# Patient Record
Sex: MALE | Race: BLACK OR AFRICAN AMERICAN | NOT HISPANIC OR LATINO | Employment: OTHER | URBAN - METROPOLITAN AREA
[De-identification: names, ages, dates, MRNs, and addresses within clinical notes are randomized per-mention and may not be internally consistent; named-entity substitution may affect disease eponyms.]

---

## 2017-05-09 ENCOUNTER — ALLSCRIPTS OFFICE VISIT (OUTPATIENT)
Dept: OTHER | Facility: OTHER | Age: 72
End: 2017-05-09

## 2017-05-12 ENCOUNTER — ALLSCRIPTS OFFICE VISIT (OUTPATIENT)
Dept: OTHER | Facility: OTHER | Age: 72
End: 2017-05-12

## 2017-11-01 DIAGNOSIS — I77.810 THORACIC AORTIC ECTASIA (HCC): ICD-10-CM

## 2017-11-02 ENCOUNTER — HOSPITAL ENCOUNTER (OUTPATIENT)
Dept: CT IMAGING | Facility: CLINIC | Age: 72
Discharge: HOME | End: 2017-11-02
Payer: MEDICARE

## 2017-11-02 DIAGNOSIS — I77.810 THORACIC AORTIC ECTASIA (HCC): ICD-10-CM

## 2017-11-02 PROCEDURE — 71250 CT THORAX DX C-: CPT

## 2017-11-13 ENCOUNTER — ALLSCRIPTS OFFICE VISIT (OUTPATIENT)
Dept: OTHER | Facility: OTHER | Age: 72
End: 2017-11-13

## 2017-11-14 NOTE — PROGRESS NOTES
Assessment  Assessed    1  Erectile dysfunction (607 84) (N52 9)   2  Anxiety (300 00) (F41 9)   3  Essential hypertension (401 9) (I10)   4  Dilated aortic root (447 71) (I04 810)    Plan  Erectile dysfunction    · Viagra 50 MG Oral Tablet; TAKE AS DIRECTED   Rx By: Evelyn Srivastava; Dispense: 0 Days ; #:6 Tablet; Refill: 4;Erectile dysfunction; GAVIN = N; Verified Transmission to [email or web address] [facility]; Last Updated By: [employer]; 11/13/2017 3:51:36 PM    Discussion/Summary  Cardiology Discussion Summary Free Text Note Form [facility]:   Patient with multiple medical problems who seems to be doing reasonably well from cardiac standpoint  Previous studies reviewed with patient  Medications reviewed and possible side effects discussed  concepts of cardiovascular disease , signs and symptoms of heart disease  Dietary and risk factor modification reinforced  All questions answered  Safety measures reviewed  Patient advised to report any problems prompting medical attention  Results of CT chest showing stable thoracic aortic dilatation at 4 2 cm discussed with patient  Importance of salt restriction as well as compliance with antihypertensive medications reviewed  Lengthy discussion with patient regarding erectile dysfunction  Patient would like to try some medications  Patient given prescription for Viagra 50 mg as directed  Risks and benefits as well as side effects discussed with patient  Patient report any side effects or issues taking medications as well as any improvement with his erectile dysfunction  Follow-up in 6 months  Follow-up with primary care physician  Patient had a few questions which were answered  Goals and Barriers: The patient has the current Goals: Compliance with medications  The patent has the current Barriers: None  Patient's Capacity to Self-Care: Patient is able to Self-Care  Patient Education: Educational resources provided: Please see discussion summary     Medication SE Review and Pt Understands Tx: Possible side effects of new medications were reviewed with the patient/guardian today  Counseling Documentation With Imm: The patient was counseled regarding risk factor reductions,-- impressions,-- risks and benefits of treatment options,-- importance of compliance with treatment  25 minutes was spent counseling  Chief Complaint  Chief Complaint Chronic Condition [facility]: Patient is here today for follow up of chronic conditions described in HPI  History of Present Illness  Cardiology [facility] Free Text Note Form [facility]: Patient presents for follow-up visit  Patient denies any history of chest pain shortness of breath  Patient denies any history of leg edema or orthopnea PND  No history of presyncope syncope  Patient states compliance with the present list of medications  Patient having issues with erectile dysfunction  He wants to try some medications for the same      Review of Systems  Cardiology Male ROS:    Cardiac: as noted in HPI  Skin: No complaints of nonhealing sores or skin rash  Genitourinary: erectile dysfunction, but-- as noted in HPI  Psychological: anxiety  General: No complaints of trouble sleeping, lack of energy, fatigue, appetite changes, weight changes, fever, frequent infections, or night sweats  Respiratory: No complaints of shortness of breath, cough with sputum, or wheezing  HEENT: No complaints of serious problems, hearing problems, nose problems, throat problems, or snoring  Gastrointestinal: No complaints of liver problems, nausea, vomiting, heartburn, constipation, bloody stools, diarrhea, problems swallowing, adbominal pain, or rectal bleeding  Hematologic: No complaints of bleeding disorders, anemia, blood clots, or excessive brusing  Neurological: No complaints of numbness, tingling, dizziness, weakness, seizures, headaches, syncope or fainting, AM fatigue, daytime sleepiness, no witnessed apnea episodes    Musculoskeletal: No complaints of arthritis, back pain, or painfull swelling  ROS Reviewed:   ROS reviewed  Active Problems  Problems    1  Anxiety (300 00) (F41 9)   2  Bradycardia (427 89) (R00 1)   3  Cerebral infarction, unspecified (434 91) (I63 9)   4  Diabetes mellitus with neuropathy (250 60,357 2) (E11 40)   5  Diabetic peripheral neuropathy (250 60,357 2) (E11 42)   6  Dilated aortic root (447 71) (I77 810)   7  Dizziness (780 4) (R42)   8  Dry mouth (527 7) (R68 2)   9  Dyspnea on exertion (786 09) (R06 09)   10  Essential hypertension (401 9) (I10)   11  Exercise-induced shortness of breath (786 05) (R06 02)    Past Medical History  Problems    1  History of diabetes mellitus (V12 29) (Z86 39)   2  History of hypertension (V12 59) (Z86 79)  Active Problems And Past Medical History Reviewed: The active problems and past medical history were reviewed and updated today  Surgical History  Problems    1  History of Appendectomy  Surgical History Reviewed: The surgical history was reviewed and updated today  Family History  Mother    1  Family history of Ovarian cancer  Sister    2  Family history of malignant neoplasm (V16 9) (Z80 9)  Family History Reviewed: The family history was reviewed and updated today  Social History  Problems    · Never a smoker   · Occasional alcohol use   · Retired   ·   Social History Reviewed: The social history was reviewed and updated today  Current Meds   1  Aspir-81 TBEC; TAKE 1 TABLET DAILY; Therapy: (Recorded:79Lli4171) to Recorded   2  Gabapentin 300 MG Oral Capsule; Take 1 capsule twice daily; Therapy: (Recorded:83Wif4415) to Recorded   3  GlipiZIDE ER 2 5 MG Oral Tablet Extended Release 24 Hour; TAKE 1 TABLET DAILY; Therapy: (Recorded:96Pxf4892) to Recorded   4  Januvia 100 MG Oral Tablet; TAKE 1 TABLET DAILY; Therapy: (Recorded:46Pye8270) to Recorded   5  Losartan Potassium 100 MG Oral Tablet; take 1 tablet by mouth once daily;  Therapy: 10MYM5970 to (Anival Galeana)  Requested for: 25RHL5399; Last Rx:21Ixa6851 Ordered   6  Metoprolol Succinate ER 50 MG Oral Tablet Extended Release 24 Hour; take 1 tablet twice a day; Therapy: 33HQL5927 to (Anival Galeana)  Requested for: 48WAE2290; Last Rx:51Hry9926 Ordered   7  Norvasc 10 MG Oral Tablet; TAKE 1 TABLET EVERY MORNING; Therapy: (Recorded:05Oct2016) to Recorded  Medication List Reviewed: The medication list was reviewed and updated today  Allergies  Medication    1  No Known Drug Allergies  Non-Medication    2  No Known Environmental Allergies   3  No Known Food Allergies    Vitals  Vital Signs    Recorded: 51XXL9442 03:38PM   Heart Rate 65   Systolic 702   Diastolic 82   Height 5 ft 11 in   Weight 220 lb 8 0 oz   BMI Calculated 30 75   BSA Calculated 2 2   O2 Saturation 100       Physical Exam   Constitutional  General appearance: No acute distress, well appearing and well nourished  Eyes  Conjunctiva and Sclera examination: Conjunctiva pink, sclera anicteric  Ears, Nose, Mouth, and Throat - Oropharynx: Clear, nares are clear, mucous membranes are moist   Neck  Neck and thyroid: Normal, supple, trachea midline, no thyromegaly  Pulmonary  Respiratory effort: No increased work of breathing or signs of respiratory distress  Auscultation of lungs: Clear to auscultation, no rales, no rhonchi, no wheezing, good air movement  Cardiovascular  Auscultation of heart: Normal rate and rhythm, normal S1 and S2, no murmurs  Carotid pulses: Normal, 2+ bilaterally  Peripheral vascular exam: Normal pulses throughout, no tenderness, erythema or swelling  Pedal pulses: Normal, 2+ bilaterally  Examination of extremities for edema and/or varicosities: Normal    Abdomen  Abdomen: Non-tender and no distention  Liver and spleen: No hepatomegaly or splenomegaly  Musculoskeletal Gait and station: Normal gait  -- Digits and nails: Normal without clubbing or cyanosis  -- Inspection/palpation of joints, bones, and muscles: Normal, ROM normal    Skin - Skin and subcutaneous tissue: Normal without rashes or lesions  Skin is warm and well perfused, normal turgor  Neurologic - Cranial nerves: II - XII intact  -- Speech: Normal    Psychiatric - Orientation to person, place, and time: Normal -- Mood and affect: Normal       Results/Data  Diagnostic Studies Reviewed Cardio: I personally reviewed the recording/images in the office today  My interpretation follows  CT Scan Review: CT of the chest shows stable thoracic aortic aneurysm measuring 4 2 cm        Future Appointments    Date/Time Provider Specialty Site   01/15/2018 04:20 PM Humaira Del Rio MD Neurology [facility] [address] [place] [address]       Signatures   Electronically signed by : WELLINGTON Walton ; Nov 13 2017  8:00PM EST                       (Author)

## 2018-01-13 VITALS
HEIGHT: 71 IN | SYSTOLIC BLOOD PRESSURE: 132 MMHG | DIASTOLIC BLOOD PRESSURE: 88 MMHG | WEIGHT: 224.06 LBS | BODY MASS INDEX: 31.37 KG/M2 | HEART RATE: 64 BPM

## 2018-01-14 VITALS
SYSTOLIC BLOOD PRESSURE: 126 MMHG | BODY MASS INDEX: 30.61 KG/M2 | HEART RATE: 58 BPM | OXYGEN SATURATION: 98 % | DIASTOLIC BLOOD PRESSURE: 86 MMHG | HEIGHT: 72 IN | WEIGHT: 226 LBS

## 2018-01-14 VITALS
SYSTOLIC BLOOD PRESSURE: 126 MMHG | HEIGHT: 71 IN | BODY MASS INDEX: 30.87 KG/M2 | DIASTOLIC BLOOD PRESSURE: 82 MMHG | WEIGHT: 220.5 LBS | HEART RATE: 65 BPM | OXYGEN SATURATION: 100 %

## 2018-01-15 NOTE — RESULT NOTES
Verified Results  * MRA HEAD WO CONTRAST [id number] 12:42PM Janeth Norman Order Number: [id number]     Test Name Result Flag Reference   MRA HEAD WO CONTRAST (Report)     MRA BRAIN     INDICATION: Headaches and dizziness  COMPARISON: Routine MRI examinations of the brain  No previous MRA exams  TECHNIQUE: Axial 3-D time-of-flight imaging with 3-D reconstructions  FINDINGS:     IMAGE QUALITY: Diagnostic  ANATOMY     INTERNAL CAROTID ARTERIES: Normal flow related enhancement of the distal cervical, petrous and cavernous segments of the internal carotid arteries  Normal ICA terminus  ANTERIOR CIRCULATION: Normal A1 segments  Normal anterior communicating artery  Normal flow-related enhancement of the anterior cerebral arteries  MIDDLE CEREBRAL ARTERY CIRCULATION: The M1 segment and middle cerebral artery branches demonstrate normal flow-related enhancement  DISTAL VERTEBRAL ARTERIES: Distal vertebral arteries are patient with a normal vertebrobasilar junction  The posterior inferior cerebellar artery origins are normal       BASILAR ARTERY: Normal      POSTERIOR CEREBRAL ARTERIES: Both posterior cerebral arteries arises from the basilar tip  Both arteries demonstrate normal flow-related enhancement  Normal posterior communicating arteries  IMPRESSION:     Normal MR angiogram of the brain         Workstation performed: [id number]     Signed by:   Michaela Solorzano DO   3/2/16

## 2018-01-24 ENCOUNTER — OFFICE VISIT (OUTPATIENT)
Dept: NEUROLOGY | Facility: CLINIC | Age: 73
End: 2018-01-24
Payer: MEDICARE

## 2018-01-24 VITALS
BODY MASS INDEX: 31.22 KG/M2 | SYSTOLIC BLOOD PRESSURE: 146 MMHG | DIASTOLIC BLOOD PRESSURE: 90 MMHG | WEIGHT: 223 LBS | HEIGHT: 71 IN | HEART RATE: 56 BPM

## 2018-01-24 DIAGNOSIS — E11.42 DIABETIC PERIPHERAL NEUROPATHY (HCC): ICD-10-CM

## 2018-01-24 DIAGNOSIS — I63.9 CEREBRAL INFARCTION, UNSPECIFIED MECHANISM (HCC): Primary | ICD-10-CM

## 2018-01-24 PROCEDURE — 99213 OFFICE O/P EST LOW 20 MIN: CPT | Performed by: PSYCHIATRY & NEUROLOGY

## 2018-01-24 RX ORDER — LOSARTAN POTASSIUM 50 MG/1
TABLET ORAL
COMMUNITY
End: 2019-01-14 | Stop reason: DRUGHIGH

## 2018-01-24 RX ORDER — ASPIRIN 81 MG/1
1 TABLET ORAL DAILY
COMMUNITY

## 2018-01-24 RX ORDER — GLIPIZIDE 2.5 MG/1
1 TABLET, EXTENDED RELEASE ORAL DAILY
COMMUNITY

## 2018-01-24 RX ORDER — AMLODIPINE BESYLATE 10 MG/1
TABLET ORAL EVERY MORNING
COMMUNITY

## 2018-01-24 RX ORDER — GABAPENTIN 300 MG/1
1 CAPSULE ORAL 2 TIMES DAILY
COMMUNITY
End: 2020-01-07

## 2018-01-24 RX ORDER — METOPROLOL SUCCINATE 50 MG/1
50 TABLET, EXTENDED RELEASE ORAL 2 TIMES DAILY
Refills: 0 | COMMUNITY
Start: 2017-11-06 | End: 2018-08-11 | Stop reason: SDUPTHER

## 2018-01-24 NOTE — PROGRESS NOTES
Progress Note - Neurology   Charles Meyers 81 y o  male MRN: [id number]  Unit/Bed#:  Encounter: [id number]      Subjective:   Patient without any new complaints, he denies any new stroke symptoms, his numbness and tingling in the feet is stable  No headaches, no dizziness, no other neurological complaints  ROS: 12 system cued query was unchanged from org  consult note  Vitals:   Vitals:    01/24/18 1238   BP: 146/90   Pulse: 56   ,Body mass index is 31 1 kg/m²  MEDS:      Physical Exam:  General appearance: alert, appears stated age and cooperative  Head: Normocephalic, without obvious abnormality, atraumatic    Neurologic:  Mental status: the patient is awake alert and oriented without aphasia or apraxia  CN: Visual fields are full to confrontation  Extraocular movements are full without nystagmus  Pupils are 3 mm and reactive  Face is symmetrical to light touch  Movements of facial expression move symmetrically  Hearing is normal to finger rub bilaterally  Soft palate lifts symmetrically  Shoulder shrug is symmetrical  Tongue is midline without atrophy  Motor: No drift is noted on arm extension  Strength is full in the upper and lower extremities with normal bulk and tone  Decreased light touch pinprick temperature sensation in a stocking distribution  Coordination: Finger to nose testing is performed accurately  Reflexes: 2/4 and symmetrical in the biceps, triceps, brachial radialis, knee jerk and ankle jerk regions  Toes are downgoing  Lab Results: I have personally reviewed pertinent reports  Imaging Studies: I have personally reviewed pertinent reports  Assessment:  1  Cerebral infarction, unspecified mechanism ([facility] [address] )     2   Di[facility]         Plan:  Patient was advised to keep his blood pressure cholesterol and sugar under control, continue with his aspirin and gabapentin, to take fall and safety precautions, stroke education given to the patient, to go to the hospital if has any worsening symptoms and call me otherwise to see me back in 6 months and follow up with family physician  Counseling / Coordination of Care  Total time spent today 20 minutes  Greater than 50% of total time was spent with the patient and / or family counseling and / or coordination of care  Missy Mills MD  1/24/2018,12:51 PM    Dictation voice to text software has been used in the creation of this document

## 2018-01-24 NOTE — PROGRESS NOTES
Patient ID: Charles Meyers is a 81 y o  male  Who presents today for a history of stroke and neuropathy    Assessment/Plan:    No problem-specific Assessment & Plan notes found for this encounter  Subjective:    HPI       The following portions of the patient's history were reviewed and updated as appropriate: allergies, current medications, past family history, past social history and past surgical history  Objective: There were no vitals taken for this visit  Physical Exam    Neurological Exam      ROS:    Review of Systems   Constitutional: Negative for appetite change and fever  HENT: Negative for nosebleeds  Eyes: Positive for pain  Negative for visual disturbance  Respiratory: Negative for chest tightness and shortness of breath  Cardiovascular: Negative for palpitations and leg swelling  Gastrointestinal: Negative for abdominal pain, constipation and diarrhea  Musculoskeletal: Positive for arthralgias and back pain  Negative for neck pain  Neurological: Positive for light-headedness and headaches  Negative for tremors, seizures, speech difficulty, weakness and numbness  Psychiatric/Behavioral: Positive for sleep disturbance

## 2018-05-21 DIAGNOSIS — I10 HYPERTENSION, ESSENTIAL: Primary | ICD-10-CM

## 2018-05-21 RX ORDER — LOSARTAN POTASSIUM 100 MG/1
TABLET ORAL
Qty: 90 TABLET | Refills: 3 | Status: SHIPPED | OUTPATIENT
Start: 2018-05-21 | End: 2018-06-11 | Stop reason: ALTCHOICE

## 2018-06-11 ENCOUNTER — OFFICE VISIT (OUTPATIENT)
Dept: CARDIOLOGY CLINIC | Facility: CLINIC | Age: 73
End: 2018-06-11
Payer: MEDICARE

## 2018-06-11 VITALS
HEIGHT: 71 IN | OXYGEN SATURATION: 98 % | BODY MASS INDEX: 30.1 KG/M2 | WEIGHT: 215 LBS | HEART RATE: 58 BPM | SYSTOLIC BLOOD PRESSURE: 132 MMHG | DIASTOLIC BLOOD PRESSURE: 74 MMHG

## 2018-06-11 DIAGNOSIS — I71.2 THORACIC AORTIC ANEURYSM WITHOUT RUPTURE (HCC): ICD-10-CM

## 2018-06-11 DIAGNOSIS — I10 HYPERTENSION, ESSENTIAL: Primary | ICD-10-CM

## 2018-06-11 PROBLEM — I71.20 THORACIC AORTIC ANEURYSM WITHOUT RUPTURE: Status: ACTIVE | Noted: 2018-06-11

## 2018-06-11 PROCEDURE — 99213 OFFICE O/P EST LOW 20 MIN: CPT | Performed by: INTERNAL MEDICINE

## 2018-08-02 ENCOUNTER — OFFICE VISIT (OUTPATIENT)
Dept: NEUROLOGY | Facility: CLINIC | Age: 73
End: 2018-08-02
Payer: MEDICARE

## 2018-08-02 VITALS
HEART RATE: 60 BPM | HEIGHT: 71 IN | DIASTOLIC BLOOD PRESSURE: 78 MMHG | WEIGHT: 217 LBS | SYSTOLIC BLOOD PRESSURE: 124 MMHG | BODY MASS INDEX: 30.38 KG/M2

## 2018-08-02 DIAGNOSIS — I63.9 CEREBRAL INFARCTION, UNSPECIFIED MECHANISM (HCC): Primary | ICD-10-CM

## 2018-08-02 DIAGNOSIS — E11.42 DIABETIC PERIPHERAL NEUROPATHY (HCC): ICD-10-CM

## 2018-08-02 PROCEDURE — 99214 OFFICE O/P EST MOD 30 MIN: CPT | Performed by: PSYCHIATRY & NEUROLOGY

## 2018-08-02 RX ORDER — UBIDECARENONE 75 MG
1 CAPSULE ORAL EVERY MORNING
Refills: 0 | COMMUNITY
Start: 2018-07-13 | End: 2020-05-27

## 2018-08-02 RX ORDER — CEPHRADINE 500 MG
1 CAPSULE ORAL EVERY MORNING
COMMUNITY
Start: 2018-07-10 | End: 2019-01-14 | Stop reason: HOSPADM

## 2018-08-02 NOTE — PROGRESS NOTES
Anabel Ram is a 81 y o  male  Chief Complaint   Patient presents with    Peripheral Neuropathy       Assessment:  1  Cerebral infarction, unspecified mechanism ([facility] [address] )    2  [facility]        Plan:    Discussion:  Patient is doing well with his history of CVA and diabetic neuropathy, he was advised to keep his blood pressure cholesterol and sugar is under control, stroke education given to the patient, he was advised to continue with his aspirin and Neurontin, have his blood work monitored by the family physician, he was also advised to follow up with his podiatrist regarding his foot care and with his cardiologist regarding his hypertension, to go to the hospital if has any worsening symptoms and call me otherwise to see me back in 6 months and follow up with his other physicians  Subjective:    HPI   Patient is here in follow-up for his history of CVA and diabetic neuropathy, since his last visit he is doing good, he has not had any stroke-like symptoms, his diabetic neuropathy is under control, no headaches no dizziness, no focal weakness, no other complaints      Vitals:    08/02/18 0816   BP: 124/78   BP Location: Left arm   Patient Position: Sitting   Cuff Size: Large   Pulse: 60   Weight: 98 4 kg (217 lb)   Height: 5' 11" (1 803 m)       Current Medications    Current Outpatient Prescriptions:     Alpha-Lipoic Acid 200 MG CAPS, Take 1 tablet by mouth every morning, Disp: , Rfl:     amLODIPine (NORVASC) 10 mg tablet, every morning  , Disp: , Rfl:     aspirin (ASPIR-LOW) 81 mg EC tablet, Take 1 tablet by mouth daily, Disp: , Rfl:     cyanocobalamin (VITAMIN B-12) 100 mcg tablet, Take 1 tablet by mouth every morning, Disp: , Rfl: 0    gabapentin (NEURONTIN) 300 mg capsule, Take 1 capsule by mouth 2 (two) times a day, Disp: , Rfl:     glipiZIDE (GLUCOTROL XL) 2 5 mg 24 hr tablet, Take 1 tablet by mouth daily, Disp: , Rfl:     losartan (COZAAR) 50 mg tablet, daily every morning, Disp: , Rfl:     metoprolol succinate (TOPROL-XL) 50 mg 24 hr tablet, Take 50 mg by mouth 2 (two) times a day, Disp: , Rfl: 0    sitaGLIPtin (JANUVIA) 100 mg tablet, Take 1 tablet by mouth daily, Disp: , Rfl:       Allergies  Patient has no known allergies  Past Medical History  Past Medical History:   Diagnosis Date    Anxiety     Bradycardia     Cerebral infarction ([facility] )     Diabetes mellitus ([facility] )     Diabetic neuropathy (HCC)     Dilated aortic root (HCC)     Essential hypertension     Exercise-induced shortness of breath          Past Surgical History:  Past Surgical History:   Procedure Laterality Date    APPENDECTOMY           Family History:  Family History   Problem Relation Age of Onset    Ovarian cancer Mother     Cancer Sister        Social History:   reports that he has never smoked  He has never used smokeless tobacco  He reports that he drinks alcohol  He reports that he does not use drugs  I have reviewed the past medical history, surgical history, social and family history, current medications, allergies vitals, review of systems, and updated this information as appropriate today  Objective:    Physical Exam    Neurological Exam    GENERAL:  Cooperative in no acute distress  Well-developed and well-nourished    HEAD and NECK   Head is atraumatic normocephalic with no lesions or masses  Neck is supple with full range of motion    CARDIOVASCULAR  Carotid Arteries-no carotid bruits  NEUROLOGIC:  Mental Status-the patient is awake alert and oriented without aphasia or apraxia  Cranial Nerves: Visual fields are full to confrontation  Extraocular movements are full without nystagmus  Pupils are 2-1/2 mm and reactive  Face is symmetrical to light touch  Movements of facial expression move symmetrically  Hearing is normal to finger rub bilaterally  Soft palate lifts symmetrically  Shoulder shrug is symmetrical  Tongue is midline without atrophy    Motor: No drift is noted on arm extension  Strength is full in the upper and lower extremities with normal bulk and tone  Sensory:  Decreased light touch pinprick temperature sensation in a glove and stocking distribution  Cortical function is intact  Coordination: Finger to nose testing is performed accurately  Gait reveals a normal base with symmetrical arm swing  Reflexes:  2+ and symmetrical          ROS:  Review of Systems   Constitutional: Negative  Negative for appetite change and fever  HENT: Negative  Negative for hearing loss, tinnitus, trouble swallowing and voice change  Eyes: Negative  Negative for photophobia and pain  Respiratory: Negative  Negative for shortness of breath  Cardiovascular: Negative  Negative for palpitations  Gastrointestinal: Negative  Negative for nausea and vomiting  Endocrine: Negative  Negative for cold intolerance and heat intolerance  Genitourinary: Negative  Negative for dysuria, frequency and urgency  Musculoskeletal: Positive for back pain  Negative for myalgias and neck pain  Skin: Negative  Negative for rash  Neurological: Negative for dizziness, tremors, seizures, syncope, facial asymmetry, speech difficulty, weakness, light-headedness, numbness and headaches  Hematological: Negative  Does not bruise/bleed easily  Psychiatric/Behavioral: Negative  Negative for confusion, hallucinations and sleep disturbance

## 2018-08-11 DIAGNOSIS — I10 HYPERTENSION, ESSENTIAL: Primary | ICD-10-CM

## 2018-08-11 RX ORDER — METOPROLOL SUCCINATE 50 MG/1
TABLET, EXTENDED RELEASE ORAL
Qty: 60 TABLET | Refills: 5 | Status: SHIPPED | OUTPATIENT
Start: 2018-08-11 | End: 2019-08-26 | Stop reason: SDUPTHER

## 2019-01-14 ENCOUNTER — OFFICE VISIT (OUTPATIENT)
Dept: NEUROLOGY | Facility: CLINIC | Age: 74
End: 2019-01-14
Payer: MEDICARE

## 2019-01-14 VITALS
WEIGHT: 216.6 LBS | DIASTOLIC BLOOD PRESSURE: 82 MMHG | HEIGHT: 71 IN | SYSTOLIC BLOOD PRESSURE: 132 MMHG | BODY MASS INDEX: 30.32 KG/M2 | HEART RATE: 68 BPM

## 2019-01-14 DIAGNOSIS — E11.42 DIABETIC PERIPHERAL NEUROPATHY (HCC): Primary | ICD-10-CM

## 2019-01-14 DIAGNOSIS — Z86.73 HISTORY OF CVA (CEREBROVASCULAR ACCIDENT): ICD-10-CM

## 2019-01-14 PROCEDURE — 99213 OFFICE O/P EST LOW 20 MIN: CPT | Performed by: PSYCHIATRY & NEUROLOGY

## 2019-01-14 RX ORDER — BLOOD SUGAR DIAGNOSTIC
STRIP MISCELLANEOUS
Refills: 0 | COMMUNITY
Start: 2018-11-02

## 2019-01-14 RX ORDER — SILDENAFIL 50 MG/1
50 TABLET, FILM COATED ORAL DAILY PRN
COMMUNITY
Start: 2018-11-13 | End: 2022-06-20

## 2019-01-14 RX ORDER — LANCETS 33 GAUGE
EACH MISCELLANEOUS
Refills: 0 | COMMUNITY
Start: 2018-11-02

## 2019-01-14 RX ORDER — LOSARTAN POTASSIUM 100 MG/1
1 TABLET ORAL DAILY
Refills: 0 | COMMUNITY
Start: 2018-11-12 | End: 2019-09-23 | Stop reason: SDUPTHER

## 2019-01-14 NOTE — PROGRESS NOTES
Christin Ramos is a 72 y o  male  Chief Complaint   Patient presents with    Follow-up     CVA and diabetic neuropathy        Assessment:  1  Diabetic peripheral neuropathy ([address] )    2  History of CVA (cerebrovascular accident)        Plan:    Discussion:  Patient is doing well with history of CVA and diabetic neuropathy, he is on aspirin and Neurontin, he was advised to continue with same, he was advised to keep his blood pressure cholesterol and sugar under control, stroke education given to the patient, the also was advised to follow-up with his podiatrist regarding his diabetic neuropathy foot care and with his cardiologist and family physician, to go to the hospital if has any stroke-like symptoms otherwise to see me back in 6 months and follow up with his other physicians  Subjective:    HPI   Patient is here in follow-up for his history of CVA and diabetic neuropathy, since his last visit he is doing good, he has not had any stroke-like symptoms, his diabetic neuropathy is under control, denies any numbness or tingling, no weakness, no side effects to Neurontin, no headaches no dizziness, no motor or sensory symptoms in upper or lower extremity, no other neurological complaints      Vitals:    01/14/19 1113   BP: 132/82   BP Location: Left arm   Patient Position: Sitting   Cuff Size: Adult   Pulse: 68   Weight: 98 2 kg (216 lb 9 6 oz)   Height: 5' 11" (1 803 m)       Current Medications    Current Outpatient Prescriptions:     amLODIPine (NORVASC) 10 mg tablet, every morning  , Disp: , Rfl:     aspirin (ASPIR-LOW) 81 mg EC tablet, Take 1 tablet by mouth daily, Disp: , Rfl:     cyanocobalamin (VITAMIN B-12) 100 mcg tablet, Take 1 tablet by mouth every morning, Disp: , Rfl: 0    gabapentin (NEURONTIN) 300 mg capsule, Take 1 capsule by mouth 2 (two) times a day, Disp: , Rfl:     glipiZIDE (GLUCOTROL XL) 2 5 mg 24 hr tablet, Take 1 tablet by mouth daily, Disp: , Rfl:     glucose blood (ONETOUCH VERIO) test strip, TEST twice a day, Disp: , Rfl:     losartan (COZAAR) 100 MG tablet, Take 1 tablet by mouth daily, Disp: , Rfl: 0    metoprolol succinate (TOPROL-XL) 50 mg 24 hr tablet, take 1 tablet by mouth twice a day, Disp: 60 tablet, Rfl: 5    ONETOUCH DELICA LANCETS 84E MISC, use ONE LANCET twice a day, Disp: , Rfl: 0    ONETOUCH VERIO test strip, TEST twice a day, Disp: , Rfl: 0    sildenafil (VIAGRA) 50 MG tablet, Take 50 mg by mouth daily as needed, Disp: , Rfl:     sitaGLIPtin (JANUVIA) 100 mg tablet, Take 1 tablet by mouth daily, Disp: , Rfl:       Allergies  Patient has no known allergies  Past Medical History  Past Medical History:   Diagnosis Date    Anxiety     Bradycardia     [facility]     Diabetes mellitus [facility]     Diabetic neuropathy (HCC)     Dilated aortic root (HCC)     Essential hypertension     Exercise-induced shortness of breath          Past Surgical History:  Past Surgical History:   Procedure Laterality Date    APPENDECTOMY           Family History:  Family History   Problem Relation Age of Onset    Ovarian cancer Mother     Cancer Sister        Social History:   reports that he has quit smoking  He has never used smokeless tobacco  He reports that he drinks alcohol  He reports that he does not use drugs  I have reviewed the past medical history, surgical history, social and family history, current medications, allergies vitals, review of systems, and updated this information as appropriate today  Objective:    Physical Exam    Neurological Exam      GENERAL:  Cooperative in no acute distress  Well-developed and well-nourished    HEAD and NECK   Head is atraumatic normocephalic with no lesions or masses  Neck is supple with full range of motion    CARDIOVASCULAR  Carotid Arteries-no carotid bruits      NEUROLOGIC:  Mental Status-the patient is awake alert and oriented without aphasia or apraxia  Cranial Nerves: Visual fields are full to confrontation  Extraocular movements are full without nystagmus  Pupils are 2-1/2 mm and reactive  Face is symmetrical to light touch  Movements of facial expression move symmetrically  Hearing is normal to finger rub bilaterally  Soft palate lifts symmetrically  Shoulder shrug is symmetrical  Tongue is midline without atrophy  Motor: No drift is noted on arm extension  Strength is full in the upper and lower extremities with normal bulk and tone  Sensory:   Decreased light touch pinprick temperature sensation in a glove and stocking distribution, cortical function is intact  Coordination: Finger to nose testing is performed accurately  Romberg is negative  Gait reveals a normal base with symmetrical arm swing  Reflexes:         1+ and symmetrical        ROS:  Review of Systems   Constitutional: Negative  HENT: Negative  Eyes: Positive for visual disturbance (feels stiff/dryness in both eyes intermittent)  Respiratory: Negative  Cardiovascular: Negative  Gastrointestinal: Negative  Endocrine: Negative  Genitourinary: Negative  Musculoskeletal: Positive for back pain (bulging disc low back pain)  Skin: Negative  Allergic/Immunologic: Negative  Neurological:        Off balance    Hematological: Negative  Psychiatric/Behavioral: Positive for sleep disturbance (falling and staying asleep )

## 2019-02-19 ENCOUNTER — OFFICE VISIT (OUTPATIENT)
Dept: CARDIOLOGY CLINIC | Facility: CLINIC | Age: 74
End: 2019-02-19
Payer: MEDICARE

## 2019-02-19 VITALS
HEART RATE: 65 BPM | WEIGHT: 221 LBS | BODY MASS INDEX: 30.94 KG/M2 | DIASTOLIC BLOOD PRESSURE: 72 MMHG | SYSTOLIC BLOOD PRESSURE: 118 MMHG | HEIGHT: 71 IN | OXYGEN SATURATION: 96 %

## 2019-02-19 DIAGNOSIS — I71.2 THORACIC AORTIC ANEURYSM WITHOUT RUPTURE (HCC): ICD-10-CM

## 2019-02-19 DIAGNOSIS — I10 HYPERTENSION, ESSENTIAL: Primary | ICD-10-CM

## 2019-02-19 PROCEDURE — 99213 OFFICE O/P EST LOW 20 MIN: CPT | Performed by: INTERNAL MEDICINE

## 2019-02-19 NOTE — PROGRESS NOTES
[facility]  [address] [place] [address]  Cardiology Follow Up    Sacha Beckwith  [birth year]  [id number]      1  Hypertension, essential     2  Thoracic aortic aneurysm without rupture [facility])         Chief Complaint   Patient presents with    Follow-up       Interval History:  Patient presents for follow-up visit  Patient denies any history of chest pain shortness of breath  Patient denies any history of leg edema or orthopnea PND  No history of presyncope syncope  Patient states compliance with the present list of medications  Patient Active Problem List   Diagnosis    Hypertension, essential    [facility]acic [facility] aneurysm without rupture [facility] [address] )    Cerebral infarction [facility] [facility] )    Diabetic peripheral neuropathy ([facility] )    History of CVA (cerebrovascular accident)     Past Medical History:   Diagnosis Date    Anxiety     Bradycardia     Cerebral infarction [facility] [facility] )     Diabetes mellitus ([facility] )     Diabetic neuropathy ([facility] )     Dilated aortic root ([facility] [facility] )     Essential hypertension     Exercise-induced shortness of breath      Social History     Socioeconomic History    Marital status:       Spouse name: Not on file    Number of children: Not on file    Years of education: Not on file    Highest education level: Not on file   Occupational History    Occupation: Retired   Social Needs    Financial resource strain: Not on file    Food insecurity:     Worry: Not on file     Inability: Not on file   SchoolOut needs:     Medical: Not on file     Non-medical: Not on file   Tobacco Use    Smoking status: Former Smoker    Smokeless tobacco: Never Used   Substance and Sexual Activity    Alcohol use: Yes     Comment: occasionally    Drug use: No    Sexual activity: Not on file   Lifestyle    Physical activity:     Days per week: Not on file     Minutes per session: Not on file    Stress: Not on file   Relationships    Social connections: Talks on phone: Not on file     Gets together: Not on file     Attends [employer] service: Not on file     Active member of club or organization: Not on file     Attends meetings of clubs or organizations: Not on file     Relationship status: Not on file    Intimate partner violence:     Fear of current or ex partner: Not on file     Emotionally abused: Not on file     Physically abused: Not on file     Forced sexual activity: Not on file   Other Topics Concern    Not on file   Social History Narrative    Not on file      Family History   Problem Relation Age of Onset    Ovarian cancer Mother     Cancer Sister      Past Surgical History:   Procedure Laterality Date    APPENDECTOMY         Current Outpatient Medications:     amLODIPine (NORVASC) 10 mg tablet, every morning  , Disp: , Rfl:     aspirin (ASPIR-LOW) 81 mg EC tablet, Take 1 tablet by mouth daily, Disp: , Rfl:     cyanocobalamin (VITAMIN B-12) 100 mcg tablet, Take 1 tablet by mouth every morning, Disp: , Rfl: 0    gabapentin (NEURONTIN) 300 mg capsule, Take 1 capsule by mouth 2 (two) times a day, Disp: , Rfl:     glipiZIDE (GLUCOTROL XL) 2 5 mg 24 hr tablet, Take 1 tablet by mouth daily, Disp: , Rfl:     glucose blood (ONETOUCH VERIO) test strip, TEST twice a day, Disp: , Rfl:     losartan (COZAAR) 100 MG tablet, Take 1 tablet by mouth daily, Disp: , Rfl: 0    metoprolol succinate (TOPROL-XL) 50 mg 24 hr tablet, take 1 tablet by mouth twice a day, Disp: 60 tablet, Rfl: 5    ONETOUCH DELICA LANCETS 07R MISC, use ONE LANCET twice a day, Disp: , Rfl: 0    ONETOUCH VERIO test strip, TEST twice a day, Disp: , Rfl: 0    sildenafil (VIAGRA) 50 MG tablet, Take 50 mg by mouth daily as needed, Disp: , Rfl:     sitaGLIPtin (JANUVIA) 100 mg tablet, Take 1 tablet by mouth daily, Disp: , Rfl:   No Known Allergies    Labs:  No visits with results within 2 Month(s) from this visit     Latest known visit with results is:   No results found for any previous visit      Imaging: No results found  Review of Systems:  Review of Systems   REVIEW OF SYSTEMS:  Constitutional:  Denies fever or chills   Eyes:  Denies change in visual acuity   HENT:  Denies nasal congestion or sore throat   Respiratory:  Denies cough or shortness of breath   Cardiovascular:  Denies chest pain or edema   GI:  Denies abdominal pain, nausea, vomiting, bloody stools or diarrhea   :  Denies dysuria, frequency, difficulty in micturition and nocturia  Musculoskeletal:  Denies back pain or joint pain   Neurologic:  Denies headache, focal weakness or sensory changes   Endocrine:  Denies polyuria or polydipsia   Lymphatic:  Denies swollen glands   Psychiatric:  Denies depression or anxiety     Physical Exam:    /72   Pulse 65   Ht 5' 11" (1 803 m)   Wt 100 kg (221 lb)   SpO2 96%   BMI 30 82 kg/m²     Physical Exam   PHYSICAL EXAM:  General:  Patient is not in acute distress   Head: Normocephalic, Atraumatic  HEENT:  Both pupils normal-size atraumatic, normocephalic, nonicteric  Neck:  JVP not raised  Trachea central  No carotid bruit  Respiratory:  normal breath sounds no crackles  no rhonchi  Cardiovascular:  Regular rate and rhythm no S3 no murmurs  GI:  Abdomen soft nontender  No organomegaly  Lymphatic:  No cervical or inguinal lymphadenopathy  Neurologic:  Patient is awake alert, oriented   Grossly nonfocal    Discussion/Summary:  Patient overall doing well from a cardiovascular standpoint  Continue present medications  Symptoms to watch out from cardiac standpoint which would indicate the need for further cardiac evaluation discussed  Patient will have a CT of the chest as well as echocardiogram prior to next visit to reassess history of thoracic aortic aneurysm  Follow-up with primary care physician  Followup in 6 months

## 2019-07-18 ENCOUNTER — HOSPITAL ENCOUNTER (OUTPATIENT)
Dept: NON INVASIVE DIAGNOSTICS | Facility: HOSPITAL | Age: 74
Discharge: HOME | End: 2019-07-18
Attending: INTERNAL MEDICINE
Payer: MEDICARE

## 2019-07-18 ENCOUNTER — HOSPITAL ENCOUNTER (OUTPATIENT)
Dept: CT IMAGING | Facility: HOSPITAL | Age: 74
Discharge: HOME | End: 2019-07-18
Attending: INTERNAL MEDICINE
Payer: MEDICARE

## 2019-07-18 ENCOUNTER — TELEPHONE (OUTPATIENT)
Dept: CARDIOLOGY CLINIC | Facility: CLINIC | Age: 74
End: 2019-07-18

## 2019-07-18 DIAGNOSIS — I10 HYPERTENSION, ESSENTIAL: ICD-10-CM

## 2019-07-18 DIAGNOSIS — I71.2 THORACIC AORTIC ANEURYSM WITHOUT RUPTURE (HCC): ICD-10-CM

## 2019-07-18 PROCEDURE — 93306 TTE W/DOPPLER COMPLETE: CPT

## 2019-07-18 PROCEDURE — 93306 TTE W/DOPPLER COMPLETE: CPT | Performed by: INTERNAL MEDICINE

## 2019-07-18 PROCEDURE — 71250 CT THORAX DX C-: CPT

## 2019-07-18 NOTE — TELEPHONE ENCOUNTER
----- Message from Desiree Ferrari MD sent at 7/18/2019  2:05 PM EDT -----  Please call  Echocardiogram shows normal ejection fraction  Moderate mitral regurgitation  Mild-to-moderate aortic regurgitation  Mild dilatation of the ascending aorta which was there before  Continue present medications  Will  Need repeat echocardiogram in 1 year

## 2019-07-18 NOTE — TELEPHONE ENCOUNTER
LMOM per Dr Andreina Storm Echocardiogram shows normal ejection fraction  Moderate mitral regurgitation and mild-to-moderate aortic regurgitation  Mild dilatation of the ascending aorta- which was in previous studies  He is to continue present medications  And repeat echo in 1 year

## 2019-07-23 ENCOUNTER — TELEPHONE (OUTPATIENT)
Dept: CARDIOLOGY CLINIC | Facility: CLINIC | Age: 74
End: 2019-07-23

## 2019-07-23 NOTE — TELEPHONE ENCOUNTER
----- Message from Aris Vyas MD sent at 7/22/2019  2:30 PM EDT -----  Stable 4 2 cm thoracic aortic aneurysm  No change from previous CT chest  in 2017

## 2019-07-24 ENCOUNTER — TELEPHONE (OUTPATIENT)
Dept: CARDIOLOGY CLINIC | Facility: CLINIC | Age: 74
End: 2019-07-24

## 2019-07-25 ENCOUNTER — TELEPHONE (OUTPATIENT)
Dept: CARDIOLOGY CLINIC | Facility: CLINIC | Age: 74
End: 2019-07-25

## 2019-07-25 NOTE — TELEPHONE ENCOUNTER
S/w pt and he verbally understood per Dr  [facility] thoracic aortic aneurysm is stable at 4 2cm which is unchanged from previous CT in 2017

## 2019-07-25 NOTE — TELEPHONE ENCOUNTER
----- Message from Paulo Sow MD sent at 7/22/2019  2:30 PM EDT -----  Stable 4 2 cm thoracic aortic aneurysm  No change from previous CT chest  in 2017

## 2019-08-07 ENCOUNTER — OFFICE VISIT (OUTPATIENT)
Dept: NEUROLOGY | Facility: CLINIC | Age: 74
End: 2019-08-07
Payer: MEDICARE

## 2019-08-07 VITALS
BODY MASS INDEX: 28.87 KG/M2 | HEART RATE: 60 BPM | WEIGHT: 206.2 LBS | DIASTOLIC BLOOD PRESSURE: 70 MMHG | SYSTOLIC BLOOD PRESSURE: 96 MMHG | HEIGHT: 71 IN

## 2019-08-07 DIAGNOSIS — Z86.73 HISTORY OF CVA (CEREBROVASCULAR ACCIDENT): ICD-10-CM

## 2019-08-07 DIAGNOSIS — E11.42 DIABETIC PERIPHERAL NEUROPATHY (HCC): Primary | ICD-10-CM

## 2019-08-07 PROCEDURE — 99213 OFFICE O/P EST LOW 20 MIN: CPT | Performed by: PSYCHIATRY & NEUROLOGY

## 2019-08-07 NOTE — PROGRESS NOTES
Giuseppe Wu is a 72 y o  male  Chief Complaint   Patient presents with    Follow-up     Neuropathy        Assessment:  1  Diabetic peripheral neuropathy ([address] )    2  History of CVA (cerebrovascular accident)          Discussion:  Patient is doing well with history of CVA and diabetic neuropathy, he is on aspirin and Neurontin, he was advised to continue with same, we discussed about decreasing Neurontin to once a day, he would like to hold off, also patient was advised to keep his blood pressure cholesterol and sugar under control, stroke education given to the patient, he was advised to take neuropathy foot care and follow-up with his podiatrist, also was advised to continue follow-up with his cardiologist and family physician, to go to the hospital if has stroke-like symptoms and call me otherwise to see me back in 6 months and follow up with his other physicians  Subjective:    HPI   Patient is here in follow-up for his history of CVA and diabetic neuropathy, since his last visit he is doing good, his symptoms are much better, he has not had any stroke-like symptoms, his diabetic neuropathy is under control, denies any numbness or tingling sensation in the legs, no weakness, no side effects to Neurontin, no headaches or dizziness, no motor or sensory symptoms in upper extremities, no other neurological complaints      Vitals:    08/07/19 1546   BP: 96/70   BP Location: Left arm   Patient Position: Sitting   Cuff Size: Adult   Pulse: 60   Weight: 93 5 kg (206 lb 3 2 oz)   Height: 5' 11" (1 803 m)       Current Medications    Current Outpatient Medications:     amLODIPine (NORVASC) 10 mg tablet, every morning  , Disp: , Rfl:     aspirin (ASPIR-LOW) 81 mg EC tablet, Take 1 tablet by mouth daily, Disp: , Rfl:     cyanocobalamin (VITAMIN B-12) 100 mcg tablet, Take 1 tablet by mouth every morning, Disp: , Rfl: 0    gabapentin (NEURONTIN) 300 mg capsule, Take 1 capsule by mouth 2 (two) times a day, Disp: , Rfl:     glipiZIDE (GLUCOTROL XL) 2 5 mg 24 hr tablet, Take 1 tablet by mouth daily, Disp: , Rfl:     glucose blood (ONETOUCH VERIO) test strip, TEST twice a day, Disp: , Rfl:     losartan (COZAAR) 100 MG tablet, Take 1 tablet by mouth daily, Disp: , Rfl: 0    metoprolol succinate (TOPROL-XL) 50 mg 24 hr tablet, take 1 tablet by mouth twice a day, Disp: 60 tablet, Rfl: 5    ONETOUCH DELICA LANCETS 67H MISC, use ONE LANCET twice a day, Disp: , Rfl: 0    ONETOUCH VERIO test strip, TEST twice a day, Disp: , Rfl: 0    sildenafil (VIAGRA) 50 MG tablet, Take 50 mg by mouth daily as needed, Disp: , Rfl:     sitaGLIPtin (JANUVIA) 100 mg tablet, Take 1 tablet by mouth daily, Disp: , Rfl:       Allergies  Patient has no known allergies  Past Medical History  Past Medical History:   Diagnosis Date    Anxiety     Bradycardia     [facility] )     Diabetes mellitus ([facility] )     Diabetic neuropathy (HCC)     Dilated aortic root (HCC)     Essential hypertension     Exercise-induced shortness of breath          Past Surgical History:  Past Surgical History:   Procedure Laterality Date    APPENDECTOMY           Family History:  Family History   Problem Relation Age of Onset    Ovarian cancer Mother     Cancer Sister        Social History:   reports that he has quit smoking  He has never used smokeless tobacco  He reports that he drinks alcohol  He reports that he does not use drugs  I have reviewed the past medical history, surgical history, social and family history, current medications, allergies vitals, review of systems, and updated this information as appropriate today  Objective:    I have reviewed the past medical history, surgical history, social and family history, current medications, allergies vitals, review of systems, and updated this information as appropriate today  Physical Exam    Neurological Exam    GENERAL:  Cooperative in no acute distress   Well-developed and well-nourished    HEAD and NECK   Head is atraumatic normocephalic with no lesions or masses  Neck is supple with full range of motion    CARDIOVASCULAR  Carotid Arteries-no carotid bruits  NEUROLOGIC:  Mental Status-the patient is awake alert and oriented without aphasia or apraxia  Cranial Nerves: Visual fields are full to confrontation  Extraocular movements are full without nystagmus  Pupils are 2-1/2 mm and reactive  Face is symmetrical to light touch  Movements of facial expression move symmetrically  Hearing is normal to finger rub bilaterally  Soft palate lifts symmetrically  Shoulder shrug is symmetrical  Tongue is midline without atrophy  Motor: No drift is noted on arm extension  Strength is full in the upper and lower extremities with normal bulk and tone  Sensory:  Decreased light touch pinprick temperature sensation in a stocking distribution  Cortical function is intact  Coordination: Finger to nose testing is performed accurately  Gait reveals a normal base with symmetrical arm swing  Reflexes:  1+ and symmetrical          ROS:  Review of Systems   Constitutional: Negative  Negative for appetite change, fatigue and fever  HENT: Negative  Negative for hearing loss, tinnitus, trouble swallowing and voice change  Eyes: Negative  Negative for photophobia, pain and visual disturbance  Respiratory: Negative  Negative for shortness of breath and wheezing  Cardiovascular: Negative  Negative for chest pain and palpitations  Gastrointestinal: Negative  Negative for nausea and vomiting  Endocrine: Negative  Negative for cold intolerance and heat intolerance  Genitourinary: Negative  Negative for dysuria, frequency and urgency  Musculoskeletal: Negative  Negative for arthralgias, back pain, gait problem, myalgias, neck pain and neck stiffness  Skin: Negative  Negative for rash  Allergic/Immunologic: Negative      Neurological: Positive for numbness (slight numbness in legs)  Negative for dizziness, tremors, seizures, syncope, facial asymmetry, speech difficulty, weakness, light-headedness and headaches  Hematological: Negative  Does not bruise/bleed easily  Psychiatric/Behavioral: Positive for decreased concentration and sleep disturbance (difficulty staying asleep )  Negative for confusion and hallucinations

## 2019-08-26 DIAGNOSIS — I10 HYPERTENSION, ESSENTIAL: ICD-10-CM

## 2019-08-26 RX ORDER — METOPROLOL SUCCINATE 50 MG/1
50 TABLET, EXTENDED RELEASE ORAL 2 TIMES DAILY
Qty: 60 TABLET | Refills: 5 | Status: SHIPPED | OUTPATIENT
Start: 2019-08-26 | End: 2020-04-30 | Stop reason: SDUPTHER

## 2019-08-26 NOTE — TELEPHONE ENCOUNTER
Pt needs a new script for metoprolol succinate 50mg 24hr tablet (30 day supply) sent to [facility] on [address] in [facility]   Thank you

## 2019-09-23 DIAGNOSIS — I10 HYPERTENSION, ESSENTIAL: Primary | ICD-10-CM

## 2019-09-23 RX ORDER — LOSARTAN POTASSIUM 100 MG/1
100 TABLET ORAL DAILY
Qty: 90 TABLET | Refills: 3 | Status: SHIPPED | OUTPATIENT
Start: 2019-09-23 | End: 2020-09-22

## 2020-01-07 DIAGNOSIS — E11.42 DIABETIC PERIPHERAL NEUROPATHY (HCC): Primary | ICD-10-CM

## 2020-01-07 RX ORDER — GABAPENTIN 300 MG/1
CAPSULE ORAL
Qty: 90 CAPSULE | Refills: 0 | Status: SHIPPED | OUTPATIENT
Start: 2020-01-07 | End: 2020-01-30

## 2020-01-30 DIAGNOSIS — E11.42 DIABETIC PERIPHERAL NEUROPATHY (HCC): ICD-10-CM

## 2020-01-30 RX ORDER — GABAPENTIN 300 MG/1
CAPSULE ORAL
Qty: 90 CAPSULE | Refills: 0 | Status: SHIPPED | OUTPATIENT
Start: 2020-01-30 | End: 2020-02-21 | Stop reason: SDUPTHER

## 2020-02-21 ENCOUNTER — OFFICE VISIT (OUTPATIENT)
Dept: NEUROLOGY | Facility: CLINIC | Age: 75
End: 2020-02-21
Payer: COMMERCIAL

## 2020-02-21 VITALS
HEART RATE: 56 BPM | HEIGHT: 71 IN | DIASTOLIC BLOOD PRESSURE: 76 MMHG | BODY MASS INDEX: 29.68 KG/M2 | SYSTOLIC BLOOD PRESSURE: 122 MMHG | WEIGHT: 212 LBS

## 2020-02-21 DIAGNOSIS — Z86.73 HISTORY OF CVA (CEREBROVASCULAR ACCIDENT): ICD-10-CM

## 2020-02-21 DIAGNOSIS — E11.42 DIABETIC PERIPHERAL NEUROPATHY (HCC): Primary | ICD-10-CM

## 2020-02-21 DIAGNOSIS — I10 HYPERTENSION, ESSENTIAL: ICD-10-CM

## 2020-02-21 PROCEDURE — 99214 OFFICE O/P EST MOD 30 MIN: CPT | Performed by: PSYCHIATRY & NEUROLOGY

## 2020-02-21 RX ORDER — GABAPENTIN 300 MG/1
300 CAPSULE ORAL 2 TIMES DAILY
Qty: 60 CAPSULE | Refills: 1 | Status: SHIPPED | OUTPATIENT
Start: 2020-02-21 | End: 2020-03-30 | Stop reason: SDUPTHER

## 2020-02-21 NOTE — PROGRESS NOTES
Karl Tripathi is a 69 y o  male  Chief Complaint   Patient presents with    Follow-up     Diabetic peripheral neuropathy       Assessment:  1  Diabetic peripheral neuropathy ([address] )    2  History of CVA (cerebrovascular accident)    3  Hypertension, essential          Discussion:  Patient is doing well with his history of CVA and diabetic neuropathy, he is on aspirin and Neurontin, he was advised to continue with same, he would like to continue with Neurontin 300 mg twice a day, I have advised him to continue monitoring his renal function with his family physician, to keep his blood pressure cholesterol and sugar under control, stroke education given to the patient, he was advised to take foot care for his neuropathy, to keep his blood pressure cholesterol and sugar under control, to go to the hospital if has any worsening symptoms, to take fall and safety precautions and see me back in 6 months and follow up with his other physicians  Subjective:    HPI   Patient is here in follow-up for his history of CVA and diabetic neuropathy, since his last visit he is doing good, his numbness and tingling sensation is under control, he has not had any stroke-like symptoms, his diabetic neuropathy is under control, no side effects to Neurontin, no focal weakness, no headaches, no dizziness, no other neurological complaints      Vitals:    02/21/20 0912   BP: 122/76   BP Location: Right arm   Patient Position: Sitting   Cuff Size: Adult   Pulse: 56   Weight: 96 2 kg (212 lb)   Height: 5' 11" (1 803 m)       Current Medications    Current Outpatient Medications:     amLODIPine (NORVASC) 10 mg tablet, every morning  , Disp: , Rfl:     aspirin (ASPIR-LOW) 81 mg EC tablet, Take 1 tablet by mouth daily, Disp: , Rfl:     cyanocobalamin (VITAMIN B-12) 100 mcg tablet, Take 1 tablet by mouth every morning, Disp: , Rfl: 0    gabapentin (NEURONTIN) 300 mg capsule, take 1 capsule by mouth three times a day (Patient taking differently: Take 300 mg by mouth 2 (two) times a day ), Disp: 90 capsule, Rfl: 0    glipiZIDE (GLUCOTROL XL) 2 5 mg 24 hr tablet, Take 1 tablet by mouth daily, Disp: , Rfl:     glucose blood (ONETOUCH VERIO) test strip, TEST twice a day, Disp: , Rfl:     losartan (COZAAR) 100 MG tablet, Take 1 tablet (100 mg total) by mouth daily, Disp: 90 tablet, Rfl: 3    metoprolol succinate (TOPROL-XL) 50 mg 24 hr tablet, Take 1 tablet (50 mg total) by mouth 2 (two) times a day, Disp: 60 tablet, Rfl: 5    ONETOUCH DELICA LANCETS 04Z MISC, use ONE LANCET twice a day, Disp: , Rfl: 0    ONETOUCH VERIO test strip, TEST twice a day, Disp: , Rfl: 0    sildenafil (VIAGRA) 50 MG tablet, Take 50 mg by mouth daily as needed, Disp: , Rfl:     sitaGLIPtin (JANUVIA) 100 mg tablet, Take 1 tablet by mouth daily, Disp: , Rfl:       Allergies  Patient has no known allergies  Past Medical History  Past Medical History:   Diagnosis Date    Anxiety     Bradycardia     [facility] [address]     Diabetes mellitus [facility] [address] [facility]     Diabetic neuropathy (HCC)     Dilated aortic root (HCC)     Essential hypertension     Exercise-induced shortness of breath          Past Surgical History:  Past Surgical History:   Procedure Laterality Date    APPENDECTOMY           Family History:  Family History   Problem Relation Age of Onset    Ovarian cancer Mother     Cancer Sister        Social History:   reports that he has quit smoking  He has never used smokeless tobacco  He reports that he drinks alcohol  He reports that he does not use drugs  I have reviewed the past medical history, surgical history, social and family history, current medications, allergies vitals, review of systems, and updated this information as appropriate today  Objective:    Physical Exam    Neurological Exam    GENERAL:  Cooperative in no acute distress   Well-developed and well-nourished    HEAD and NECK   Head is atraumatic normocephalic with no lesions or masses  Neck is supple with full range of motion    CARDIOVASCULAR  Carotid Arteries-no carotid bruits  NEUROLOGIC:  Mental Status-the patient is awake alert and oriented without aphasia or apraxia  Cranial Nerves: Visual fields are full to confrontation  Discs Extraocular movements are full without nystagmus  Pupils are 2-1/2 mm and reactive  Face is symmetrical to light touch  Movements of facial expression move symmetrically  Hearing is normal to finger rub bilaterally  Soft palate lifts symmetrically  Shoulder shrug is symmetrical  Tongue is midline without atrophy  Motor: No drift is noted on arm extension  Strength is full in the upper and lower extremities with normal bulk and tone  Sensory:  Decreased light touch pinprick temperature sensation in a stocking distribution Cortical function is intact  Coordination: Finger to nose testing is performed accurately  Romberg is negative  Gait reveals a normal base with symmetrical arm swing  Reflexes:  1+ and symmetrical     Toes are downgoing  No spine tenderness        ROS:  Review of Systems   Constitutional: Negative  Negative for appetite change, chills, fatigue and fever  HENT: Negative  Negative for hearing loss, tinnitus, trouble swallowing and voice change  Eyes: Negative for photophobia and pain  Respiratory: Negative  Negative for shortness of breath and wheezing  Cardiovascular: Negative  Negative for chest pain and palpitations  Gastrointestinal: Negative  Negative for nausea and vomiting  Endocrine: Negative  Negative for cold intolerance and heat intolerance  Genitourinary: Negative  Negative for dysuria, frequency and urgency  Musculoskeletal: Positive for back pain  Negative for arthralgias, gait problem, myalgias and neck pain  Skin: Negative  Negative for rash  Allergic/Immunologic: Negative  Neurological: Negative    Negative for dizziness, tremors, seizures, syncope, facial asymmetry, speech difficulty, weakness, light-headedness, numbness and headaches  Hematological: Negative  Does not bruise/bleed easily  Psychiatric/Behavioral: Negative  Negative for confusion, decreased concentration, hallucinations and sleep disturbance

## 2020-03-30 DIAGNOSIS — E11.42 DIABETIC PERIPHERAL NEUROPATHY (HCC): ICD-10-CM

## 2020-03-30 RX ORDER — GABAPENTIN 300 MG/1
300 CAPSULE ORAL 2 TIMES DAILY
Qty: 60 CAPSULE | Refills: 1 | Status: SHIPPED | OUTPATIENT
Start: 2020-03-30 | End: 2020-08-24

## 2020-04-27 DIAGNOSIS — I10 HYPERTENSION, ESSENTIAL: ICD-10-CM

## 2020-04-30 RX ORDER — METOPROLOL SUCCINATE 50 MG/1
50 TABLET, EXTENDED RELEASE ORAL 2 TIMES DAILY
Qty: 60 TABLET | Refills: 0 | Status: SHIPPED | OUTPATIENT
Start: 2020-04-30 | End: 2020-06-01 | Stop reason: SDUPTHER

## 2020-05-27 ENCOUNTER — TELEMEDICINE (OUTPATIENT)
Dept: CARDIOLOGY CLINIC | Facility: CLINIC | Age: 75
End: 2020-05-27
Payer: COMMERCIAL

## 2020-05-27 VITALS — HEIGHT: 71 IN | BODY MASS INDEX: 29.82 KG/M2 | WEIGHT: 213 LBS

## 2020-05-27 DIAGNOSIS — I71.2 THORACIC AORTIC ANEURYSM WITHOUT RUPTURE (HCC): ICD-10-CM

## 2020-05-27 DIAGNOSIS — I10 HYPERTENSION, ESSENTIAL: Primary | ICD-10-CM

## 2020-05-27 PROCEDURE — 99442 PR PHYS/QHP TELEPHONE EVALUATION 11-20 MIN: CPT | Performed by: INTERNAL MEDICINE

## 2020-06-01 DIAGNOSIS — I10 HYPERTENSION, ESSENTIAL: ICD-10-CM

## 2020-06-01 RX ORDER — METOPROLOL SUCCINATE 50 MG/1
50 TABLET, EXTENDED RELEASE ORAL 2 TIMES DAILY
Qty: 60 TABLET | Refills: 5 | Status: SHIPPED | OUTPATIENT
Start: 2020-06-01 | End: 2020-07-01 | Stop reason: SDUPTHER

## 2020-07-01 DIAGNOSIS — I10 HYPERTENSION, ESSENTIAL: ICD-10-CM

## 2020-07-01 RX ORDER — METOPROLOL SUCCINATE 50 MG/1
50 TABLET, EXTENDED RELEASE ORAL 2 TIMES DAILY
Qty: 180 TABLET | Refills: 2 | Status: SHIPPED | OUTPATIENT
Start: 2020-07-01 | End: 2021-09-08 | Stop reason: SDUPTHER

## 2020-07-07 ENCOUNTER — OFFICE VISIT (OUTPATIENT)
Dept: NEUROLOGY | Facility: CLINIC | Age: 75
End: 2020-07-07
Payer: COMMERCIAL

## 2020-07-07 VITALS
HEART RATE: 68 BPM | DIASTOLIC BLOOD PRESSURE: 76 MMHG | SYSTOLIC BLOOD PRESSURE: 112 MMHG | HEIGHT: 72 IN | RESPIRATION RATE: 14 BRPM | OXYGEN SATURATION: 94 % | WEIGHT: 215.4 LBS | TEMPERATURE: 98.1 F | BODY MASS INDEX: 29.17 KG/M2

## 2020-07-07 DIAGNOSIS — R42 DIZZINESS AND GIDDINESS: Primary | ICD-10-CM

## 2020-07-07 DIAGNOSIS — E11.42 DIABETIC PERIPHERAL NEUROPATHY (HCC): ICD-10-CM

## 2020-07-07 DIAGNOSIS — Z86.73 HISTORY OF CVA (CEREBROVASCULAR ACCIDENT): ICD-10-CM

## 2020-07-07 DIAGNOSIS — I10 HYPERTENSION, ESSENTIAL: ICD-10-CM

## 2020-07-07 PROCEDURE — 99214 OFFICE O/P EST MOD 30 MIN: CPT | Performed by: PSYCHIATRY & NEUROLOGY

## 2020-07-07 NOTE — PROGRESS NOTES
Marivel Selby is a 69 y o  male  Chief Complaint   Patient presents with    Dizziness     only when bending  Assessment:  1  Dizziness and giddiness    2  Diabetic peripheral neuropathy ([address] )    3  Hypertension, essential    4  History of CVA (cerebrovascular accident)          Discussion:  Differential diagnosis of dizziness discussed with the patient, will recommend an MRI scan of the brain to rule out a central etiology, patient to call me after the above test to discuss the results, also was advised to discuss with his cardiologist Dr Ingris Nichole and rule out orthostasis as he was slightly orthostatic in the office, I have advised him to keep himself well hydrated, if his MRI scan of the brain and cardiac workup is negative then it could be from vestibular etiology, he was advised to keep his blood pressure cholesterol and sugar under control, to take fall and safety precautions, his neuropathy seems to be controlled on gabapentin 300 mg twice a day, stroke education given to the patient, he was advised to continue with aspirin, to keep his blood pressure cholesterol and sugar under control, to go to the hospital if has any worsening symptoms and call me otherwise to see me back in 4 months and follow up with his other physicians  Subjective:    HPI   Patient is here in follow-up for his history of CVA and diabetic neuropathy, since his last visit he is doing good with his neuropathy, but he has been complaining of dizziness for the last 1 month which is mostly positional especially bending down or moving from side to side, denies any headache, no speech difficulty, no side effects to Neurontin, no tinnitus, he slightly orthostatic in the office, no focal weakness, no falls, no other neurological complaints      Vitals:    07/07/20 1417 07/07/20 1420 07/07/20 1422 07/07/20 1424   BP: 124/62 134/90 116/78 112/76   BP Location: Left arm Left arm Left arm Left arm   Patient Position: Sitting Supine Sitting Standing   Cuff Size: Standard Standard Standard Standard   Pulse: 63 56 60 68   Resp: 14      Temp: 98 1 °F (36 7 °C)      SpO2: 95% 93% 94% 94%   Weight: 97 7 kg (215 lb 6 4 oz)      Height: 5' 11 5" (1 816 m)          Current Medications    Current Outpatient Medications:     amLODIPine (NORVASC) 10 mg tablet, every morning  , Disp: , Rfl:     aspirin (ASPIR-LOW) 81 mg EC tablet, Take 1 tablet by mouth daily, Disp: , Rfl:     gabapentin (NEURONTIN) 300 mg capsule, Take 1 capsule (300 mg total) by mouth 2 (two) times a day, Disp: 60 capsule, Rfl: 1    glipiZIDE (GLUCOTROL XL) 2 5 mg 24 hr tablet, Take 1 tablet by mouth daily, Disp: , Rfl:     glucose blood (ONETOUCH VERIO) test strip, TEST twice a day, Disp: , Rfl:     losartan (COZAAR) 100 MG tablet, Take 1 tablet (100 mg total) by mouth daily, Disp: 90 tablet, Rfl: 3    metoprolol succinate (TOPROL-XL) 50 mg 24 hr tablet, Take 1 tablet (50 mg total) by mouth 2 (two) times a day, Disp: 180 tablet, Rfl: 2    ONETOUCH DELICA LANCETS 75S MISC, use ONE LANCET twice a day, Disp: , Rfl: 0    ONETOUCH VERIO test strip, TEST twice a day, Disp: , Rfl: 0    sildenafil (VIAGRA) 50 MG tablet, Take 50 mg by mouth daily as needed, Disp: , Rfl:     sitaGLIPtin (JANUVIA) 100 mg tablet, Take 1 tablet by mouth daily, Disp: , Rfl:       Allergies  Patient has no known allergies  Past Medical History  Past Medical History:   Diagnosis Date    Anxiety     Bradycardia     [facility]ere[facility] [address] [address] )     Diabetes mellitus ([facility] [address] )     Diabetic neuropathy (HCC)     Dilated aortic root (HCC)     Essential hypertension     Exercise-induced shortness of breath          Past Surgical History:  Past Surgical History:   Procedure Laterality Date    APPENDECTOMY           Family History:  Family History   Problem Relation Age of Onset    Ovarian cancer Mother     Cancer Sister        Social History:   reports that he has quit smoking   He has never used smokeless tobacco  He reports that he drinks alcohol  He reports that he does not use drugs  I have reviewed the past medical history, surgical history, social and family history, current medications, allergies vitals, review of systems, and updated this information as appropriate today  Objective:    Physical Exam    Neurological Exam    GENERAL:  Cooperative in no acute distress  Well-developed and well-nourished    HEAD and NECK   Head is atraumatic normocephalic with no lesions or masses  Neck is supple with full range of motion    CARDIOVASCULAR  Carotid Arteries-no carotid bruits  NEUROLOGIC:  Mental Status-the patient is awake alert and oriented without aphasia or apraxia  Cranial Nerves: Visual fields are full to confrontation    Extraocular movements are full without nystagmus  Pupils are 2-1/2 mm and reactive  Face is symmetrical to light touch  Movements of facial expression move symmetrically  Hearing is normal to finger rub bilaterally  Soft palate lifts symmetrically  Shoulder shrug is symmetrical  Tongue is midline without atrophy  Motor: No drift is noted on arm extension  Strength is full in the upper and lower extremities with normal bulk and tone  Sensory:  Decreased light touch pinprick temperature sensation in a stocking distribution Cortical function is intact  Coordination: Finger to nose testing is performed accurately  Romberg is negative  Gait reveals a normal base with symmetrical arm swing  Tandem walk is normal  Reflexes:  1+ and symmetrical          ROS:  Review of Systems   Constitutional: Negative  Negative for appetite change and fever  HENT: Negative  Negative for hearing loss, tinnitus, trouble swallowing and voice change  Eyes: Negative  Negative for photophobia and pain  Respiratory: Negative  Negative for shortness of breath  Cardiovascular: Negative  Negative for palpitations  Gastrointestinal: Negative  Negative for nausea and vomiting     Endocrine: Negative  Negative for cold intolerance  Genitourinary: Negative  Negative for dysuria, frequency and urgency  Musculoskeletal: Negative  Negative for myalgias and neck pain  Skin: Negative  Negative for rash  Allergic/Immunologic: Negative  Neurological: Positive for dizziness and light-headedness  Negative for tremors, seizures, syncope, facial asymmetry, speech difficulty, weakness, numbness and headaches  Hematological: Negative  Does not bruise/bleed easily  Psychiatric/Behavioral: Negative  Negative for confusion, hallucinations and sleep disturbance  All other systems reviewed and are negative

## 2020-07-21 ENCOUNTER — HOSPITAL ENCOUNTER (OUTPATIENT)
Dept: MRI IMAGING | Facility: CLINIC | Age: 75
Discharge: HOME | End: 2020-07-21
Payer: COMMERCIAL

## 2020-07-21 DIAGNOSIS — R42 DIZZINESS AND GIDDINESS: ICD-10-CM

## 2020-07-21 PROCEDURE — 70551 MRI BRAIN STEM W/O DYE: CPT

## 2020-07-22 ENCOUNTER — TELEPHONE (OUTPATIENT)
Dept: NEUROLOGY | Facility: CLINIC | Age: 75
End: 2020-07-22

## 2020-07-22 NOTE — TELEPHONE ENCOUNTER
Left message for the patient to call back, would recommend patient to be seen by neurosurgeon regarding the meningioma

## 2020-07-23 DIAGNOSIS — R93.0 ABNORMAL MRI OF HEAD: Primary | ICD-10-CM

## 2020-07-23 NOTE — PROGRESS NOTES
Discussed with patient MRI scan results, would recommend patient to be seen by neurosurgeon for a 2nd opinion regarding a possible meningioma

## 2020-07-29 ENCOUNTER — LAB REQUISITION (OUTPATIENT)
Dept: LAB | Facility: HOSPITAL | Age: 75
End: 2020-07-29
Payer: COMMERCIAL

## 2020-07-29 DIAGNOSIS — K63.5 POLYP OF COLON: ICD-10-CM

## 2020-07-29 DIAGNOSIS — Z86.010 PERSONAL HISTORY OF COLONIC POLYPS: ICD-10-CM

## 2020-07-29 PROCEDURE — 88305 TISSUE EXAM BY PATHOLOGIST: CPT | Performed by: PATHOLOGY

## 2020-07-31 ENCOUNTER — TELEPHONE (OUTPATIENT)
Dept: NEUROSURGERY | Facility: CLINIC | Age: 75
End: 2020-07-31

## 2020-08-03 ENCOUNTER — CONSULT (OUTPATIENT)
Dept: NEUROSURGERY | Facility: CLINIC | Age: 75
End: 2020-08-03
Payer: COMMERCIAL

## 2020-08-03 VITALS
HEART RATE: 64 BPM | WEIGHT: 215 LBS | SYSTOLIC BLOOD PRESSURE: 130 MMHG | HEIGHT: 72 IN | DIASTOLIC BLOOD PRESSURE: 80 MMHG | BODY MASS INDEX: 29.12 KG/M2 | TEMPERATURE: 98.2 F | RESPIRATION RATE: 16 BRPM

## 2020-08-03 DIAGNOSIS — R93.0 ABNORMAL MRI OF HEAD: ICD-10-CM

## 2020-08-03 DIAGNOSIS — R90.89 ABNORMAL FINDING ON MRI OF BRAIN: Primary | ICD-10-CM

## 2020-08-03 DIAGNOSIS — D33.0 BENIGN NEOPLASM OF SUPRATENTORIAL REGION OF BRAIN (HCC): ICD-10-CM

## 2020-08-03 PROCEDURE — 99203 OFFICE O/P NEW LOW 30 MIN: CPT | Performed by: PHYSICIAN ASSISTANT

## 2020-08-03 RX ORDER — PRAVASTATIN SODIUM 10 MG
10 TABLET ORAL DAILY
COMMUNITY
Start: 2020-06-03

## 2020-08-03 RX ORDER — METHOCARBAMOL 750 MG/1
50000 TABLET ORAL WEEKLY
COMMUNITY
Start: 2020-07-18

## 2020-08-03 RX ORDER — EMPAGLIFLOZIN 10 MG/1
10 TABLET, FILM COATED ORAL DAILY
COMMUNITY
Start: 2020-05-28

## 2020-08-03 NOTE — LETTER
August 3, 2020     Yazan Vick MD  [address] [facility] [address]    Patient: Fish Siddiqi   YOB: 1945   Date of Visit: 8/3/2020       Dear Dr Shannon Rising:    Thank you for referring Osmar Mckeon to me for evaluation  Below are my notes for this consultation  If you have questions, please do not hesitate to call me  I look forward to following your patient along with you           Sincerely,        Julita Yao PA-C        CC: Arline Epley, MD

## 2020-08-03 NOTE — PROGRESS NOTES
Patient ID: Jamin Cohen is a 69 y o  male  Diagnoses and all orders for this visit:    Abnormal finding on MRI of brain  -     MRI brain with and without contrast; Future    Abnormal MRI of head  -     Ambulatory referral to Neurosurgery  -     MRI brain with and without contrast; Future    Benign neoplasm of supratentorial region of brain        Assessment/Plan:    Very pleasant 24-year-old male, accompanied by his daughter, referred by Neurology, Dr Cuong Samuel to review MRI of the brain  Patient has a history of CVA and diabetic peripheral neuropathy, and follows with Neurology regularly, at his last visit he had complaint of dizziness over the last 1 month or so as part of his evaluation for this Dr Cuong Samuel ordered a MRI of the brain  Patient reports the episodes are infrequent, but are particularly prominent when he bends over at the waist or left so when he moves side to side  He denies room spinning  He has had MRI of the brain without contrast, 7/21/20, the study was carefully reviewed in detail and compared with prior study 9/14/15, approximate 12 x 8 mm right frontal lobe masses noted this has the appearance of meningioma, there is probably some increase in size when compared with the prior study 5 years ago  Studies were reviewed with the patient and his daughter  (Most easily identifiable on series 3, image 24)    He otherwise denies gait or balance disturbance, motor or sensory difficulties in the upper lower extremities, bowel or bladder incontinence, difficulty with memory or mentation, difficulty with executive function  On examination today he is awake, alert, oriented x3 his gait balance appeared to be unremarkable  There is no pronator drift, rapid alternating movements are intact, finger-nose is intact, cranial nerves are grossly intact, motor exam of the upper lower extremities is 5 x 5 for power, reflexes are intact and symmetric      At this juncture further follow-up is planned in about 3 months, with a MRI brain with without contrast to further delineate this lesion  Clinical follow-up in Neurosurgery is planned post imaging  Patient has stands within you to follow with Neurology per than usual protocols  Return to Neurosurgery sooner with any changes  These findings, impressions and recommendations are reviewed in great detail with the patient and  family, they expressed understanding and agreement, their questions were answered completely and to their satisfaction  Return in about 3 months (around 11/3/2020) for Review MRI brain with without contrast     Chief Complaint  Consultation to review MRI brain, offers no specific complaints otherwise  HPI       The following portions of the patient's history were reviewed and updated as appropriate: allergies, current medications, past family history, past medical history, past social history and past surgical history  Review of Systems   Constitutional: Negative  HENT: Negative  Eyes: Negative  Respiratory: Negative  Cardiovascular: Negative  Gastrointestinal: Negative  Endocrine: Negative  Genitourinary: Negative  Musculoskeletal: Negative  Skin: Negative  Allergic/Immunologic: Negative  Neurological: Positive for dizziness (at times)  Hematological: Bruises/bleeds easily (Aspirin)  Psychiatric/Behavioral: Positive for decreased concentration (forgetfulness)  All other systems reviewed and are negative  Objective:    Physical Exam   Constitutional: He is oriented to person, place, and time  He appears well-developed  HENT:   Head: Normocephalic and atraumatic  Eyes: Pupils are equal, round, and reactive to light  Neck: Neck supple  Cardiovascular: Normal rate  Pulmonary/Chest: Effort normal and breath sounds normal    Neurological: He is alert and oriented to person, place, and time  Skin: Skin is warm and dry     Nursing note and vitals reviewed  Neurologic Exam     Mental Status   Oriented to person, place, and time  Cranial Nerves     CN III, IV, VI   Pupils are equal, round, and reactive to light  MRI BRAIN WITHOUT CONTRAST  7/21/20     INDICATION: R42: Dizziness and giddiness      COMPARISON:   Brain MRI 9/14/2015      TECHNIQUE:  Sagittal T1, axial T2, axial FLAIR, axial T1, axial Novinger and axial diffusion imaging      IMAGE QUALITY:  Diagnostic      FINDINGS:     BRAIN PARENCHYMA AND EXTRA-AXIAL SPACES:  There is T2 intermediate signal extra-axial lesion overlying the right frontal lobe measuring 1 2 x 0 8 cm (series 6 image 19)  Only in retrospective it might possibly be appreciated as a punctuate focus on series 6 image 20 on prior exam 9/14/2015  There is no subjacent parenchymal edema      Nonspecific foci of T2/FLAIR hyperintensities involving periventricular and subcortical white matter most likely representing mild microangiopathic change  Chronic infarcts in bilateral basal ganglia and right cerebellum      There is no discrete mass, mass effect or midline shift  There is no intracranial hemorrhage  There is no evidence of acute infarction and diffusion imaging is unremarkable  There are no white matter changes in the cerebral hemispheres           VENTRICLES:  Normal for the patient's age      SELLA AND PITUITARY GLAND:  Normal      ORBITS:  Normal      PARANASAL SINUSES:  Unremarkable     VASCULATURE:  Evaluation of the major intracranial vasculature demonstrates appropriate flow voids      CALVARIUM AND SKULL BASE:  Normal      EXTRACRANIAL SOFT TISSUES:  Normal      IMPRESSION:     1  Sequela of chronic infarcts and mild microangiopathic changes      2   A 1 2 cm T2 intermediate signal extra-axial lesion overlying the right frontal lobe favoring a meningioma  No subjacent edema or significant mass effect

## 2020-08-03 NOTE — PATIENT INSTRUCTIONS
Proceed for MRI of the brain in approximately 3 months as discussed  Return to Neurosurgery after MRI Dr Joseph Lundberg [facility])    Continue to follow with Dr Carolyne Roman per his protocols      Return sooner with any new changes

## 2020-08-12 ENCOUNTER — HOSPITAL ENCOUNTER (OUTPATIENT)
Dept: NON INVASIVE DIAGNOSTICS | Facility: HOSPITAL | Age: 75
Discharge: HOME | End: 2020-08-12
Attending: INTERNAL MEDICINE
Payer: COMMERCIAL

## 2020-08-12 ENCOUNTER — HOSPITAL ENCOUNTER (OUTPATIENT)
Dept: CT IMAGING | Facility: HOSPITAL | Age: 75
Discharge: HOME | End: 2020-08-12
Attending: INTERNAL MEDICINE
Payer: COMMERCIAL

## 2020-08-12 DIAGNOSIS — I71.2 THORACIC AORTIC ANEURYSM WITHOUT RUPTURE (HCC): ICD-10-CM

## 2020-08-12 DIAGNOSIS — I10 HYPERTENSION, ESSENTIAL: ICD-10-CM

## 2020-08-12 PROCEDURE — 71250 CT THORAX DX C-: CPT

## 2020-08-12 PROCEDURE — 93306 TTE W/DOPPLER COMPLETE: CPT | Performed by: INTERNAL MEDICINE

## 2020-08-12 PROCEDURE — 93306 TTE W/DOPPLER COMPLETE: CPT

## 2020-08-13 ENCOUNTER — TELEPHONE (OUTPATIENT)
Dept: CARDIOLOGY CLINIC | Facility: CLINIC | Age: 75
End: 2020-08-13

## 2020-08-13 NOTE — TELEPHONE ENCOUNTER
----- Message from Unknown Quivers, MD sent at 8/13/2020  3:37 PM EDT -----  Stable thoracic aortic aneurysm measuring 4 2 cm

## 2020-08-23 DIAGNOSIS — E11.42 DIABETIC PERIPHERAL NEUROPATHY (HCC): ICD-10-CM

## 2020-08-24 RX ORDER — GABAPENTIN 300 MG/1
CAPSULE ORAL
Qty: 60 CAPSULE | Refills: 1 | Status: SHIPPED | OUTPATIENT
Start: 2020-08-24 | End: 2020-11-16

## 2020-09-22 DIAGNOSIS — I10 HYPERTENSION, ESSENTIAL: ICD-10-CM

## 2020-09-22 RX ORDER — LOSARTAN POTASSIUM 100 MG/1
TABLET ORAL
Qty: 90 TABLET | Refills: 3 | Status: SHIPPED | OUTPATIENT
Start: 2020-09-22 | End: 2021-09-15

## 2020-11-06 ENCOUNTER — HOSPITAL ENCOUNTER (OUTPATIENT)
Dept: MRI IMAGING | Facility: CLINIC | Age: 75
Discharge: HOME | End: 2020-11-06
Payer: COMMERCIAL

## 2020-11-06 DIAGNOSIS — R90.89 ABNORMAL FINDING ON MRI OF BRAIN: ICD-10-CM

## 2020-11-06 DIAGNOSIS — R93.0 ABNORMAL MRI OF HEAD: ICD-10-CM

## 2020-11-06 PROCEDURE — A9585 GADOBUTROL INJECTION: HCPCS | Performed by: PHYSICIAN ASSISTANT

## 2020-11-06 PROCEDURE — 70553 MRI BRAIN STEM W/O & W/DYE: CPT

## 2020-11-06 PROCEDURE — G1004 CDSM NDSC: HCPCS

## 2020-11-06 RX ADMIN — GADOBUTROL 10 ML: 604.72 INJECTION INTRAVENOUS at 13:14

## 2020-11-09 ENCOUNTER — OFFICE VISIT (OUTPATIENT)
Dept: NEUROLOGY | Facility: CLINIC | Age: 75
End: 2020-11-09
Payer: COMMERCIAL

## 2020-11-09 VITALS
BODY MASS INDEX: 30.34 KG/M2 | HEART RATE: 55 BPM | TEMPERATURE: 99.3 F | HEIGHT: 72 IN | SYSTOLIC BLOOD PRESSURE: 130 MMHG | WEIGHT: 224 LBS | DIASTOLIC BLOOD PRESSURE: 78 MMHG

## 2020-11-09 DIAGNOSIS — E11.42 DIABETIC PERIPHERAL NEUROPATHY (HCC): ICD-10-CM

## 2020-11-09 DIAGNOSIS — I10 HYPERTENSION, ESSENTIAL: ICD-10-CM

## 2020-11-09 DIAGNOSIS — R42 DIZZINESS AND GIDDINESS: ICD-10-CM

## 2020-11-09 DIAGNOSIS — D33.0 BENIGN NEOPLASM OF SUPRATENTORIAL REGION OF BRAIN (HCC): ICD-10-CM

## 2020-11-09 DIAGNOSIS — R90.89 ABNORMAL FINDING ON MRI OF BRAIN: ICD-10-CM

## 2020-11-09 DIAGNOSIS — Z86.73 HISTORY OF CVA (CEREBROVASCULAR ACCIDENT): Primary | ICD-10-CM

## 2020-11-09 PROCEDURE — 99214 OFFICE O/P EST MOD 30 MIN: CPT | Performed by: PSYCHIATRY & NEUROLOGY

## 2020-11-15 DIAGNOSIS — E11.42 DIABETIC PERIPHERAL NEUROPATHY (HCC): ICD-10-CM

## 2020-11-16 ENCOUNTER — TELEMEDICINE (OUTPATIENT)
Dept: NEUROSURGERY | Facility: CLINIC | Age: 75
End: 2020-11-16
Payer: COMMERCIAL

## 2020-11-16 ENCOUNTER — TELEPHONE (OUTPATIENT)
Dept: NEUROSURGERY | Facility: CLINIC | Age: 75
End: 2020-11-16

## 2020-11-16 DIAGNOSIS — D33.0 BENIGN NEOPLASM OF SUPRATENTORIAL REGION OF BRAIN (HCC): Primary | ICD-10-CM

## 2020-11-16 PROCEDURE — 99214 OFFICE O/P EST MOD 30 MIN: CPT | Performed by: NURSE PRACTITIONER

## 2020-11-16 RX ORDER — GABAPENTIN 300 MG/1
CAPSULE ORAL
Qty: 60 CAPSULE | Refills: 1 | Status: SHIPPED | OUTPATIENT
Start: 2020-11-16 | End: 2021-01-20 | Stop reason: SDUPTHER

## 2021-01-20 DIAGNOSIS — E11.42 DIABETIC PERIPHERAL NEUROPATHY (HCC): ICD-10-CM

## 2021-01-20 RX ORDER — GABAPENTIN 300 MG/1
300 CAPSULE ORAL 2 TIMES DAILY
Qty: 60 CAPSULE | Refills: 1 | Status: SHIPPED | OUTPATIENT
Start: 2021-01-20 | End: 2021-04-22 | Stop reason: SDUPTHER

## 2021-02-24 ENCOUNTER — OFFICE VISIT (OUTPATIENT)
Dept: CARDIOLOGY CLINIC | Facility: CLINIC | Age: 76
End: 2021-02-24
Payer: COMMERCIAL

## 2021-02-24 VITALS
DIASTOLIC BLOOD PRESSURE: 74 MMHG | SYSTOLIC BLOOD PRESSURE: 126 MMHG | BODY MASS INDEX: 29.66 KG/M2 | OXYGEN SATURATION: 95 % | WEIGHT: 219 LBS | HEART RATE: 61 BPM | HEIGHT: 72 IN

## 2021-02-24 DIAGNOSIS — I10 HYPERTENSION, ESSENTIAL: ICD-10-CM

## 2021-02-24 DIAGNOSIS — R06.02 SHORTNESS OF BREATH: Primary | ICD-10-CM

## 2021-02-24 DIAGNOSIS — I71.2 THORACIC AORTIC ANEURYSM WITHOUT RUPTURE (HCC): ICD-10-CM

## 2021-02-24 PROCEDURE — 99214 OFFICE O/P EST MOD 30 MIN: CPT | Performed by: INTERNAL MEDICINE

## 2021-02-25 NOTE — PROGRESS NOTES
[facility] [place]  [address] [address]  Cardiology Follow Up    David Lubin  1945  [id number]      1  Shortness of breath  NM myocardial perfusion spect (rx stress and/or rest)   2  Hypertension, essential     3  Thoracic aortic aneurysm without rupture [facility])         Chief Complaint   Patient presents with    Follow-up       Interval History:   Patient presents for follow-up visit  Patient denies any chest pain  Patient does have some shortness of breath with exertion  No history of orthopnea PND  Patient does have some leg edema which is mild  No history of palpitations or dizziness  No history of presyncope syncope  He states that he has been compliant all his present medications  Patient also has history of anxiety  Patient Active Problem List   Diagnosis    Hypertension, essential    Thoracic aortic aneurysm without rupture ([facility] )    Cerebral infarction ([facility] )    Diabetic peripheral neuropathy ([facility] )    History of CVA (cerebrovascular accident)    Dizziness and giddiness    Abnormal finding on MRI of brain    Benign neoplasm of supratentorial region of brain [facility])     Past Medical History:   Diagnosis Date    Anxiety     Bradycardia     Cerebral infarction ([facility] )     Diabetes mellitus ([facility] )     Diabetic neuropathy ([facility] )     Dilated aortic root ([facility] )     Essential hypertension     Exercise-induced shortness of breath      Social History     Socioeconomic History    Marital status:       Spouse name: Not on file    Number of children: Not on file    Years of education: Not on file    Highest education level: Not on file   Occupational History    Occupation: Retired   Social Needs    Financial resource strain: Not on file    Food insecurity     Worry: Not on file     Inability: Not on file   [employer] needs     Medical: Not on file     Non-medical: Not on file   Tobacco Use    Smoking status: Former Smoker  Smokeless tobacco: Never Used   Substance and Sexual Activity    Alcohol use: Yes     Comment: occasionally    Drug use: No    Sexual activity: Not on file   Lifestyle    Physical activity     Days per week: Not on file     Minutes per session: Not on file    Stress: Not on file   Relationships    Social connections     Talks on phone: Not on file     Gets together: Not on file     Attends [employer] service: Not on file     Active member of club or organization: Not on file     Attends meetings of clubs or organizations: Not on file     Relationship status: Not on file    Intimate partner violence     Fear of current or ex partner: Not on file     Emotionally abused: Not on file     Physically abused: Not on file     Forced sexual activity: Not on file   Other Topics Concern    Not on file   Social History Narrative    Not on file      Family History   Problem Relation Age of Onset    Ovarian cancer Mother     Cancer Sister      Past Surgical History:   Procedure Laterality Date    APPENDECTOMY         Current Outpatient Medications:     amLODIPine (NORVASC) 10 mg tablet, every morning  , Disp: , Rfl:     aspirin (ASPIR-LOW) 81 mg EC tablet, Take 1 tablet by mouth daily, Disp: , Rfl:     D3-50 1 25 MG (48393 UT) capsule, Take 50,000 Units by mouth once a week, Disp: , Rfl:     gabapentin (NEURONTIN) 300 mg capsule, Take 1 capsule (300 mg total) by mouth 2 (two) times a day, Disp: 60 capsule, Rfl: 1    glipiZIDE (GLUCOTROL XL) 2 5 mg 24 hr tablet, Take 1 tablet by mouth daily, Disp: , Rfl:     Jardiance 10 MG TABS, Take 10 mg by mouth daily, Disp: , Rfl:     losartan (COZAAR) 100 MG tablet, take 1 tablet by mouth once daily, Disp: 90 tablet, Rfl: 3    metoprolol succinate (TOPROL-XL) 50 mg 24 hr tablet, Take 1 tablet (50 mg total) by mouth 2 (two) times a day, Disp: 180 tablet, Rfl: 2    ONETOUCH DELICA LANCETS 42O MISC, use ONE LANCET twice a day, Disp: , Rfl: 0    ONETOUCH VERIO test strip, TEST twice a day, Disp: , Rfl: 0    pravastatin (PRAVACHOL) 10 mg tablet, Take 10 mg by mouth daily, Disp: , Rfl:     sildenafil (VIAGRA) 50 MG tablet, Take 50 mg by mouth daily as needed, Disp: , Rfl:     sitaGLIPtin (JANUVIA) 100 mg tablet, Take 1 tablet by mouth daily, Disp: , Rfl:   No Known Allergies    Labs:  No visits with results within 2 Month(s) from this visit  Latest known visit with results is:   Lab Requisition on 07/29/2020   Component Date Value    Case Report 07/29/2020                      Value:Surgical Pathology Report                         Case: [id number]                                   Authorizing Provider:  Lucien Lindsey MD       Collected:           07/29/2020 0845              Ordering Location:     [address] [place] [address]      Received:            07/29/2020 [address] [facility],[address] Specialty                                                                                  Laboratory                                                                   Pathologist:           Abisai Tobias MD                                                        Specimen:    Large Intestine, Left/Descending Colon, proximal                                           Final Diagnosis 07/29/2020                      Value: This result contains rich text formatting which cannot be displayed here   Additional Information 07/29/2020                      Value: This result contains rich text formatting which cannot be displayed here   Synoptic Checklist 07/29/2020                      Value:  (COLON/RECTUM POLYP FORM-GI - All Specimens)                                                                                                                 : Adenoma(s)     Buck Colder Description 07/29/2020                      Value: This result contains rich text formatting which cannot be displayed here  Imaging: No results found      Review of Systems:  Review of Systems REVIEW OF SYSTEMS:  Constitutional:  Denies fever or chills   Eyes:  Denies change in visual acuity   HENT:  Denies nasal congestion or sore throat   Respiratory:   shortness of breath   Cardiovascular:  Denies chest pain or edema   GI:  Denies abdominal pain, nausea, vomiting, bloody stools or diarrhea   :  Denies dysuria, frequency, difficulty in micturition and nocturia  Musculoskeletal:  Denies back pain or joint pain   Neurologic:  Denies headache, focal weakness or sensory changes   Endocrine:  Denies polyuria or polydipsia   Lymphatic:  Denies swollen glands   Psychiatric:  Denies depression or anxiety     Physical Exam:    /74   Pulse 61   Ht 5' 11 5" (1 816 m)   Wt 99 3 kg (219 lb)   SpO2 95%   BMI 30 12 kg/m²     Physical Exam   PHYSICAL EXAM:  General:  Patient is not in acute distress   Head: Normocephalic, Atraumatic  HEENT:  Both pupils normal-size atraumatic, normocephalic, nonicteric  Neck:  JVP not raised  Trachea central  No carotid bruit  Respiratory:  normal breath sounds no crackles  no rhonchi  Cardiovascular:  Regular rate and rhythm no S3 no murmurs  GI:  Abdomen soft nontender  No organomegaly  Lymphatic:  No cervical or inguinal lymphadenopathy  Neurologic:  Patient is awake alert, oriented   Grossly nonfocal    Extremities no edema    Discussion/Summary:   patient has symptoms of shortness of breath with exertion of unclear etiology  Patient does have significant risk factors for coronary artery disease  Patient will be scheduled for pharmacological nuclear stress test to assess for ischemia  Patient is unable to exercise on the treadmill because of significant shortness of breath with minimal exertion and EGD  Patient also has history of thoracic aortic aneurysm which has been followed with serial echocardiograms    Patient did have an echocardiogram last summer which showed normal ejection fraction with dilated thoracic aorta and no significant valvular abnormalities  Patient had a few questions which were answered  Follow-up in 6 months or earlier as needed  Patient is agreeable with the plan of care

## 2021-03-08 ENCOUNTER — HOSPITAL ENCOUNTER (OUTPATIENT)
Dept: NON INVASIVE DIAGNOSTICS | Facility: CLINIC | Age: 76
Discharge: HOME | End: 2021-03-08
Payer: COMMERCIAL

## 2021-03-08 DIAGNOSIS — R06.02 SHORTNESS OF BREATH: ICD-10-CM

## 2021-03-08 PROCEDURE — 78452 HT MUSCLE IMAGE SPECT MULT: CPT | Performed by: INTERNAL MEDICINE

## 2021-03-08 PROCEDURE — 78452 HT MUSCLE IMAGE SPECT MULT: CPT

## 2021-03-08 PROCEDURE — 93017 CV STRESS TEST TRACING ONLY: CPT

## 2021-03-08 PROCEDURE — G1004 CDSM NDSC: HCPCS

## 2021-03-08 PROCEDURE — A9502 TC99M TETROFOSMIN: HCPCS

## 2021-03-08 PROCEDURE — 93016 CV STRESS TEST SUPVJ ONLY: CPT | Performed by: INTERNAL MEDICINE

## 2021-03-08 PROCEDURE — 93018 CV STRESS TEST I&R ONLY: CPT | Performed by: INTERNAL MEDICINE

## 2021-03-08 RX ADMIN — REGADENOSON 0.4 MG: 0.08 INJECTION, SOLUTION INTRAVENOUS at 08:51

## 2021-03-09 LAB
MAX DIASTOLIC BP: 86 MMHG
MAX HEART RATE: 62 BPM
MAX PREDICTED HEART RATE: 147 BPM
MAX. SYSTOLIC BP: 156 MMHG
PROTOCOL NAME: NORMAL
REASON FOR TERMINATION: NORMAL
TARGET HR FORMULA: NORMAL
TEST INDICATION: NORMAL
TIME IN EXERCISE PHASE: NORMAL

## 2021-03-10 ENCOUNTER — TELEPHONE (OUTPATIENT)
Dept: CARDIOLOGY CLINIC | Facility: CLINIC | Age: 76
End: 2021-03-10

## 2021-03-10 NOTE — TELEPHONE ENCOUNTER
----- Message from Shalini Hurtado PA-C sent at 3/10/2021  4:55 PM EST -----  Spoke with patient  He wants to discuss options with his daughter who lives in [place]  He would like to have this report sent to his home of record so he can make his final decision  He was urged to proceed to the ED if he develops any concerning symptoms and report back his decision as soon as possible so we can get him scheduled  Could we have someone send this report to him please? Thank you

## 2021-03-11 NOTE — TELEPHONE ENCOUNTER
OK  Stress test report also available on patient portal  Will wait for patient's decision regarding cardiac cath

## 2021-03-15 ENCOUNTER — TELEPHONE (OUTPATIENT)
Dept: CARDIOLOGY CLINIC | Facility: CLINIC | Age: 76
End: 2021-03-15

## 2021-03-15 DIAGNOSIS — I42.9 CARDIOMYOPATHY, UNSPECIFIED TYPE (HCC): Primary | ICD-10-CM

## 2021-03-15 NOTE — TELEPHONE ENCOUNTER
Pt called stating he received a letter from Re[facility] Miller stating he should see DR MAGDALENA DALTON because of his abnormal stress test  Please advise if pt can see a AP

## 2021-03-15 NOTE — TELEPHONE ENCOUNTER
I left a message for this patient  As mentioned, he needs a cardiac catheterization because of new onset cardiomyopathy  I would prefer to discuss this procedure in the office     I would recommend that we have an  Echocardiogram scheduled for this patient and have a follow-up appointment in early April to discuss further regarding the procedure  Order for echocardiogram in the system  Please schedule

## 2021-03-15 NOTE — TELEPHONE ENCOUNTER
This patient needs cardiac catheterization  [address]  had talked to this patient  Patient was in the process of talking to his daughter  Yes, he can be seen by AP  To discuss cardiac catheterization further

## 2021-03-15 NOTE — TELEPHONE ENCOUNTER
Spoke with pt  Pt wants to talk with dr Murray Most only about this cardiac cath  Pt has concerns  please call pt

## 2021-03-16 NOTE — TELEPHONE ENCOUNTER
I talked to the patient finally  Please schedule him for an echocardiogram which is ordered to be done in the next week  Please schedule him for an office appointment early April  Thank you

## 2021-03-16 NOTE — TELEPHONE ENCOUNTER
Spoke with pt  Pt has his echo schedule for Tuesday 03/23/21 at 11:30 am  Please schedule pt with dr Cedrick Thakkar that week or after

## 2021-03-23 ENCOUNTER — HOSPITAL ENCOUNTER (OUTPATIENT)
Dept: NON INVASIVE DIAGNOSTICS | Facility: CLINIC | Age: 76
Discharge: HOME | End: 2021-03-23
Payer: COMMERCIAL

## 2021-03-23 DIAGNOSIS — I42.9 CARDIOMYOPATHY, UNSPECIFIED TYPE (HCC): ICD-10-CM

## 2021-03-23 PROCEDURE — 93306 TTE W/DOPPLER COMPLETE: CPT | Performed by: INTERNAL MEDICINE

## 2021-03-23 PROCEDURE — 93306 TTE W/DOPPLER COMPLETE: CPT

## 2021-04-09 ENCOUNTER — TELEPHONE (OUTPATIENT)
Dept: CARDIOLOGY CLINIC | Facility: CLINIC | Age: 76
End: 2021-04-09

## 2021-04-09 ENCOUNTER — OFFICE VISIT (OUTPATIENT)
Dept: CARDIOLOGY CLINIC | Facility: CLINIC | Age: 76
End: 2021-04-09
Payer: COMMERCIAL

## 2021-04-09 VITALS
SYSTOLIC BLOOD PRESSURE: 128 MMHG | HEART RATE: 64 BPM | HEIGHT: 72 IN | BODY MASS INDEX: 29.39 KG/M2 | OXYGEN SATURATION: 96 % | WEIGHT: 217 LBS | DIASTOLIC BLOOD PRESSURE: 72 MMHG

## 2021-04-09 DIAGNOSIS — I71.2 THORACIC AORTIC ANEURYSM WITHOUT RUPTURE (HCC): ICD-10-CM

## 2021-04-09 DIAGNOSIS — R94.39 ABNORMAL STRESS TEST: ICD-10-CM

## 2021-04-09 DIAGNOSIS — I42.9 CARDIOMYOPATHY, UNSPECIFIED TYPE (HCC): ICD-10-CM

## 2021-04-09 DIAGNOSIS — I10 HYPERTENSION, ESSENTIAL: ICD-10-CM

## 2021-04-09 DIAGNOSIS — R06.02 SHORTNESS OF BREATH: Primary | ICD-10-CM

## 2021-04-09 PROCEDURE — 99214 OFFICE O/P EST MOD 30 MIN: CPT | Performed by: INTERNAL MEDICINE

## 2021-04-09 NOTE — PROGRESS NOTES
[facility] [address] [address]  Cardiology Follow Up    Estela Mitchell  1945  [id number]      1  Shortness of breath     2  Cardiomyopathy, unspecified type ([facility] )     3  Hypertension, essential     4  Thoracic aortic aneurysm without rupture [facility])         Chief Complaint   Patient presents with    Follow-up       Interval History:   Patient presents for follow-up visit  Patient does have shortness of breath with exertion  Patient has multiple risk factors for coronary artery disease  Patient had a stress test which showed a fixed defect with possible reduced ejection fraction  Echocardiogram however did not show reduced ejection fraction   patient does have history of thoracic aortic aneurysm the size of this has been stable  He states that he has been compliant with all his present medications  He does have history of anxiety  Is accompanied by daughter for the visit today  Patient Active Problem List   Diagnosis    Hypertension, essential    Thoracic aortic aneurysm without rupture ([facility] )    Cerebral infarction ([place] [address] )    Diabetic peripheral neuropathy ([place] [address] )    History of CVA (cerebrovascular accident)    Dizziness and giddiness    Abnormal finding on MRI of brain    Benign neoplasm of supratentorial region of brain [facility])     Past Medical History:   Diagnosis Date    Anxiety     Bradycardia     Cerebral infarction ([facility] )     Diabetes mellitus ([place] [address] )     Diabetic neuropathy ([place] [address] )     Dilated aortic root ([place] [address] )     Essential hypertension     Exercise-induced shortness of breath      Social History     Socioeconomic History    Marital status:       Spouse name: Not on file    Number of children: Not on file    Years of education: Not on file    Highest education level: Not on file   Occupational History    Occupation: Retired   Social Needs    Financial resource strain: Not on file    Food insecurity     Worry: Not on file     Inability: Not on file    Transportation needs     Medical: Not on file     Non-medical: Not on file   Tobacco Use    Smoking status: Former Smoker    Smokeless tobacco: Never Used   Substance and Sexual Activity    Alcohol use: Yes     Comment: occasionally    Drug use: No    Sexual activity: Not on file   Lifestyle    Physical activity     Days per week: Not on file     Minutes per session: Not on file    Stress: Not on file   Relationships    Social connections     Talks on phone: Not on file     Gets together: Not on file     Attends [employer] service: Not on file     Active member of club or organization: Not on file     Attends meetings of clubs or organizations: Not on file     Relationship status: Not on file    Intimate partner violence     Fear of current or ex partner: Not on file     Emotionally abused: Not on file     Physically abused: Not on file     Forced sexual activity: Not on file   Other Topics Concern    Not on file   Social History Narrative    Not on file      Family History   Problem Relation Age of Onset    Ovarian cancer Mother     Cancer Sister      Past Surgical History:   Procedure Laterality Date    APPENDECTOMY         Current Outpatient Medications:     amLODIPine (NORVASC) 10 mg tablet, every morning  , Disp: , Rfl:     aspirin (ASPIR-LOW) 81 mg EC tablet, Take 1 tablet by mouth daily, Disp: , Rfl:     D3-50 1 25 MG (66000 UT) capsule, Take 50,000 Units by mouth once a week, Disp: , Rfl:     gabapentin (NEURONTIN) 300 mg capsule, Take 1 capsule (300 mg total) by mouth 2 (two) times a day, Disp: 60 capsule, Rfl: 1    glipiZIDE (GLUCOTROL XL) 2 5 mg 24 hr tablet, Take 1 tablet by mouth daily, Disp: , Rfl:     Jardiance 10 MG TABS, Take 10 mg by mouth daily, Disp: , Rfl:     losartan (COZAAR) 100 MG tablet, take 1 tablet by mouth once daily, Disp: 90 tablet, Rfl: 3    metoprolol succinate (TOPROL-XL) 50 mg 24 hr tablet, Take 1 tablet (50 mg total) by mouth 2 (two) times a day, Disp: 180 tablet, Rfl: 2    ONETOUCH DELICA LANCETS 54Y MISC, use ONE LANCET twice a day, Disp: , Rfl: 0    ONETOUCH VERIO test strip, TEST twice a day, Disp: , Rfl: 0    pravastatin (PRAVACHOL) 10 mg tablet, Take 10 mg by mouth daily, Disp: , Rfl:     sildenafil (VIAGRA) 50 MG tablet, Take 50 mg by mouth daily as needed, Disp: , Rfl:     sitaGLIPtin (JANUVIA) 100 mg tablet, Take 1 tablet by mouth daily, Disp: , Rfl:   No Known Allergies    Labs:  Hospital Outpatient Visit on 03/08/2021   Component Date Value    Protocol Name 03/08/2021 LEXISCAN-SIT     Time In Exercise Phase 03/08/2021 00:03:00     MAX  SYSTOLIC BP 21/00/3255 174     Max Diastolic Bp 92/63/2481 86     Max Heart Rate 03/08/2021 62     Max Predicted Heart Rate 03/08/2021 147     Reason for Termination 03/08/2021 Protocol Complete     Test Indication 03/08/2021 SOB     Target Hr Formular 03/08/2021 (220 - Age)*85%      Imaging: No results found  Review of Systems:  Review of Systems     REVIEW OF SYSTEMS:  Constitutional:  Denies fever or chills   Eyes:  Denies change in visual acuity   HENT:  Denies nasal congestion or sore throat   Respiratory: shortness of breath   Cardiovascular:  Denies chest pain or edema   GI:  Denies abdominal pain, nausea, vomiting, bloody stools or diarrhea   :  Denies dysuria, frequency, difficulty in micturition and nocturia  Musculoskeletal:  Denies back pain or joint pain   Neurologic:  Denies headache, focal weakness or sensory changes   Endocrine:  Denies polyuria or polydipsia   Lymphatic:  Denies swollen glands   Psychiatric:  Denies depression or anxiety     Physical Exam:    /72   Pulse 64   Ht 5' 11 5" (1 816 m)   Wt 98 4 kg (217 lb)   SpO2 96%   BMI 29 84 kg/m²     Physical Exam   PHYSICAL EXAM:  General:  Patient is not in acute distress   Head: Normocephalic, Atraumatic    HEENT:  Both pupils normal-size atraumatic, normocephalic, nonicteric  Neck:  JVP not raised  Trachea central  No carotid bruit  Respiratory:  normal breath sounds no crackles  no rhonchi  Cardiovascular:  Regular rate and rhythm no S3 no murmurs  GI:  Abdomen soft nontender  No organomegaly  Lymphatic:  No cervical or inguinal lymphadenopathy  Neurologic:  Patient is awake alert, oriented   Grossly nonfocal    Extremities no edema      Discussion/Summary:     this patient has symptoms of progressive increase in shortness of breath which could be angina equivalent in patients with diabetes  Patient has multiple risk factors for coronary artery disease which include diabetes, hypertension, hyperlipidemia  Lengthy discussion with patient and family regarding options of cardiac catheterization  Risks and benefits of cardiac catheterization and possible revascularization options including but not limited to risk of infection, bleeding, risk of myocardial infarction, stroke, and risk of death discussed at length with patient  Patient understands the risks and benefits and wishes to proceed  Cardiac catheterization will be scheduled   Preprocedure workup will be done  Further cardiac evaluation and management after the results of cardiac catheterization  Patient denied any history of dye allergy  Patient will hold his diabetes medications the day of the procedure  Addendum:   Patient and daughter had a lot of questions regarding the procedure  Patient took his time and called back and wanted to go ahead and proceed with scheduling the cardiac catheterization

## 2021-04-12 ENCOUNTER — TELEPHONE (OUTPATIENT)
Dept: CARDIOLOGY CLINIC | Facility: CLINIC | Age: 76
End: 2021-04-12

## 2021-04-12 NOTE — TELEPHONE ENCOUNTER
----- Message from Luis Alfredo Salomon MD sent at 4/10/2021  1:17 PM EDT -----  Regarding: Schedule cardiac catheterization at [facility]   Please schedule this patient for cardiac catheterization at [facility] in the next 1 to 2 weeks  Order and blood work in the chart  Patient denied any dye allergy  Patient should hold his diabetes medications the day of the procedure  Thank you

## 2021-04-12 NOTE — TELEPHONE ENCOUNTER
S/w pt, prescreening completed  Pt states that he goes to [facility] in [facility] for [employer], called and received the fax # to the lab- [phone]  Pt will go today or tomorrow  [facility] orders faxed,  confirmation received  Pt is aware of med holds  Once labs are received, the bed res will be sent to the [facility], this pt is to have a [facility] on 4/19/21  Can we check for auth please?  Thank you

## 2021-04-22 DIAGNOSIS — E11.42 DIABETIC PERIPHERAL NEUROPATHY (HCC): ICD-10-CM

## 2021-04-22 RX ORDER — GABAPENTIN 300 MG/1
300 CAPSULE ORAL 2 TIMES DAILY
Qty: 60 CAPSULE | Refills: 0 | Status: SHIPPED | OUTPATIENT
Start: 2021-04-22 | End: 2021-05-12 | Stop reason: SDUPTHER

## 2021-04-26 ENCOUNTER — TELEPHONE (OUTPATIENT)
Dept: INTERVENTIONAL RADIOLOGY/VASCULAR | Facility: HOSPITAL | Age: 76
End: 2021-04-26

## 2021-04-26 NOTE — TELEPHONE ENCOUNTER
[facility] [id number] at [place] [address] was approved    Auth# [id number]  4/23/21-10/20/21

## 2021-04-30 ENCOUNTER — TELEPHONE (OUTPATIENT)
Dept: INTERVENTIONAL RADIOLOGY/VASCULAR | Facility: HOSPITAL | Age: 76
End: 2021-04-30

## 2021-05-05 ENCOUNTER — TELEPHONE (OUTPATIENT)
Dept: NON INVASIVE DIAGNOSTICS | Facility: HOSPITAL | Age: 76
End: 2021-05-05

## 2021-05-07 RX ORDER — SODIUM CHLORIDE 9 MG/ML
50 INJECTION, SOLUTION INTRAVENOUS CONTINUOUS
Status: CANCELLED | OUTPATIENT
Start: 2021-05-10

## 2021-05-10 ENCOUNTER — HOSPITAL ENCOUNTER (OUTPATIENT)
Dept: INTERVENTIONAL RADIOLOGY/VASCULAR | Facility: HOSPITAL | Age: 76
Discharge: HOME | End: 2021-05-10
Attending: INTERNAL MEDICINE | Admitting: INTERNAL MEDICINE
Payer: COMMERCIAL

## 2021-05-10 VITALS
TEMPERATURE: 97.1 F | DIASTOLIC BLOOD PRESSURE: 89 MMHG | WEIGHT: 219.36 LBS | HEART RATE: 49 BPM | BODY MASS INDEX: 29.71 KG/M2 | HEIGHT: 72 IN | OXYGEN SATURATION: 98 % | SYSTOLIC BLOOD PRESSURE: 148 MMHG | RESPIRATION RATE: 20 BRPM

## 2021-05-10 DIAGNOSIS — R06.02 SHORTNESS OF BREATH: ICD-10-CM

## 2021-05-10 DIAGNOSIS — R94.39 ABNORMAL STRESS TEST: ICD-10-CM

## 2021-05-10 LAB
ATRIAL RATE: 53 BPM
GLUCOSE SERPL-MCNC: 127 MG/DL (ref 65–140)
KCT BLD-ACNC: 237 SEC (ref 89–137)
P AXIS: 57 DEGREES
PR INTERVAL: 214 MS
QRS AXIS: 3 DEGREES
QRSD INTERVAL: 82 MS
QT INTERVAL: 440 MS
QTC INTERVAL: 412 MS
SPECIMEN SOURCE: ABNORMAL
T WAVE AXIS: 25 DEGREES
VENTRICULAR RATE: 53 BPM

## 2021-05-10 PROCEDURE — 93572 IV DOP VEL&/PRESS C FLO EA: CPT | Performed by: INTERNAL MEDICINE

## 2021-05-10 PROCEDURE — 99153 MOD SED SAME PHYS/QHP EA: CPT | Performed by: INTERNAL MEDICINE

## 2021-05-10 PROCEDURE — 93458 L HRT ARTERY/VENTRICLE ANGIO: CPT | Performed by: INTERNAL MEDICINE

## 2021-05-10 PROCEDURE — 93571 IV DOP VEL&/PRESS C FLO 1ST: CPT | Performed by: INTERNAL MEDICINE

## 2021-05-10 PROCEDURE — C1894 INTRO/SHEATH, NON-LASER: HCPCS | Performed by: INTERNAL MEDICINE

## 2021-05-10 PROCEDURE — C1769 GUIDE WIRE: HCPCS | Performed by: INTERNAL MEDICINE

## 2021-05-10 PROCEDURE — C1887 CATHETER, GUIDING: HCPCS | Performed by: INTERNAL MEDICINE

## 2021-05-10 PROCEDURE — 85347 COAGULATION TIME ACTIVATED: CPT

## 2021-05-10 PROCEDURE — 93005 ELECTROCARDIOGRAM TRACING: CPT

## 2021-05-10 PROCEDURE — 99152 MOD SED SAME PHYS/QHP 5/>YRS: CPT | Performed by: INTERNAL MEDICINE

## 2021-05-10 PROCEDURE — 93010 ELECTROCARDIOGRAM REPORT: CPT | Performed by: INTERNAL MEDICINE

## 2021-05-10 PROCEDURE — 82948 REAGENT STRIP/BLOOD GLUCOSE: CPT

## 2021-05-10 RX ORDER — HEPARIN SODIUM 1000 [USP'U]/ML
INJECTION, SOLUTION INTRAVENOUS; SUBCUTANEOUS CODE/TRAUMA/SEDATION MEDICATION
Status: COMPLETED | OUTPATIENT
Start: 2021-05-10 | End: 2021-05-10

## 2021-05-10 RX ORDER — FENTANYL CITRATE 50 UG/ML
INJECTION, SOLUTION INTRAMUSCULAR; INTRAVENOUS CODE/TRAUMA/SEDATION MEDICATION
Status: COMPLETED | OUTPATIENT
Start: 2021-05-10 | End: 2021-05-10

## 2021-05-10 RX ORDER — NITROGLYCERIN 20 MG/100ML
INJECTION INTRAVENOUS CODE/TRAUMA/SEDATION MEDICATION
Status: COMPLETED | OUTPATIENT
Start: 2021-05-10 | End: 2021-05-10

## 2021-05-10 RX ORDER — MIDAZOLAM HYDROCHLORIDE 2 MG/2ML
INJECTION, SOLUTION INTRAMUSCULAR; INTRAVENOUS CODE/TRAUMA/SEDATION MEDICATION
Status: COMPLETED | OUTPATIENT
Start: 2021-05-10 | End: 2021-05-10

## 2021-05-10 RX ORDER — LIDOCAINE WITH 8.4% SOD BICARB 0.9%(10ML)
SYRINGE (ML) INJECTION CODE/TRAUMA/SEDATION MEDICATION
Status: COMPLETED | OUTPATIENT
Start: 2021-05-10 | End: 2021-05-10

## 2021-05-10 RX ORDER — SODIUM CHLORIDE 9 MG/ML
50 INJECTION, SOLUTION INTRAVENOUS CONTINUOUS
Status: DISCONTINUED | OUTPATIENT
Start: 2021-05-10 | End: 2021-05-14 | Stop reason: HOSPADM

## 2021-05-10 RX ADMIN — FENTANYL CITRATE 25 MCG: 50 INJECTION, SOLUTION INTRAMUSCULAR; INTRAVENOUS at 08:43

## 2021-05-10 RX ADMIN — Medication 1 ML: at 08:42

## 2021-05-10 RX ADMIN — HEPARIN SODIUM 5000 UNITS: 1000 INJECTION INTRAVENOUS; SUBCUTANEOUS at 08:51

## 2021-05-10 RX ADMIN — HEPARIN SODIUM 4000 UNITS: 1000 INJECTION INTRAVENOUS; SUBCUTANEOUS at 09:00

## 2021-05-10 RX ADMIN — SODIUM CHLORIDE 50 ML/HR: 0.9 INJECTION, SOLUTION INTRAVENOUS at 07:32

## 2021-05-10 RX ADMIN — IODIXANOL 55 ML: 320 INJECTION, SOLUTION INTRAVASCULAR at 09:23

## 2021-05-10 RX ADMIN — MIDAZOLAM HYDROCHLORIDE 1 MG: 1 INJECTION, SOLUTION INTRAMUSCULAR; INTRAVENOUS at 08:44

## 2021-05-10 RX ADMIN — NITROGLYCERIN 200 MCG: 20 INJECTION INTRAVENOUS at 08:50

## 2021-05-10 NOTE — BRIEF OP NOTE (RAD/CATH)
The patient is a 77-year-old gentleman with a history of diabetes hypertension and hyperlipidemia  He presents with exertional dyspnea and a stress test concerning for significant disease  Procedure performed:  1  Left heart catheterization  2  Selective coronary angiography  3  iFR of the distal LAD and mid right coronary artery  Left heart catheterization:  Using a JR4 diagnostic catheter the aortic valve was crossed  There is no aortic valvular gradient with a baseline blood pressure of 130 over 70 mm of mercury  Left ventricular end-diastolic pressure was 12 mm of mercury  Selective coronary angiography:  1  From the right coronary artery extremely large caliber dominant vessel supplying the PDA and posterior lateral branch  There is a midportion right coronary artery narrowing of approximately 40-50%  This is a focal narrowing  There is luminal regularities mild calcification noted in this distribution  2  Left main coronary artery short free of significant disease  3  Left anterior descending coronary artery extends around the apex with moderate calcification noted  There is luminal irregularities present with as much as a 60% narrowing noted at the junction of the proximal 2/3 and distal 1/3 of this large vessel  There are multiple diagonal branches all of which were patent with luminal irregularities but no significant stenosis  4  Left circumflex coronary artery is free of significant disease luminal irregularities are noted multiple obtuse marginal branches  iFR  Due to persistent discomfort and shortness of breath and the above-noted workup with the fact that he had borderline lesions in the distal LAD and mid right coronary artery iFR was undertaken  1  The LAD underwent evaluation which demonstrated a value 0 95 consistent with a nonsignificant stenosis  Pullback demonstrated the lesion of concern had the step-up on pullback  This is a noncritical stenosis    This was accomplished with a catheter XB 3 5  And a total of 9000 units of heparin was administered  2  The right coronary artery was evaluated and found to have a 0 95 value consistent with a nonsignificant stenosis  The JR4 guiding catheter was used  Pullback was obtained and demonstrated the lesion of concern to have the small step up  Conclusions:  1  Normal left ventricular end-diastolic pressure without evidence of significant aortic valvular gradient  2  Moderate to severe coronary artery disease involving the distal LAD and mid right coronary artery  3  iFR of the LAD and right coronary artery both 0 95 consistent with a nonsignificant stenosis  Recommendations:  1  Continue aggressive risk factor modification  2  Results procedure discussed with the patient  Of note was present    He will follow up his primary care physician and his cardiologist

## 2021-05-10 NOTE — H&P
Chart reviewed  Risks and benefits outlined in the cath lab recovery unit    Please see the office visit note

## 2021-05-10 NOTE — DISCHARGE INSTRUCTIONS
After Radial Heart Catheterization   WHAT YOU NEED TO KNOW:   What will happen after a radial heart catheterization? · You will be attached to a heart monitor until you are fully awake  A heart monitor is an EKG that stays on continuously to record your heart's electrical activity  Healthcare providers will monitor your vital signs and pulses in your arm  They will frequently check your pressure bandage for bleeding or swelling  · You may have a band wrapped tightly around your wrist  The band puts pressure on your wound and helps prevent bleeding  A healthcare provider can put air into the band or remove air from the band  A healthcare provider will gradually remove air from the band and decrease pressure on your wrist  The band may be removed in 2 hours or when your wound stops bleeding  · You will need to keep your wrist straight for 2 to 4 hours  Do not  push or pull with your arm  Arm movements can cause serious bleeding  After you are monitored for several hours, you may go home or may need to stay in the hospital overnight  What do I need to know before I go home? · Care for your wound as directed  Remove the pressure bandage in 24 hours or as directed  Mild bruising is normal and expected  A small bandage can be placed on your wound after you remove the pressure bandage  Do not put powders, lotions, or creams on your wound  They may cause your wound to get infected  Monitor your wound every day for signs of infection, such as redness, swelling, or pus  · Shower the day after your procedure or as directed  Remove your pressure bandage before you shower  Do not take baths or go in hot tubs or pools  Carefully wash the wound with soap and water  Pat the area dry  A small bandage can be placed on your wound after you shower  · Apply firm, steady pressure to your wound if it bleeds  Apply pressure with a clean gauze or towel for 5 to 10 minutes   Call 911 if bleeding becomes heavy or does not stop  · Drink liquids as directed  Liquids will help flush the contrast liquid from your body  Ask how much liquid to drink each day and which liquids are best for you  · Do not lift anything heavier than 5 pounds until directed by your healthcare provider  Heavy lifting can put stress on your wound and cause bleeding  Do not push or pull with the arm that was used for the procedure  Do not do vigorous activity for at least 48 hours  Vigorous activity may cause bleeding from your wound  Rest and do quiet activities  Take short walks around the house to prevent a blood clot  Ask your healthcare provider when you can return to your normal activities  · Do not drive or return to work until your healthcare provider says it is okay  Your healthcare provider may tell you to wait 48 hours before you drive to decrease your risk for bleeding  You may not be able to return to work for at least 2 days after your procedure if your job involves heavy lifting  What medicines may I need? You may need any of the following:  · Blood thinners  help prevent blood clots  Clots can cause strokes, heart attacks, and death  The following are general safety guidelines to follow while you are taking a blood thinner:    ? Watch for bleeding and bruising while you take blood thinners  Watch for bleeding from your gums or nose  Watch for blood in your urine and bowel movements  Use a soft washcloth on your skin, and a soft toothbrush to brush your teeth  This can keep your skin and gums from bleeding  If you shave, use an electric shaver  Do not play contact sports  ? Tell your dentist and other healthcare providers that you take a blood thinner  Wear a bracelet or necklace that says you take this medicine  ? Do not start or stop any other medicines unless your healthcare provider tells you to  Many medicines cannot be used with blood thinners      ? Take your blood thinner exactly as prescribed by your healthcare provider  Do not skip does or take less than prescribed  Tell your provider right away if you forget to take your blood thinner, or if you take too much  ? Warfarin  is a blood thinner that you may need to take  The following are things you should be aware of if you take warfarin:     § Foods and medicines can affect the amount of warfarin in your blood  Do not make major changes to your diet while you take warfarin  Warfarin works best when you eat about the same amount of vitamin K every day  Vitamin K is found in green leafy vegetables and certain other foods  Ask for more information about what to eat when you are taking warfarin  § You will need to see your healthcare provider for follow-up visits when you are on warfarin  You will need regular blood tests  These tests are used to decide how much medicine you need  · Acetaminophen  helps decrease pain and fever  This medicine is available without a doctor's order  Ask how much medicine is safe to take, and how often to take it  Acetaminophen can cause liver damage if not taken correctly  · Take your medicine as directed  Contact your healthcare provider if you think your medicine is not helping or if you have side effects  Tell him or her if you are allergic to any medicine  Keep a list of the medicines, vitamins, and herbs you take  Include the amounts, and when and why you take them  Bring the list or the pill bottles to follow-up visits  Carry your medicine list with you in case of an emergency  Call your local emergency number (911 in the [facility] [facility]) if:   · You have chest pain  · You have any of the following signs of a heart attack:      ? Squeezing, pressure, or pain in your chest    ? You may  also have any of the following:     ? Discomfort or pain in your back, neck, jaw, stomach, or arm    ? Shortness of breath    ? Nausea or vomiting    ?  Lightheadedness or a sudden cold sweat    · You have any of the following signs of a stroke:      ? Numbness or drooping on one side of your face     ? Weakness in an arm or leg    ? Confusion or difficulty speaking    ? Dizziness, a severe headache, or vision loss    · You cough up blood  · You have trouble breathing  · You cannot stop the bleeding from your wound even after you hold firm pressure for 10 minutes  When should I call my doctor? · You have a fever or chills  · Blood soaks through your bandage  · Your stitches come apart  · Your hand or arm feels numb, cool, or looks pale  · Your wound gets swollen quickly  · Your wound is red, swollen, or draining pus  · Your wound looks more bruised or you have new bruising on the side of your wrist      · You have nausea or are vomiting  · Your skin is itchy, swollen, or you have a rash  · You have questions or concerns about your condition or care  Healthy living tips: The following are general healthy guidelines  If your chest pain is caused by a heart problem, your healthcare provider will give you specific guidelines to follow  · Manage other health conditions  Diabetes and high cholesterol increases your risk for another heart attack and stroke  Talk to your healthcare provider about your management plan  He or she will make a plan that helps you manage your conditions  · Do not smoke  Nicotine and other chemicals in cigarettes and cigars can cause lung and heart damage  Ask your healthcare provider for information if you currently smoke and need help to quit  E-cigarettes or smokeless tobacco still contain nicotine  Talk to your healthcare provider before you use these products  · Eat a variety of healthy, low-fat, low-salt foods  Healthy foods include fruits, vegetables, whole-grain breads, low-fat dairy products, beans, lean meats, and fish  Ask for more information about a heart healthy diet  · Drink plenty of water every day  Your body is made of mostly water   Water helps your body to control your temperature and blood pressure  Ask your healthcare provider how much water you should drink every day  · Ask about activity  Your healthcare provider will tell you which activities to limit or avoid  Ask when you can drive, return to work, and have sex  Ask about the best exercise plan for you  · Maintain a healthy weight  Ask your healthcare provider how much you should weigh  Ask him or her to help you create a weight loss plan if you are overweight  · Get the flu and pneumonia vaccines  All adults should get the influenza (flu) vaccine  Get it every year as soon as it becomes available  The pneumococcal vaccine is given to adults aged 72 years or older  The vaccine is given every 5 years to prevent pneumococcal disease, such as pneumonia  If you have a stent:   · Carry your stent card with you at all times  · Let all healthcare providers know that you have a stent  CARE AGREEMENT:   You have the right to help plan your care  Learn about your health condition and how it may be treated  Discuss treatment options with your healthcare providers to decide what care you want to receive  You always have the right to refuse treatment  The above information is an  only  It is not intended as medical advice for individual conditions or treatments  Talk to your doctor, nurse or pharmacist before following any medical regimen to see if it is safe and effective for you  © Copyright [address] Information is for End User's use only and may not be sold, redistributed or otherwise used for commercial purposes   All illustrations and images included in CareNotes® are the copyrighted property of [employer]  or [address] [place], [place] [address] [employer]

## 2021-05-10 NOTE — BRIEF OP NOTE (RAD/CATH)
Procedure completed and pt tolerated well - pt returned to room and report to RN - TR band intact and without bleeding or hematoma and nv intact and pt denies any chest pain

## 2021-05-12 ENCOUNTER — OFFICE VISIT (OUTPATIENT)
Dept: NEUROLOGY | Facility: CLINIC | Age: 76
End: 2021-05-12
Payer: COMMERCIAL

## 2021-05-12 VITALS
DIASTOLIC BLOOD PRESSURE: 70 MMHG | SYSTOLIC BLOOD PRESSURE: 100 MMHG | HEIGHT: 71 IN | HEART RATE: 70 BPM | WEIGHT: 219 LBS | BODY MASS INDEX: 30.66 KG/M2

## 2021-05-12 DIAGNOSIS — Z86.73 HISTORY OF CVA (CEREBROVASCULAR ACCIDENT): ICD-10-CM

## 2021-05-12 DIAGNOSIS — R42 DIZZINESS AND GIDDINESS: ICD-10-CM

## 2021-05-12 DIAGNOSIS — E11.42 DIABETIC PERIPHERAL NEUROPATHY (HCC): ICD-10-CM

## 2021-05-12 DIAGNOSIS — D33.0 BENIGN NEOPLASM OF SUPRATENTORIAL REGION OF BRAIN (HCC): ICD-10-CM

## 2021-05-12 DIAGNOSIS — I10 HYPERTENSION, ESSENTIAL: ICD-10-CM

## 2021-05-12 PROCEDURE — 99214 OFFICE O/P EST MOD 30 MIN: CPT | Performed by: PSYCHIATRY & NEUROLOGY

## 2021-05-12 RX ORDER — GABAPENTIN 300 MG/1
300 CAPSULE ORAL 2 TIMES DAILY
Qty: 60 CAPSULE | Refills: 0 | Status: SHIPPED | OUTPATIENT
Start: 2021-05-12 | End: 2021-07-13 | Stop reason: SDUPTHER

## 2021-05-12 NOTE — PROGRESS NOTES
Efrain Tee is a 70 y o  male  Chief Complaint   Patient presents with    Cerebrovascular Accident       Assessment:  1  Diabetic peripheral neuropathy ([address] )    2  Hypertension, essential    3  History of CVA (cerebrovascular accident)    4  Dizziness and giddiness    5  Benign neoplasm of supratentorial region of brain [facility])        Plan:   continue with Neurontin 300 mg 2 times a day  Continue with aspirin  follow-up in 6 months    Discussion:   patient was advised to  Continue with Neurontin for his diabetic neuropathy and have his blood work monitored by his family physician, also was advised to continue follow-up with the neurosurgeon, he does have some coronary artery disease for which she was advised to follow-up with his cardiologist, he is already on a statin, stroke education given to the patient and was advised to keep his blood pressure cholesterol and sugar under control, to go to the hospital if has any worsening symptoms and call me otherwise to see me back in 6 months and follow up with his other physicians  Subjective:    HPI    patient is here in follow-up for his history of CVA and diabetic neuropathy, since his last visit he is doing good with his neuropathy he had the angiogram done recently for his heart hand does have coronary artery disease and is in follow up with his cardiologist, he denies having any dizziness no headaches no speech difficulty no stroke-like symptoms no side effects to Neurontin no tinnitus no focal weakness no falls his numbness and tingling is under control in his feet no other complaints      Vitals:    05/12/21 1519   BP: 100/70   BP Location: Left arm   Patient Position: Sitting   Cuff Size: Large   Pulse: 70   Weight: 99 3 kg (219 lb)   Height: 5' 11" (1 803 m)       Current Medications    Current Outpatient Medications:     amLODIPine (NORVASC) 10 mg tablet, every morning  , Disp: , Rfl:     aspirin (ASPIR-LOW) 81 mg EC tablet, Take 1 tablet by mouth daily, Disp: , Rfl:     D3-50 1 25 MG (07346 UT) capsule, Take 50,000 Units by mouth once a week, Disp: , Rfl:     gabapentin (NEURONTIN) 300 mg capsule, Take 1 capsule (300 mg total) by mouth 2 (two) times a day, Disp: 60 capsule, Rfl: 0    glipiZIDE (GLUCOTROL XL) 2 5 mg 24 hr tablet, Take 1 tablet by mouth daily, Disp: , Rfl:     Jardiance 10 MG TABS, Take 10 mg by mouth daily, Disp: , Rfl:     losartan (COZAAR) 100 MG tablet, take 1 tablet by mouth once daily, Disp: 90 tablet, Rfl: 3    metoprolol succinate (TOPROL-XL) 50 mg 24 hr tablet, Take 1 tablet (50 mg total) by mouth 2 (two) times a day, Disp: 180 tablet, Rfl: 2    ONETOUCH DELICA LANCETS 28C MISC, use ONE LANCET twice a day, Disp: , Rfl: 0    ONETOUCH VERIO test strip, TEST twice a day, Disp: , Rfl: 0    pravastatin (PRAVACHOL) 10 mg tablet, Take 10 mg by mouth daily, Disp: , Rfl:     sildenafil (VIAGRA) 50 MG tablet, Take 50 mg by mouth daily as needed, Disp: , Rfl:     sitaGLIPtin (JANUVIA) 100 mg tablet, Take 1 tablet by mouth daily, Disp: , Rfl:   No current facility-administered medications for this visit  Facility-Administered Medications Ordered in Other Visits:     sodium chloride 0 9 % infusion, 50 mL/hr, Intravenous, Continuous, Ben Merritt MD, Stopped at 05/10/21 1550      Allergies  Patient has no known allergies  Past Medical History  Past Medical History:   Diagnosis Date    Anxiety     Bradycardia     Cerebral infarction [facility] [address] )     Diabetes mellitus ([facility] [facility] )     Diabetic neuropathy (HCC)     Dilated aortic root (HCC)     Essential hypertension     Exercise-induced shortness of breath     Hypertension          Past Surgical History:  Past Surgical History:   Procedure Laterality Date    APPENDECTOMY           Family History:  Family History   Problem Relation Age of Onset    Ovarian cancer Mother     Cancer Sister        Social History:   reports that he has quit smoking   He has never used smokeless tobacco  He reports current alcohol use  He reports that he does not use drugs  I have reviewed the past medical history, surgical history, social and family history, current medications, allergies vitals, review of systems, and updated this information as appropriate today  Objective:    Physical Exam    Neurological Exam    GENERAL:  Cooperative in no acute distress  Well-developed and well-nourished    HEAD and NECK   Head is atraumatic normocephalic with no lesions or masses  Neck is supple with full range of motion    CARDIOVASCULAR  Carotid Arteries-no carotid bruits  NEUROLOGIC:  Mental Status-the patient is awake alert and oriented without aphasia or apraxia  Cranial Nerves: Visual fields are full to confrontation  Extraocular movements are full without nystagmus  Pupils are 2-1/2 mm and reactive  Face is symmetrical to light touch  Movements of facial expression move symmetrically  Hearing is normal to finger rub bilaterally  Soft palate lifts symmetrically  Shoulder shrug is symmetrical  Tongue is midline without atrophy  Motor: No drift is noted on arm extension  Strength is full in the upper and lower extremities with normal bulk and tone  Coordination: Finger to nose testing is performed accurately  Gait reveals a normal base with symmetrical arm swing  ROS:  Review of Systems   Constitutional: Negative  Negative for appetite change and fever  HENT: Negative  Negative for hearing loss, tinnitus, trouble swallowing and voice change  Eyes: Negative  Negative for photophobia and pain  Respiratory: Negative  Negative for shortness of breath  Cardiovascular: Negative  Negative for palpitations  Gastrointestinal: Negative  Negative for nausea and vomiting  Endocrine: Negative  Negative for cold intolerance  Genitourinary: Negative  Negative for dysuria, frequency and urgency  Musculoskeletal: Negative  Negative for myalgias and neck pain  Skin: Negative  Negative for rash  Neurological: Negative  Negative for dizziness, tremors, seizures, syncope, facial asymmetry, speech difficulty, weakness, light-headedness, numbness and headaches  Hematological: Negative  Does not bruise/bleed easily  Psychiatric/Behavioral: Negative  Negative for confusion, hallucinations and sleep disturbance

## 2021-05-17 ENCOUNTER — HOSPITAL ENCOUNTER (OUTPATIENT)
Dept: MRI IMAGING | Facility: HOSPITAL | Age: 76
Discharge: HOME | End: 2021-05-17
Payer: COMMERCIAL

## 2021-05-17 ENCOUNTER — TELEPHONE (OUTPATIENT)
Dept: NEUROSURGERY | Facility: CLINIC | Age: 76
End: 2021-05-17

## 2021-05-17 ENCOUNTER — HOSPITAL ENCOUNTER (EMERGENCY)
Facility: HOSPITAL | Age: 76
Discharge: HOME | End: 2021-05-17
Attending: EMERGENCY MEDICINE
Payer: COMMERCIAL

## 2021-05-17 ENCOUNTER — APPOINTMENT (EMERGENCY)
Dept: RADIOLOGY | Facility: HOSPITAL | Age: 76
End: 2021-05-17
Payer: COMMERCIAL

## 2021-05-17 VITALS
OXYGEN SATURATION: 98 % | BODY MASS INDEX: 30.54 KG/M2 | RESPIRATION RATE: 17 BRPM | HEART RATE: 51 BPM | DIASTOLIC BLOOD PRESSURE: 84 MMHG | SYSTOLIC BLOOD PRESSURE: 136 MMHG | WEIGHT: 219 LBS | TEMPERATURE: 98.4 F

## 2021-05-17 DIAGNOSIS — D33.0 BENIGN NEOPLASM OF SUPRATENTORIAL REGION OF BRAIN (HCC): ICD-10-CM

## 2021-05-17 DIAGNOSIS — R90.89 ABNORMAL FINDING ON MRI OF BRAIN: ICD-10-CM

## 2021-05-17 DIAGNOSIS — I63.9 CVA (CEREBRAL VASCULAR ACCIDENT) (HCC): Primary | ICD-10-CM

## 2021-05-17 LAB
ALBUMIN SERPL BCP-MCNC: 3.4 G/DL (ref 3.5–5)
ALP SERPL-CCNC: 84 U/L (ref 46–116)
ALT SERPL W P-5'-P-CCNC: 26 U/L (ref 12–78)
ANION GAP SERPL CALCULATED.3IONS-SCNC: 9 MMOL/L (ref 4–13)
APTT PPP: 26 SECONDS (ref 23–37)
AST SERPL W P-5'-P-CCNC: 16 U/L (ref 5–45)
BASOPHILS # BLD AUTO: 0.01 THOUSANDS/ΜL (ref 0–0.1)
BASOPHILS NFR BLD AUTO: 0 % (ref 0–1)
BILIRUB SERPL-MCNC: 0.4 MG/DL (ref 0.2–1)
BUN SERPL-MCNC: 15 MG/DL (ref 5–25)
CALCIUM ALBUM COR SERPL-MCNC: 9.5 MG/DL (ref 8.3–10.1)
CALCIUM SERPL-MCNC: 9 MG/DL (ref 8.3–10.1)
CHLORIDE SERPL-SCNC: 106 MMOL/L (ref 100–108)
CO2 SERPL-SCNC: 27 MMOL/L (ref 21–32)
CREAT SERPL-MCNC: 1.24 MG/DL (ref 0.6–1.3)
EOSINOPHIL # BLD AUTO: 0.02 THOUSAND/ΜL (ref 0–0.61)
EOSINOPHIL NFR BLD AUTO: 1 % (ref 0–6)
ERYTHROCYTE [DISTWIDTH] IN BLOOD BY AUTOMATED COUNT: 13.9 % (ref 11.6–15.1)
GFR SERPL CREATININE-BSD FRML MDRD: 66 ML/MIN/1.73SQ M
GLUCOSE SERPL-MCNC: 109 MG/DL (ref 65–140)
HCT VFR BLD AUTO: 46.5 % (ref 36.5–49.3)
HGB BLD-MCNC: 14.9 G/DL (ref 12–17)
IMM GRANULOCYTES # BLD AUTO: 0.02 THOUSAND/UL (ref 0–0.2)
IMM GRANULOCYTES NFR BLD AUTO: 1 % (ref 0–2)
INR PPP: 1.11 (ref 0.84–1.19)
LYMPHOCYTES # BLD AUTO: 1.4 THOUSANDS/ΜL (ref 0.6–4.47)
LYMPHOCYTES NFR BLD AUTO: 41 % (ref 14–44)
MCH RBC QN AUTO: 26.9 PG (ref 26.8–34.3)
MCHC RBC AUTO-ENTMCNC: 32 G/DL (ref 31.4–37.4)
MCV RBC AUTO: 84 FL (ref 82–98)
MONOCYTES # BLD AUTO: 0.34 THOUSAND/ΜL (ref 0.17–1.22)
MONOCYTES NFR BLD AUTO: 10 % (ref 4–12)
NEUTROPHILS # BLD AUTO: 1.6 THOUSANDS/ΜL (ref 1.85–7.62)
NEUTS SEG NFR BLD AUTO: 47 % (ref 43–75)
NRBC BLD AUTO-RTO: 0 /100 WBCS
PLATELET # BLD AUTO: 140 THOUSANDS/UL (ref 149–390)
PMV BLD AUTO: 10 FL (ref 8.9–12.7)
POTASSIUM SERPL-SCNC: 4.2 MMOL/L (ref 3.5–5.3)
PROT SERPL-MCNC: 7.1 G/DL (ref 6.4–8.2)
PROTHROMBIN TIME: 13.7 SECONDS (ref 11.6–14.5)
RBC # BLD AUTO: 5.54 MILLION/UL (ref 3.88–5.62)
SODIUM SERPL-SCNC: 142 MMOL/L (ref 136–145)
TROPONIN I SERPL-MCNC: 0.02 NG/ML
WBC # BLD AUTO: 3.39 THOUSAND/UL (ref 4.31–10.16)

## 2021-05-17 PROCEDURE — 80053 COMPREHEN METABOLIC PANEL: CPT | Performed by: EMERGENCY MEDICINE

## 2021-05-17 PROCEDURE — 85730 THROMBOPLASTIN TIME PARTIAL: CPT | Performed by: EMERGENCY MEDICINE

## 2021-05-17 PROCEDURE — 84484 ASSAY OF TROPONIN QUANT: CPT | Performed by: EMERGENCY MEDICINE

## 2021-05-17 PROCEDURE — G1004 CDSM NDSC: HCPCS

## 2021-05-17 PROCEDURE — 36415 COLL VENOUS BLD VENIPUNCTURE: CPT | Performed by: EMERGENCY MEDICINE

## 2021-05-17 PROCEDURE — 71045 X-RAY EXAM CHEST 1 VIEW: CPT

## 2021-05-17 PROCEDURE — 99285 EMERGENCY DEPT VISIT HI MDM: CPT | Performed by: EMERGENCY MEDICINE

## 2021-05-17 PROCEDURE — 99284 EMERGENCY DEPT VISIT MOD MDM: CPT

## 2021-05-17 PROCEDURE — 93005 ELECTROCARDIOGRAM TRACING: CPT

## 2021-05-17 PROCEDURE — 99285 EMERGENCY DEPT VISIT HI MDM: CPT | Performed by: PSYCHIATRY & NEUROLOGY

## 2021-05-17 PROCEDURE — 85025 COMPLETE CBC W/AUTO DIFF WBC: CPT | Performed by: EMERGENCY MEDICINE

## 2021-05-17 PROCEDURE — A9585 GADOBUTROL INJECTION: HCPCS | Performed by: NURSE PRACTITIONER

## 2021-05-17 PROCEDURE — 70553 MRI BRAIN STEM W/O & W/DYE: CPT

## 2021-05-17 PROCEDURE — 85610 PROTHROMBIN TIME: CPT | Performed by: EMERGENCY MEDICINE

## 2021-05-17 RX ORDER — ASPIRIN 81 MG/1
243 TABLET, CHEWABLE ORAL ONCE
Status: COMPLETED | OUTPATIENT
Start: 2021-05-17 | End: 2021-05-17

## 2021-05-17 RX ADMIN — GADOBUTROL 9 ML: 604.72 INJECTION INTRAVENOUS at 10:08

## 2021-05-17 RX ADMIN — ASPIRIN 243 MG: 81 TABLET, CHEWABLE ORAL at 12:14

## 2021-05-17 NOTE — ASSESSMENT & PLAN NOTE
Jessica Pabon is a 70 y o  male with HTN, HLD, DM type 2, diabetic neuropathy, right frontal meningioma, CAD s/p recent cardiac cath (05/10/2021) on ASA, prior CVA who presents to [place] [address] ED on 05/17/2021 from outpatient MRI after abnormal MRI brain read concerning for acute infarct  Multifocal embolic appearing infarcts affecting 2 different vascular territories, Left MCA and Right PCA  Strong suspicion for fernanda-procedural etiology secondary to recent cardiac cath  Cannot rule out cardioembolic etiology such as arrhythmia  Instructed patient to have a discussion with outpatient cardiologist and cardiologist who performed the catheterization to comment on the likelihood of infarct secondary to procedure based on the amount of disease noted  Plan:  - Consider outpatient loop monitor placement in the outpatient setting, will defer to outpatient cardiologist   - Defer echo to outpatient cardiologist   - Carole Ceron to obtain outpatient Carotid duplex study   - No need to repeat fasting lipid panel and hemoglobin A1c  - S/p  mg x 1  - Continue ASA 81 mg daily   - Continue statin therapy   - Telemetry  - Goal normotension, euglycemia, normothermia   - Frequent neuro checks  - PT/OT/speech  - Stroke education  - Medical management and supportive care per primary team, notify with changes    Imaging/Labs:  - MRI brain 05/17/2021:  · Two foci of diffusion restriction in separate vascular territories in the left middle cerebral artery and right posterior cerebral artery territories indicative of acute/recent multifocal small cortical infarctions  · Mild chronic microangiopathy noted elsewhere  · Small right frontal meningioma stable  - Lipid panel 04/20/2021:  Total cholesterol 152, Triglycerides 86, HDL 48, LDL 87  - Hemoglobin A1c 04/20/2021: Elevated 9 1

## 2021-05-17 NOTE — ED PROVIDER NOTES
History  Chief Complaint   Patient presents with    Evaluation of Abnormal Diagnostic Test     pt reports that he had an MRI and received the results today, pt states he has no complaints at this time, pt said he came in because he was afraid      Patient is a 74-year-old male with past medical history of diabetes, hypertension, hyperlipidemia, presents to the emergency department for abnormal outpatient MRI of the brain  Patient reports that he had a routine MRI of the brain for evaluation of dizziness as well as for follow-up of a meningioma he has had  He was sent here from MRI for abnormal results  Patient currently has no complaints  He states over the past several months he does get episodic dizziness both a lightheaded feeling which normally comes on when he bends down to pick something up and then stands up  The lightheadedness last very briefly in the goes away  He does occasionally get a vertigo feeling in which everything is spinning but this too is very brief and goes away on its own  He denies that the lightheadedness or vertigo is debilitating or interfering with his ADLs  He denies any lateralizing extremity weakness, lateralizing paresthesia, difficulty with speech or getting words out, ataxia, facial asymmetry or other stroke-like symptoms  He denies any headaches, change in vision, tinnitus or loss of hearing, recent fever, chills, cough, URI symptoms, chest pain, palpitations, dyspnea, abdominal pain, nausea, vomiting,, urinary symptoms, skin rash or color change  According to MRI from earlier today: "IMPRESSION:  1  Two foci of diffusion restriction in separate vascular territories in the left middle cerebral artery and right posterior cerebral artery territories indicative of acute/recent multifocal small cortical infarctions  A central embolic source such as   atrial fibrillation should be excluded  Further clinical assessment and workup advised    2   Mild, chronic microangiopathy elsewhere  3   Small right frontal meningioma is stable "      History provided by:  Patient   used: No    Evaluation of Abnormal Diagnostic Test      Prior to Admission Medications   Prescriptions Last Dose Informant Patient Reported? Taking?    D3-50 1 25 MG (12289 UT) capsule  Self Yes No   Sig: Take 50,000 Units by mouth once a week   Jardiance 10 MG TABS  Self Yes No   Sig: Take 10 mg by mouth daily   ONETOUCH DELICA LANCETS 65U MISC  Self Yes No   Sig: use ONE LANCET twice a day   ONETOUCH VERIO test strip  Self Yes No   Sig: TEST twice a day   amLODIPine (NORVASC) 10 mg tablet  Self Yes No   Sig: every morning     aspirin (ASPIR-LOW) 81 mg EC tablet 5/16/2021 at Unknown time Self Yes Yes   Sig: Take 1 tablet by mouth daily   gabapentin (NEURONTIN) 300 mg capsule   No No   Sig: Take 1 capsule (300 mg total) by mouth 2 (two) times a day   glipiZIDE (GLUCOTROL XL) 2 5 mg 24 hr tablet  Self Yes No   Sig: Take 1 tablet by mouth daily   losartan (COZAAR) 100 MG tablet  Self No No   Sig: take 1 tablet by mouth once daily   metoprolol succinate (TOPROL-XL) 50 mg 24 hr tablet  Self No No   Sig: Take 1 tablet (50 mg total) by mouth 2 (two) times a day   pravastatin (PRAVACHOL) 10 mg tablet  Self Yes No   Sig: Take 10 mg by mouth daily   sildenafil (VIAGRA) 50 MG tablet  Self Yes No   Sig: Take 50 mg by mouth daily as needed   sitaGLIPtin (JANUVIA) 100 mg tablet  Self Yes No   Sig: Take 1 tablet by mouth daily      Facility-Administered Medications: None       Past Medical History:   Diagnosis Date    Anxiety     Bradycardia     Cerebral infarction (HCC)     Diabetes mellitus (HCC)     Diabetic neuropathy (HCC)     Dilated aortic root (HCC)     Essential hypertension     Exercise-induced shortness of breath     Hypertension        Past Surgical History:   Procedure Laterality Date    APPENDECTOMY         Family History   Problem Relation Age of Onset    Ovarian cancer Mother     Cancer Sister      I have reviewed and agree with the history as documented  E-Cigarette/Vaping    E-Cigarette Use Never User      E-Cigarette/Vaping Substances    Nicotine No     THC No     CBD No     Flavoring No     Other No     Unknown No      Social History     Tobacco Use    Smoking status: Former Smoker    Smokeless tobacco: Never Used   Substance Use Topics    Alcohol use: Yes     Comment: occasionally    Drug use: No       Review of Systems   Constitutional: Negative for chills and fever  HENT: Negative for congestion, ear pain, hearing loss, rhinorrhea, sore throat and tinnitus  Eyes: Negative for photophobia, pain and visual disturbance  Respiratory: Negative for cough, chest tightness, shortness of breath and wheezing  Cardiovascular: Negative for chest pain and palpitations  Gastrointestinal: Negative for abdominal pain, constipation, diarrhea, nausea and vomiting  Genitourinary: Negative for dysuria, flank pain, frequency and hematuria  Musculoskeletal: Negative for back pain, neck pain and neck stiffness  Skin: Negative for color change, pallor, rash and wound  Allergic/Immunologic: Negative for immunocompromised state  Neurological: Negative for dizziness, seizures, syncope, facial asymmetry, speech difficulty, weakness, light-headedness, numbness and headaches  Hematological: Negative for adenopathy  Does not bruise/bleed easily  Psychiatric/Behavioral: Negative for confusion and decreased concentration  All other systems reviewed and are negative  Physical Exam  Physical Exam  Vitals signs and nursing note reviewed  Constitutional:       General: He is not in acute distress  Appearance: Normal appearance  He is well-developed  He is not ill-appearing, toxic-appearing or diaphoretic  HENT:      Head: Normocephalic and atraumatic        Right Ear: External ear normal       Left Ear: External ear normal       Nose: Nose normal  Mouth/Throat:      Mouth: Mucous membranes are moist       Pharynx: Oropharynx is clear  No oropharyngeal exudate  Eyes:      Extraocular Movements: Extraocular movements intact  Conjunctiva/sclera: Conjunctivae normal       Pupils: Pupils are equal, round, and reactive to light  Neck:      Musculoskeletal: Normal range of motion and neck supple  No neck rigidity  Vascular: No carotid bruit or JVD  Cardiovascular:      Rate and Rhythm: Normal rate and regular rhythm  Pulses: Normal pulses  Heart sounds: Normal heart sounds  No murmur  No friction rub  No gallop  Pulmonary:      Effort: Pulmonary effort is normal  No respiratory distress  Breath sounds: Normal breath sounds  No wheezing, rhonchi or rales  Abdominal:      General: Bowel sounds are normal  There is no distension  Palpations: Abdomen is soft  There is no mass  Tenderness: There is no abdominal tenderness  There is no guarding or rebound  Musculoskeletal: Normal range of motion  General: No swelling or tenderness  Skin:     General: Skin is warm and dry  Coloration: Skin is not pale  Findings: No erythema or rash  Neurological:      General: No focal deficit present  Mental Status: He is alert and oriented to person, place, and time  Cranial Nerves: No cranial nerve deficit  Sensory: No sensory deficit  Motor: No weakness  Comments: Normal speech  Normal gait  Normal finger-to-nose and heel-to-shin exam   5/5 strength throughout without extremity drift x4     Psychiatric:         Mood and Affect: Mood normal          Behavior: Behavior normal          Vital Signs  ED Triage Vitals [05/17/21 1054]   Temperature Pulse Respirations Blood Pressure SpO2   97 8 °F (36 6 °C) (!) 52 18 159/84 98 %      Temp Source Heart Rate Source Patient Position - Orthostatic VS BP Location FiO2 (%)   Oral Monitor Sitting Left arm --      Pain Score       No Pain         Vitals: 05/17/21 1054 05/17/21 1415 05/17/21 1453   BP: 159/84 163/94 136/84   BP Location: Left arm  Left arm   Pulse: (!) 52 (!) 52 (!) 51   Resp: 18 16 17   Temp: 97 8 °F (36 6 °C)  98 4 °F (36 9 °C)   TempSrc: Oral  Oral   SpO2: 98% 99% 98%   Weight: 99 3 kg (219 lb)         Visual Acuity  Visual Acuity      Most Recent Value   L Pupil Size (mm)  5   R Pupil Size (mm)  5          ED Medications  Medications   aspirin chewable tablet 243 mg (243 mg Oral Given 5/17/21 1214)       Diagnostic Studies  Results Reviewed     Procedure Component Value Units Date/Time    CBC and differential [[id number]]  (Abnormal) Collected: 05/17/21 1213    Lab Status: Final result Specimen: Blood from Arm, Right Updated: 05/17/21 1312     WBC 3 39 Thousand/uL      RBC 5 54 Million/uL      Hemoglobin 14 9 g/dL      Hematocrit 46 5 %      MCV 84 fL      MCH 26 9 pg      MCHC 32 0 g/dL      RDW 13 9 %      MPV 10 0 fL      Platelets 876 Thousands/uL      nRBC 0 /100 WBCs      Neutrophils Relative 47 %      Immat GRANS % 1 %      Lymphocytes Relative 41 %      Monocytes Relative 10 %      Eosinophils Relative 1 %      Basophils Relative 0 %      Neutrophils Absolute 1 60 Thousands/µL      Immature Grans Absolute 0 02 Thousand/uL      Lymphocytes Absolute 1 40 Thousands/µL      Monocytes Absolute 0 34 Thousand/µL      Eosinophils Absolute 0 02 Thousand/µL      Basophils Absolute 0 01 Thousands/µL     Troponin I [[id number]]  (Normal) Collected: 05/17/21 1213    Lab Status: Final result Specimen: Blood from Arm, Right Updated: 05/17/21 1239     Troponin I 0 02 ng/mL     Comprehensive metabolic panel [[id number]]  (Abnormal) Collected: 05/17/21 1213    Lab Status: Final result Specimen: Blood from Arm, Right Updated: 05/17/21 1237     Sodium 142 mmol/L      Potassium 4 2 mmol/L      Chloride 106 mmol/L      CO2 27 mmol/L      ANION GAP 9 mmol/L      BUN 15 mg/dL      Creatinine 1 24 mg/dL      Glucose 109 mg/dL      Calcium 9 0 mg/dL      Corrected Calcium 9 5 mg/dL      AST 16 U/L      ALT 26 U/L      Alkaline Phosphatase 84 U/L      Total Protein 7 1 g/dL      Albumin 3 4 g/dL      Total Bilirubin 0 40 mg/dL      eGFR 66 ml/min/1 73sq m     Narrative:      [place] guidelines for Chronic Kidney Disease (CKD):     Stage 1 with normal or high GFR (GFR > 90 mL/min/1 73 square meters)    Stage 2 Mild CKD (GFR = 60-89 mL/min/1 73 square meters)    Stage 3A Moderate CKD (GFR = 45-59 mL/min/1 73 square meters)    Stage 3B Moderate CKD (GFR = 30-44 mL/min/1 73 square meters)    Stage 4 Severe CKD (GFR = 15-29 mL/min/1 73 square meters)    Stage 5 End Stage CKD (GFR <15 mL/min/1 73 square meters)  Note: GFR calculation is accurate only with a steady state creatinine    Protime-INR [[id number]]  (Normal) Collected: 05/17/21 1213    Lab Status: Final result Specimen: Blood from Arm, Right Updated: 05/17/21 1232     Protime 13 7 seconds      INR 1 11    APTT [[id number]]  (Normal) Collected: 05/17/21 1213    Lab Status: Final result Specimen: Blood from Arm, Right Updated: 05/17/21 1232     PTT 26 seconds                  XR chest 1 view portable   ED Interpretation by Verónica Baumann DO (05/17 1236)   No acute abnormality in the chest       Final Result by Miriam Faith MD (05/17 1423)      No acute cardiopulmonary disease                    Workstation performed: [id number]                    Procedures  ECG 12 Lead Documentation Only    Date/Time: 5/17/2021 1:07 PM  Performed by: Verónica Baumann DO  Authorized by: Verónica Baumann DO     ECG reviewed by me, the ED Provider: yes    Patient location:  ED  Previous ECG:     Previous ECG:  Compared to current    Comparison ECG info:  5-  Rate:     ECG rate:  50    ECG rate assessment: bradycardic    Rhythm:     Rhythm: sinus bradycardia and A-V block      Rhythm comment:  1st degree AV block  Ectopy:     Ectopy: none    QRS:     QRS axis:  Normal    QRS intervals:  Normal  Conduction: Conduction: normal    ST segments:     ST segments:  Normal  T waves:     T waves: normal      T waves comment:  Isolated T wave inversion in III (new)  ED Course  ED Course as of May 17 1456   Mon May 17, 2021   1243 Patient has expressed to me and nursing that he does not want to be admitted  I tiger texted on-call neurologist, Dr Maren Dillard, to request bedside neurology consult       8801 Speaking with Dr Maren Dillard with neurology and he will speak to his AP  Patient just had a cardiac catheterization on 5/10/21 which showed "Conclusions:  1  Normal left ventricular end-diastolic pressure without evidence of significant aortic valvular gradient  2  Moderate to severe coronary artery disease involving the distal LAD and mid right coronary artery  3  iFR of the LAD and right coronary artery both 0 95 consistent with a nonsignificant stenosis "      1303 Last Echo was in March 2021, EF 60%  1317 Chronic and stable, according to prior labs  WBC(!): 3 39   1317 Neurology to see patient at bedside  1408 Neurology at bedside  3999 Neurology evaluated patient and although they feel observation for further testing is recommended, they do not feel it is unreasonable for patient to be discharged given he is asymptomatic and to follow up closely with his cardiologist, Dr Cleopatra Gr for outpatient cardiac monitoring for arrhythmia and for repeat Echo  No further blood thinner recommendations by neuro other than his normal 81mg ASA daily                      Stroke Assessment     Row Name 05/17/21 1409             NIH Stroke Scale    Interval  Baseline      Level of Consciousness (1a )  0      LOC Questions (1b )  0      LOC Commands (1c )  0      Best Gaze (2 )  0      Visual (3 )  0      Facial Palsy (4 )  0      Motor Arm, Left (5a )  0      Motor Arm, Right (5b )  0      Motor Leg, Left (6a )  0      Motor Leg, Right (6b )  0      Limb Ataxia (7 )  0      Sensory (8 )  0      Best Language (9 )  0      Dysarthria (10 )  0      Extinction and Inattention (11 ) (Formerly Neglect)  0      Total  0          First Filed Value   TPA Decision  Patient not a TPA candidate  Patient is not a candidate options  Symptoms resolved/clearly non disabling , Unclear time of onset outside appropriate time window  MDM  Number of Diagnoses or Management Options  Abnormal finding on MRI of brain:   CVA (cerebral vascular accident) [facility]):   Diagnosis management comments: 72-year-old male presents to the ED from outpatient MRI for abnormal MRI findings showing 2 discrete areas of potentially acute or subacute stroke  Patient currently asymptomatic and has completely normal neurologic exam however I did recommend further workup with labs and likely admission for Neurology consultation  Amount and/or Complexity of Data Reviewed  Clinical lab tests: ordered and reviewed  Tests in the radiology section of CPT®: reviewed and ordered  Tests in the medicine section of CPT®: ordered and reviewed  Discuss the patient with other providers: yes (Neurologist, Dr Rubi Gonzalez)  Independent visualization of images, tracings, or specimens: yes        Disposition  Final diagnoses:   CVA (cerebral vascular accident) ([facility] [address] )   Abnormal finding on MRI of brain     Time reflects when diagnosis was documented in both MDM as applicable and the Disposition within this note     Time User Action Codes Description Comment    5/17/2021  1:02 PM Everlene Sprain E Add [I63 9] CVA (cerebral vascular accident) ([address] )     5/17/2021  1:02 PM Everlene Sprain E Add [R90 [id number]] Abnormal finding on MRI of brain       ED Disposition     ED Disposition Condition Date/Time Comment    Discharge Stable Mon May 17, 2021  2:55 PM Estelle Diaz discharge to home/self care              Follow-up Information     Follow up With Specialties Details Why Contact Info Additional Information    Luz Mims MD Family Medicine Schedule an appointment as soon as possible for a visit   [address] [facility] [address] [facility]       Flower Phipps MD Cardiology Schedule an appointment as soon as possible for a visit   [address] [facility] [address]       Neurology [facility] Neurology Schedule an appointment as soon as possible for a visit   [address]  [phone] Neurology [facility], [address], [place], [address] [facility]    [facility] Emergency Department Emergency Medicine Go to  If symptoms worsen [address] Emergency Department, [address] [place] [address], [id number]          Patient's Medications   Discharge Prescriptions    No medications on file     No discharge procedures on file      PDMP Review     None          ED Provider  Electronically Signed by           Michelle De Leon DO  05/17/21 3979

## 2021-05-17 NOTE — ASSESSMENT & PLAN NOTE
- Known right frontal meningioma  - Follows with outpatient neurosurgery who ordered outpatient MRI brain on 05/17 for 6 month evaluation   - Meningioma appears stable on recent MRI brain w wo

## 2021-05-17 NOTE — TELEPHONE ENCOUNTER
Received a call from Dr Rama Cochran, radiologist, reporting there are two acute new strokes noted on the MRI brain  Dr Rama Cochran will send patient to the ER for further assessment       [facility] ER and notified them of the upcoming patient's arrival

## 2021-05-17 NOTE — CONSULTS
Consultation - Neurology   Emilee Tan 70 y o  male MRN: [id number]  Unit/Bed#: ED 24 Encounter: [id number]      Assessment/Plan   CVA (cerebral vascular accident) [facility])  Corrina is a 70 y o  male with HTN, HLD, DM type 2, diabetic neuropathy, right frontal meningioma, CAD s/p recent cardiac cath (05/10/2021) on ASA, prior CVA who presents to [facility] ED on 05/17/2021 from outpatient MRI after abnormal MRI brain read concerning for acute infarct  Multifocal embolic appearing infarcts affecting 2 different vascular territories, Left MCA and Right PCA  Strong suspicion for fernanda-procedural etiology secondary to recent cardiac cath  Cannot rule out cardioembolic etiology such as arrhythmia  Instructed patient to have a discussion with outpatient cardiologist and cardiologist who performed the catheterization to comment on the likelihood of infarct secondary to procedure based on the amount of disease noted  Plan:  - Consider outpatient loop monitor placement in the outpatient setting, will defer to outpatient cardiologist   - Defer echo to outpatient cardiologist   - Pema Haines to obtain outpatient Carotid duplex study   - No need to repeat fasting lipid panel and hemoglobin A1c  - S/p  mg x 1  - Continue ASA 81 mg daily   - Continue statin therapy   - Telemetry  - Goal normotension, euglycemia, normothermia   - Frequent neuro checks  - PT/OT/speech  - Stroke education  - Medical management and supportive care per primary team, notify with changes    Imaging/Labs:  - MRI brain 05/17/2021:  · Two foci of diffusion restriction in separate vascular territories in the left middle cerebral artery and right posterior cerebral artery territories indicative of acute/recent multifocal small cortical infarctions  · Mild chronic microangiopathy noted elsewhere  · Small right frontal meningioma stable  - Lipid panel 04/20/2021:  Total cholesterol 152, Triglycerides 86, HDL 48, LDL 87  - Hemoglobin A1c 04/20/2021: Elevated 9 1    Benign neoplasm of supratentorial region of brain [facility])  Assessment & Plan  - Known right frontal meningioma  - Follows with outpatient neurosurgery who ordered outpatient MRI brain on 05/17 for 6 month evaluation   - Meningioma appears stable on recent MRI brain w wo     Hypertension, essential  Assessment & Plan  - Goal normotension, medical management per primary team     Fish Siddiqi will need follow up in in 4 weeks with neurovascular attending or advance practitioner  He will require a loop monitor within 1 week of discharge  History of Present Illness     Reason for Consult / Principal Problem: CVA, abnormal finding on MRI brain     HPI: Fish Siddiqi is a 70 y o   male with HTN, HLD, DM type 2, diabetic neuropathy, right frontal meningioma, CAD s/p recent cardiac cath (05/10/2021) on ASA, prior CVA who presents to [place] ED on 05/17/2021 from outpatient MRI after abnormal MRI brain read concerning for acute infarct     Per chart review, patient had a cardiac catheretization on 05/10/2021 performed by Dr Hannah Biggs  Patient was found to have normal left ventricular end-diastolic pressure without significant aortic valvular gradient as well as moderate to severe coronary artery disease involving the distal LAD and mid right coronary artery  Patient had an MRI completed on 05/17/2021 which was ordered by neurosurgery for a 6 month follow up for known right frontal meningioma  MRI brain w wo revealed two foci of diffusion restriction in the left middle cerebral artery and the right posterior cerebral artery  Patient was instructed to come to the ED for further evaluation  Patient states he has the meningioma for years  He believes he had the MRI completed for episodes of dizziness which he reports occurs with changes in his position, specifically bending over and going up stairs   Patient does admit to some blurry vision which he attributes to his diabetes  Patient denies any new stroke symptoms such as focal weakness, focal sensory deficits, visual loss, speech dysfunction  Patient states he take ASA 81 mg daily at bedtime  Patient presented to [facility] ED on 05/17 from outpatient MRI after MRI brain revealed evidence of acute infarct  HR elevated on presentation 52, /84  Today patient states he feels fine and would like to go home  Inpatient consult to Neurology  Consult performed by: Alber Houser PA-C  Consult ordered by: Prosper Crawford DO        Review of Systems  12 point ROS performed, as stated above, all others negative  Historical Information   Past Medical History:   Diagnosis Date    Anxiety     Bradycardia     [facility]erebral infarction ([facility] [address] )     Diabetes mellitus ([facility] [address] )     Diabetic neuropathy (HCC)     Dilated aortic root (HCC)     Essential hypertension     Exercise-induced shortness of breath     Hypertension      Past Surgical History:   Procedure Laterality Date    APPENDECTOMY       Social History   Social History     Substance and Sexual Activity   Alcohol Use Yes    Comment: occasionally     Social History     Substance and Sexual Activity   Drug Use No     E-Cigarette/Vaping    E-Cigarette Use Never User      E-Cigarette/Vaping Substances    Nicotine No     THC No     CBD No     Flavoring No     Other No     Unknown No      Social History     Tobacco Use   Smoking Status Former Smoker   Smokeless Tobacco Never Used     Family History:   Family History   Problem Relation Age of Onset    Ovarian cancer Mother     Cancer Sister        Review of previous medical records was completed  Meds/Allergies   all current active meds have been reviewed, current meds:   No current facility-administered medications for this encounter     , PTA meds:   Prior to Admission Medications   Prescriptions Last Dose Informant Patient Reported? Taking?    D3-50 1 25 MG (83319 UT) capsule  Self Yes No   Sig: Take 50,000 Units by mouth once a week   Jardiance 10 MG TABS  Self Yes No   Sig: Take 10 mg by mouth daily   ONETOUCH DELICA LANCETS 56J MISC  Self Yes No   Sig: use ONE LANCET twice a day   ONETOUCH VERIO test strip  Self Yes No   Sig: TEST twice a day   amLODIPine (NORVASC) 10 mg tablet  Self Yes No   Sig: every morning     aspirin (ASPIR-LOW) 81 mg EC tablet 5/16/2021 at Unknown time Self Yes Yes   Sig: Take 1 tablet by mouth daily   gabapentin (NEURONTIN) 300 mg capsule   No No   Sig: Take 1 capsule (300 mg total) by mouth 2 (two) times a day   glipiZIDE (GLUCOTROL XL) 2 5 mg 24 hr tablet  Self Yes No   Sig: Take 1 tablet by mouth daily   losartan (COZAAR) 100 MG tablet  Self No No   Sig: take 1 tablet by mouth once daily   metoprolol succinate (TOPROL-XL) 50 mg 24 hr tablet  Self No No   Sig: Take 1 tablet (50 mg total) by mouth 2 (two) times a day   pravastatin (PRAVACHOL) 10 mg tablet  Self Yes No   Sig: Take 10 mg by mouth daily   sildenafil (VIAGRA) 50 MG tablet  Self Yes No   Sig: Take 50 mg by mouth daily as needed   sitaGLIPtin (JANUVIA) 100 mg tablet  Self Yes No   Sig: Take 1 tablet by mouth daily      Facility-Administered Medications: None    and     No Known Allergies    Objective   Vitals:Blood pressure 136/84, pulse (!) 51, temperature 98 4 °F (36 9 °C), temperature source Oral, resp  rate 17, weight 99 3 kg (219 lb), SpO2 98 %  ,Body mass index is 30 54 kg/m²  No intake or output data in the 24 hours ending 05/17/21 1519    Invasive Devices: Invasive Devices     None               Physical Exam  Vitals signs and nursing note reviewed  Constitutional:       General: He is not in acute distress  Appearance: Normal appearance  He is normal weight  He is not ill-appearing, toxic-appearing or diaphoretic  HENT:      Head: Normocephalic and atraumatic  Eyes:      General: No scleral icterus  Right eye: No discharge  Left eye: No discharge        Extraocular Movements: Extraocular movements intact and EOM normal       Conjunctiva/sclera: Conjunctivae normal    Neck:      Musculoskeletal: Normal range of motion and neck supple  Musculoskeletal: Normal range of motion  Skin:     General: Skin is warm and dry  Coloration: Skin is not jaundiced or pale  Findings: No bruising, erythema, lesion or rash  Neurological:      Mental Status: He is alert  Coordination: Finger-Nose-Finger Test and Heel to [place] angel Test normal       Gait: Gait is intact  Psychiatric:         Mood and Affect: Mood normal          Behavior: Behavior normal          Thought Content: Thought content normal          Judgment: Judgment normal        Neurologic Exam     Mental Status   Patient is alert, lying in bed  Oriented x 3  No dysarthria or aphasia noted  Able to name all objects provided, including high and low frequency words  Follows central and appendicular commands  Answers all questions appropriately      Cranial Nerves     CN II   Visual fields full to confrontation  CN III, IV, VI   Extraocular motions are normal    Nystagmus: none   Upgaze: normal  Downgaze: normal  Conjugate gaze: present    CN V   Facial sensation intact  CN VIII   Hearing: intact    CN XI   CN XI normal      CN XII   Tongue deviation: none  Right nasolabial fold slightly decreased on right, patient notes to have less teeth on the right; otherwise no other facial asymmetry noted       Motor Exam   Muscle bulk: normal  Overall muscle tone: normal   strength 5/5 bilaterally   Bilateral upper and lower extremity strength testing 5/5 throughout      Sensory Exam   Light touch normal    Pinprick normal    No evidence of neglect or extinction on exam      Gait, Coordination, and Reflexes     Gait  Gait: normal    Coordination   Finger to nose coordination: normal  Heel to shin coordination: normal    Tremor   Resting tremor: absent    Reflexes   Reflexes 2+ except as noted     Right plantar: normal  Left plantar: normal  No ataxia or dysmetria noted in BUE finger to nose testing   No involuntary movements noted throughout exam      Lab Results: I have personally reviewed pertinent reports    Recent Results (from the past 24 hour(s))   CBC and differential    Collection Time: 05/17/21 12:13 PM   Result Value Ref Range    WBC 3 39 (L) 4 31 - 10 16 Thousand/uL    RBC 5 54 3 88 - 5 62 Million/uL    Hemoglobin 14 9 12 0 - 17 0 g/dL    Hematocrit 46 5 36 5 - 49 3 %    MCV 84 82 - 98 fL    MCH 26 9 26 8 - 34 3 pg    MCHC 32 0 31 4 - 37 4 g/dL    RDW 13 9 11 6 - 15 1 %    MPV 10 0 8 9 - 12 7 fL    Platelets 089 (L) 941 - 390 Thousands/uL    nRBC 0 /100 WBCs    Neutrophils Relative 47 43 - 75 %    Immat GRANS % 1 0 - 2 %    Lymphocytes Relative 41 14 - 44 %    Monocytes Relative 10 4 - 12 %    Eosinophils Relative 1 0 - 6 %    Basophils Relative 0 0 - 1 %    Neutrophils Absolute 1 60 (L) 1 85 - 7 62 Thousands/µL    Immature Grans Absolute 0 02 0 00 - 0 20 Thousand/uL    Lymphocytes Absolute 1 40 0 60 - 4 47 Thousands/µL    Monocytes Absolute 0 34 0 17 - 1 22 Thousand/µL    Eosinophils Absolute 0 02 0 00 - 0 61 Thousand/µL    Basophils Absolute 0 01 0 00 - 0 10 Thousands/µL   Protime-INR    Collection Time: 05/17/21 12:13 PM   Result Value Ref Range    Protime 13 7 11 6 - 14 5 seconds    INR 1 11 0 84 - 1 19   APTT    Collection Time: 05/17/21 12:13 PM   Result Value Ref Range    PTT 26 23 - 37 seconds   Comprehensive metabolic panel    Collection Time: 05/17/21 12:13 PM   Result Value Ref Range    Sodium 142 136 - 145 mmol/L    Potassium 4 2 3 5 - 5 3 mmol/L    Chloride 106 100 - 108 mmol/L    CO2 27 21 - 32 mmol/L    ANION GAP 9 4 - 13 mmol/L    BUN 15 5 - 25 mg/dL    Creatinine 1 24 0 60 - 1 30 mg/dL    Glucose 109 65 - 140 mg/dL    Calcium 9 0 8 3 - 10 1 mg/dL    Corrected Calcium 9 5 8 3 - 10 1 mg/dL    AST 16 5 - 45 U/L    ALT 26 12 - 78 U/L    Alkaline Phosphatase 84 46 - 116 U/L    Total Protein 7 1 6 4 - 8 2 g/dL    Albumin 3 4 (L) 3 5 - 5 0 g/dL Total Bilirubin 0 40 0 20 - 1 00 mg/dL    eGFR 66 ml/min/1 73sq m   Troponin I    Collection Time: 05/17/21 12:13 PM   Result Value Ref Range    Troponin I 0 02 <=0 04 ng/mL   ]  Imaging Studies: I have personally reviewed pertinent reports and I have personally reviewed pertinent films in PACS  EKG, Pathology, and Other Studies: I have personally reviewed pertinent reports      VTE Prophylaxis: Patient in the ED, will be placed on DVT prophylaxis once admitted to the floor

## 2021-05-17 NOTE — ED NOTES
Neurology provider at bedside       Joby Benson RN  05/17/21 7458
Pt ate ham sandwich and juice without issue  Steady gait to bathroom       Jocelyn Rodriguez RN  05/17/21 3209
Pt denies symptoms as this time  Pt states he is very anxious about what will happen       Jamarcus Sutherland, ZEINAB  05/17/21 1859
14-Aug-2019

## 2021-05-17 NOTE — DISCHARGE INSTRUCTIONS
Ischemic Stroke   WHAT YOU NEED TO KNOW:   An ischemic stroke occurs when blood flow to part of your brain is blocked  The block is usually caused by a blood clot that gets stuck in a narrow blood vessel  When oxygen cannot get to an area of the brain, tissue in that area may get damaged  The damage can cause loss of body functions controlled by that area of the brain  A stroke is a medical emergency that needs immediate treatment  Most medicines and treatments work best the sooner they are given  DISCHARGE INSTRUCTIONS:   Call your local emergency number (911 in the [address] [facility]) or have someone else call if:   · You have any of the following signs of a stroke:      ? Numbness or drooping on one side of your face     ? Weakness in an arm or leg    ? Confusion or difficulty speaking    ? Dizziness, a severe headache, or vision loss       · You have a seizure  · You have chest pain or shortness of breath  Seek care immediately if:   · Your arm or leg feels warm, tender, and painful  It may look swollen and red  · You have loss of balance or coordination  · You have double vision or vision loss  · You have unusual or heavy bleeding  Call your doctor if:   · Your blood pressure is higher or lower than you were told it should be  · You have questions or concerns about your condition or care  Warning signs of a stroke: The words BE FAST can help you remember and recognize warning signs of a stroke:  · B = Balance:  Sudden loss of balance    · E = Eyes:  Loss of vision in one or both eyes    · F = Face:  Face droops on one side    · A = Arms:  Arm drops when both arms are raised    · S = Speech:  Speech is slurred or sounds different    · T = Time:  Time to get help immediately     Medicines: You may  need any of the following:  · Antiplatelets , such as aspirin, help prevent blood clots  Take your antiplatelet medicine exactly as directed   These medicines make it more likely for you to bleed or bruise  If you are told to take aspirin, do not take acetaminophen or ibuprofen instead  · Blood thinners  help prevent blood clots  Clots can cause strokes, heart attacks, and death  The following are general safety guidelines to follow while you are taking a blood thinner:    ? Watch for bleeding and bruising while you take blood thinners  Watch for bleeding from your gums or nose  Watch for blood in your urine and bowel movements  Use a soft washcloth on your skin, and a soft toothbrush to brush your teeth  This can keep your skin and gums from bleeding  If you shave, use an electric shaver  Do not play contact sports  ? Tell your dentist and other healthcare providers that you take a blood thinner  Wear a bracelet or necklace that says you take this medicine  ? Do not start or stop any other medicines unless your healthcare provider tells you to  Many medicines cannot be used with blood thinners  ? Take your blood thinner exactly as prescribed by your healthcare provider  Do not skip does or take less than prescribed  Tell your provider right away if you forget to take your blood thinner, or if you take too much  ? Warfarin  is a blood thinner that you may need to take  The following are things you should be aware of if you take warfarin:     § Foods and medicines can affect the amount of warfarin in your blood  Do not make major changes to your diet while you take warfarin  Warfarin works best when you eat about the same amount of vitamin K every day  Vitamin K is found in green leafy vegetables and certain other foods  Ask for more information about what to eat when you are taking warfarin  § You will need to see your healthcare provider for follow-up visits when you are on warfarin  You will need regular blood tests  These tests are used to decide how much medicine you need  · Medicines  may be given to treat health conditions that increase the risk for a stroke   Examples are high cholesterol, high blood pressure, and diabetes  · Take your medicine as directed  Contact your healthcare provider if you think your medicine is not helping or if you have side effects  Tell him or her if you are allergic to any medicine  Keep a list of the medicines, vitamins, and herbs you take  Include the amounts, and when and why you take them  Bring the list or the pill bottles to follow-up visits  Carry your medicine list with you in case of an emergency  Recovery testing: Your healthcare provider will test your recovery 90 days (3 months) after your stroke  This may be done over the phone or in person  Your provider will ask how well you can do the activities you did before the stroke  He or she will also ask how well you can do your daily activities without help  Your provider may make recommendations for you based on your test  For example, you may need someone to help you walk safely  You may also need help with daily activities, such as getting dressed  Based on your answers, your provider may do this test again over time  Manage an ischemic stroke:   · Go to stroke rehabilitation (rehab) if directed  Rehab is a program run by specialists who will help you recover abilities you may have lost  Specialists include physical, occupational, and speech therapists  Physical therapists help you gain strength or keep your balance  Occupational therapists teach you new ways to do daily activities, such as getting dressed  Your therapy may include movements for everyday activities  An example is being able to raise yourself from a chair  A speech therapist helps you improve your ability to talk and swallow  · Wear pressure stockings as directed  Pressure stockings help keep blood from pooling in your leg veins  Your healthcare provider can prescribe stockings that are right for you  Do not buy over-the-counter pressure stockings unless your healthcare provider says it is okay   They may not fit correctly or may have elastic that cuts off your circulation  Ask your healthcare provider when to start wearing pressure stockings and how long to wear them each day  · Make your home safe  Remove anything you might trip over  Tape electrical cords down  Keep paths clear throughout your home  Make sure your home is well lit  Put nonslip materials on surfaces that might be slippery  An example is your bathtub or shower floor  A cane or walker may help you keep your balance as you walk  What you can do to prevent another stroke:   · Manage health conditions  A condition such as diabetes can increase your risk for a stroke  Control your blood sugar level if you have hyperglycemia or diabetes  Take your prescribed medicines and check your blood sugar level as directed  · Check your blood pressure as directed  High blood pressure can increase your risk for a stroke  If you have high blood pressure, follow your healthcare provider's directions for controlling it  · Do not use nicotine products or illegal drugs  Nicotine and other chemicals in cigarettes and cigars can cause blood vessel damage  Nicotine and illegal drugs both increase your risk for a stroke  Ask your healthcare provider for information if you currently smoke or use drugs and need help to quit  E- cigarettes or smokeless tobacco still contain nicotine  Talk to your healthcare provider before you use these products  · Talk to your healthcare provider about alcohol  Alcohol can raise your blood pressure  The recommended limit is 2 drinks in a day for men and 1 drink in a day for women  Do not binge drink or save a week's worth of alcohol to drink in 1 or 2 days  Limit weekly amounts as directed by your provider  · Eat a variety of healthy foods  Healthy foods include whole-grain breads, low-fat dairy products, beans, lean meats, and fish  Eat at least 5 servings of fruits and vegetables each day   Choose foods that are low in fat, cholesterol, salt, and sugar  Eat foods that are high in potassium, such as potatoes and bananas  A dietitian can help you create healthy meal plans  · Maintain a healthy weight  Ask your healthcare provider how much you should weigh  Ask him or her to help you create a weight loss plan if you are overweight  He or she can help you create small goals if you have a lot of weight to lose  · Exercise as directed  Exercise can lower your blood pressure, cholesterol, weight, and blood sugar levels  Healthcare providers will help you create exercise goals  They can also help you make a plan to reach your goals  For example, you can break exercise into 10 minute periods, 3 times in the day  Find an exercise that you enjoy  This will make it easier for you to reach your exercise goals  · Manage stress  Stress can raise your blood pressure  Find new ways to relax, such as deep breathing or listening to music  · Talk to your healthcare provider about risk factors for women  Birth control pills increase your risk, especially if you are older than 35 or smoke cigarettes  Talk to your healthcare provider about other forms of contraception  Estrogen levels drop during menopause  Low estrogen levels may increase your risk for stroke  Talk to your healthcare provider about hormone replacement therapy to reduce your risk for a stroke  What you need to know about depression after a stroke:  Talk to your healthcare provider if you have depression that continues or is getting worse  Your provider may be able to help treat your depression  Your provider can also recommend support groups for you to join  A support group is a place to talk with others who have had a stroke  It may also help to talk to friends and family members about how you are feeling   Tell your family and friends to let your healthcare provider know if they see any signs of depression:  · Extreme sadness    · Avoiding social interaction with family or friends    · A lack of interest in things you once enjoyed    · Irritability    · Trouble sleeping    · Low energy levels    · A change in eating habits or sudden weight gain or loss    Follow up with your doctor or neurologist as directed: You may need to come in over time for brain function or blood tests  Your provider may refer you to a specialist, or to other kinds of care  An example is palliative (comfort) care  Your provider can also give information about respite care to anyone who helps care for you  Respite care is a service to help caregivers take a break or get more rest  Write down your questions so you remember to ask them during your visits  For support and more information:   · National St[employer] Association  [address] , [place] [address] [address]  Phone: 9- 758 - 906-4199  Web Address: [employer] [address]  [address] Information is for End User's use only and may not be sold, redistributed or otherwise used for commercial purposes  All illustrations and images included in CareNotes® are the copyrighted property of A D A M , Inc  or [facility] Amrit Rader   The above information is an  only  It is not intended as medical advice for individual conditions or treatments  Talk to your doctor, nurse or pharmacist before following any medical regimen to see if it is safe and effective for you

## 2021-05-18 LAB
ATRIAL RATE: 50 BPM
P AXIS: 49 DEGREES
PR INTERVAL: 220 MS
QRS AXIS: 3 DEGREES
QRSD INTERVAL: 82 MS
QT INTERVAL: 460 MS
QTC INTERVAL: 419 MS
T WAVE AXIS: -23 DEGREES
VENTRICULAR RATE: 50 BPM

## 2021-05-18 PROCEDURE — 93010 ELECTROCARDIOGRAM REPORT: CPT | Performed by: INTERNAL MEDICINE

## 2021-05-24 ENCOUNTER — OFFICE VISIT (OUTPATIENT)
Dept: CARDIOLOGY CLINIC | Facility: CLINIC | Age: 76
End: 2021-05-24
Payer: COMMERCIAL

## 2021-05-24 ENCOUNTER — OFFICE VISIT (OUTPATIENT)
Dept: NEUROSURGERY | Facility: CLINIC | Age: 76
End: 2021-05-24
Payer: COMMERCIAL

## 2021-05-24 VITALS
SYSTOLIC BLOOD PRESSURE: 138 MMHG | DIASTOLIC BLOOD PRESSURE: 94 MMHG | HEART RATE: 50 BPM | RESPIRATION RATE: 16 BRPM | BODY MASS INDEX: 30.8 KG/M2 | HEIGHT: 71 IN | WEIGHT: 220 LBS | TEMPERATURE: 97.2 F

## 2021-05-24 VITALS
HEART RATE: 54 BPM | WEIGHT: 220 LBS | HEIGHT: 71 IN | DIASTOLIC BLOOD PRESSURE: 86 MMHG | SYSTOLIC BLOOD PRESSURE: 140 MMHG | BODY MASS INDEX: 30.8 KG/M2 | OXYGEN SATURATION: 96 %

## 2021-05-24 DIAGNOSIS — I71.2 THORACIC AORTIC ANEURYSM WITHOUT RUPTURE (HCC): ICD-10-CM

## 2021-05-24 DIAGNOSIS — E78.2 HYPERLIPEMIA, MIXED: ICD-10-CM

## 2021-05-24 DIAGNOSIS — I10 HYPERTENSION, ESSENTIAL: Primary | ICD-10-CM

## 2021-05-24 DIAGNOSIS — I63.439 CEREBROVASCULAR ACCIDENT (CVA) DUE TO EMBOLISM OF POSTERIOR CEREBRAL ARTERY, UNSPECIFIED BLOOD VESSEL LATERALITY (HCC): ICD-10-CM

## 2021-05-24 DIAGNOSIS — D33.0 BENIGN NEOPLASM OF SUPRATENTORIAL REGION OF BRAIN (HCC): Primary | ICD-10-CM

## 2021-05-24 PROBLEM — D32.9 MENINGIOMA (HCC): Status: ACTIVE | Noted: 2021-05-24

## 2021-05-24 PROBLEM — D32.9 MENINGIOMA (HCC): Status: RESOLVED | Noted: 2021-05-24 | Resolved: 2021-05-24

## 2021-05-24 PROCEDURE — 99213 OFFICE O/P EST LOW 20 MIN: CPT | Performed by: NEUROLOGICAL SURGERY

## 2021-05-24 PROCEDURE — 99214 OFFICE O/P EST MOD 30 MIN: CPT | Performed by: INTERNAL MEDICINE

## 2021-05-24 NOTE — ASSESSMENT & PLAN NOTE
Patient presents for 6 months follow-up of 1 2 cm right frontal meningioma  · Per report, stable from an MRI brain wo in 2015 while being worked up for dizziness  · Ongoing dizziness with standing, climbing stairs and movement  Imaging:  · MRI brain w/wo 5/17/21:  Dural-based extra-axial enhancing mass flatter to the right frontal lobe measuring 1 3 cm  Two foci of diffusion restriction left recent multifocal small cortical infarcts  Plan:  · Continue to monitor neurological symptoms  · Reviewed imaging with patient and daughter  · Reviewed the natural history meningiomas and options for treatment of meningiomas with patient such as radiation, surgical resection, or surveillance with serial follow-up and regular MRIs  · We discussed NCCN guidelines for meningioma follow-up including surveillance scans from time of diagnosis to include three months, six months, 12 months, annual for five years and every 1-3 years as indicated thereafter  · Patient will follow-up in 12 months with MRI brain for surveillance of right frontal meningioma  · Regarding recent strokes, felt to be related to cardiac catheterization  · Discussed ongoing follow-up with Neurology and Cardiology  · Discussed importance of monitoring blood pressure and cholesterol  · Regarding dizziness, recommend ongoing follow-up with Neurology  · Patient was noted to be slightly orthostatic per Neurology note and I encouraged increase in fluid intake  · In addition, we discussed possible consideration for diagnosis of vertigo and may consider therapy for such  · Regarding memory difficulties, recommend ongoing follow-up with Neurology  · Patient advised if he develops worsening visual changes, worsening headaches, seizures, weakness in arms or legs, etc    · Reviewed recommendations with patient  Patient showed understanding and is agreeable to recommendations    · Patient aware to contact Neurosurgery with any further questions or concerns

## 2021-05-24 NOTE — LETTER
May 24, 2021     Leny Mena MD  [address] [facility] [address]    Patient: Marques Barreto   YOB: 1945   Date of Visit: 5/24/2021       Dear Dr Cali Lara:    Thank you for referring Tayler Renee to me for evaluation  Below are my notes for this consultation  If you have questions, please do not hesitate to call me  I look forward to following your patient along with you  Sincerely,        Cornelio Connolly PA-C        CC: No Recipients  Laura Amezcua  5/24/2021  2:27 PM  Incomplete  Neurosurgery Office Note  Marques Barreto 70 y o  male MRN: [id number]      Assessment/Plan     Benign neoplasm of supratentorial region of brain [facility])  Patient presents for 6 months follow-up of 1 2 cm right frontal meningioma  · Per report, stable from an MRI brain wo in 2015 while being worked up for dizziness  · Ongoing dizziness with standing, climbing stairs and movement  Imaging:  · MRI brain w/wo 5/17/21:  Dural-based extra-axial enhancing mass flatter to the right frontal lobe measuring 1 3 cm  Two foci of diffusion restriction left recent multifocal small cortical infarcts  Plan:  · Continue to monitor neurological symptoms  · Reviewed imaging with patient and daughter  · Reviewed the natural history meningiomas and options for treatment of meningiomas with patient such as radiation, surgical resection, or surveillance with serial follow-up and regular MRIs  · We discussed NCCN guidelines for meningioma follow-up including surveillance scans from time of diagnosis to include three months, six months, 12 months, annual for five years and every 1-3 years as indicated thereafter  · Patient will follow-up in 12 months with MRI brain for surveillance of right frontal meningioma  · Regarding recent strokes, felt to be related to cardiac catheterization  · Discussed ongoing follow-up with Neurology and Cardiology      · Discussed importance of monitoring blood pressure and cholesterol  · Regarding dizziness, recommend ongoing follow-up with Neurology  · Patient was noted to be slightly orthostatic per Neurology note and I encouraged increase in fluid intake  · In addition, we discussed possible consideration for diagnosis of vertigo and may consider therapy for such  · Regarding memory difficulties, recommend ongoing follow-up with Neurology  · Patient advised if he develops worsening visual changes, worsening headaches, seizures, weakness in arms or legs, etc    · Reviewed recommendations with patient  Patient showed understanding and is agreeable to recommendations  · Patient aware to contact Neurosurgery with any further questions or concerns  Diagnoses and all orders for this visit:    Benign neoplasm of supratentorial region of brain [facility])  -     MRI brain w wo contrast; Future            CHIEF COMPLAINT    Chief Complaint   Patient presents with    Follow-up     Shared GB/HDM; 6 months with MRI brain 5/17/21 for surveillance of right frontal meningioma       HISTORY    History of Present Illness     This is a 74-year-old male with past medical history significant for diabetes with peripheral for all neuropathy, hypertension, thoracic aortic aneurysm, and stroke who presents with follow-up for right frontal meningioma  Patient had MRIs completed prior dating back to 2015 for complaints of headaches and dizziness  More recently patient was seen in the neurology outpatient office August 2020 incomplete an MRI brain with and without contrast   Imaging demonstrated a right frontal enhancing extra axial lesion most consistent with a meningioma  Per report this was retrospectively stable from noncontrast imaging 2015  Patient is scheduled to be seen today with surveillance MRI  Patient does admit to intermittent headaches which resolved without intervention    He admits to dizziness with positional changes, climbing stairs and sometimes with small head movements  He denies any visual changes  No weakness or sensation changes  No difficulty with ambulation  Remainder of ROS negative  Of note, patient's wife is  from a glioblastoma multiform  She  shortly after diagnosis  Given this history, patient does have concerns regarding finding of his brain mass  REVIEW OF SYSTEMS    Review of Systems   Constitutional: Negative  HENT: Negative  Eyes: Negative  Respiratory: Negative  Cardiovascular: Negative  Gastrointestinal: Negative  Endocrine: Negative  Genitourinary: Negative  Musculoskeletal: Positive for back pain  Skin: Negative  Allergic/Immunologic: Negative  Neurological: Positive for dizziness  Memory difficulties   Hematological: Negative  Psychiatric/Behavioral: Positive for decreased concentration (severe forgetfulness)  All other systems reviewed and are negative  ROS personally reviewed       Meds/Allergies     Current Outpatient Medications   Medication Sig Dispense Refill    amLODIPine (NORVASC) 10 mg tablet every morning        aspirin (ASPIR-LOW) 81 mg EC tablet Take 1 tablet by mouth daily      D3-50 1 25 MG (74090 UT) capsule Take 50,000 Units by mouth once a week      gabapentin (NEURONTIN) 300 mg capsule Take 1 capsule (300 mg total) by mouth 2 (two) times a day 60 capsule 0    glipiZIDE (GLUCOTROL XL) 2 5 mg 24 hr tablet Take 1 tablet by mouth daily      Jardiance 10 MG TABS Take 10 mg by mouth daily      losartan (COZAAR) 100 MG tablet take 1 tablet by mouth once daily 90 tablet 3    metoprolol succinate (TOPROL-XL) 50 mg 24 hr tablet Take 1 tablet (50 mg total) by mouth 2 (two) times a day 180 tablet 2    ONETOUCH DELICA LANCETS 85F MISC use ONE LANCET twice a day  0    ONETOUCH VERIO test strip TEST twice a day  0    pravastatin (PRAVACHOL) 10 mg tablet Take 10 mg by mouth daily      sildenafil (VIAGRA) 50 MG tablet Take 50 mg by mouth daily as needed      sitaGLIPtin (JANUVIA) 100 mg tablet Take 1 tablet by mouth daily       No current facility-administered medications for this visit  No Known Allergies    PAST HISTORY    Past Medical History:   Diagnosis Date    Anxiety     Bradycardia     [facility]     Diabetes mellitus ([facility] )     Diabetic neuropathy (HCC)     Dilated aortic root (HCC)     Essential hypertension     Exercise-induced shortness of breath     Hypertension        Past Surgical History:   Procedure Laterality Date    APPENDECTOMY         Social History     Tobacco Use    Smoking status: Former Smoker    Smokeless tobacco: Never Used   Substance Use Topics    Alcohol use: Yes     Comment: occasionally    Drug use: No       Family History   Problem Relation Age of Onset    Ovarian cancer Mother     Cancer Sister          Above history personally reviewed  EXAM    Vitals:Blood pressure 138/94, pulse (!) 50, temperature (!) 97 2 °F (36 2 °C), temperature source Probe, resp  rate 16, height 5' 11" (1 803 m), weight 99 8 kg (220 lb)  ,Body mass index is 30 68 kg/m²  Physical Exam  Constitutional:       General: He is not in acute distress  Appearance: Normal appearance  He is well-developed  He is not ill-appearing  HENT:      Head: Normocephalic and atraumatic  Nose: Nose normal       Mouth/Throat:      Mouth: Mucous membranes are dry  Eyes:      General: No scleral icterus  Right eye: No discharge  Left eye: No discharge  Extraocular Movements: Extraocular movements intact and EOM normal       Conjunctiva/sclera: Conjunctivae normal       Pupils: Pupils are equal, round, and reactive to light  Neck:      Musculoskeletal: Neck supple  Cardiovascular:      Rate and Rhythm: Normal rate  Pulmonary:      Effort: Pulmonary effort is normal  No respiratory distress  Abdominal:      General: There is no distension  Skin:     General: Skin is warm and dry  Neurological:      Mental Status: He is alert  Coordination: Finger-Nose-Finger Test normal       Deep Tendon Reflexes: Strength normal    Psychiatric:         Mood and Affect: Mood normal          Speech: Speech normal          Behavior: Behavior normal          Thought Content: Thought content normal          Judgment: Judgment normal          Neurologic Exam     Mental Status   Follows 3 step commands  Attention: normal  Concentration: normal    Speech: speech is normal   Level of consciousness: alert  Knowledge: good  Able to perform simple calculations  Normal comprehension  Cranial Nerves     CN III, IV, VI   Pupils are equal, round, and reactive to light  Extraocular motions are normal    Nystagmus: none   Upgaze: normal  Conjugate gaze: present    CN V   Facial sensation intact  CN VII   Facial expression full, symmetric  CN VIII   Hearing: intact (finger rub)    CN XI   Right trapezius strength: normal  Left trapezius strength: normal    CN XII   Tongue: not atrophic  Fasciculations: absent  Tongue deviation: none    Motor Exam   Muscle bulk: normal  Overall muscle tone: normal  Right arm pronator drift: present (minimal )  Left arm pronator drift: absent    Strength   Strength 5/5 throughout  Sensory Exam   Light touch normal      Gait, Coordination, and Reflexes     Coordination   Finger to nose coordination: normal    Tremor   Resting tremor: absent  Intention tremor: absent  Action tremor: absent    Reflexes   Right Carroll: absent  Left Carroll: absent  Right ankle clonus: absent  Left ankle clonus: absent        MEDICAL DECISION MAKING    Imaging Studies:     Mri Brain With And Without Contrast    Result Date: 5/17/2021  Narrative: MRI BRAIN WITH AND WITHOUT CONTRAST INDICATION: D33 0: Benign neoplasm of brain, supratentorial  COMPARISON:  11/6/2020 TECHNIQUE: Sagittal T1, axial T2, axial FLAIR, axial T1, axial [place], axial diffusion   Sagittal, axial T1 postcontrast   Axial bravo postcontrast with coronal reconstructions  IV Contrast:  9 mL of Gadobutrol injection (SINGLE-DOSE)  IMAGE QUALITY:   Diagnostic  FINDINGS: BRAIN PARENCHYMA:  In the parasagittal aspect of the right occipital lobe on image 17, series 3 there is a small juxtacortical focus of diffusion restriction in the right posterior cerebral artery territory     Separately in the left middle cerebral artery territory, there is a small curvilinear band of cortical diffusion restriction in the left frontal lobe image 24, series 3  Small scattered hyperintensities on T2/FLAIR imaging are noted in the periventricular and subcortical white matter demonstrating an appearance that is statistically most likely to represent mild microangiopathic change  Again demonstrated is a dural based extra-axial enhancing mass lateral to the right frontal lobe on image 21, series 10, measuring 1 3 cm  This demonstrates associated diffusion restriction as well, likely related to hypercellularity, stable in appearance from the prior study  No associated edema or mass effect  No new or additional enhancing abnormalities  VENTRICLES:  Normal for the patient's age  SELLA AND PITUITARY GLAND:  Normal  ORBITS:  Normal  PARANASAL SINUSES:  Small mucous retention cyst in the right maxillary sinus  VASCULATURE:  Evaluation of the major intracranial vasculature demonstrates appropriate flow voids  CALVARIUM AND SKULL BASE:  Normal  EXTRACRANIAL SOFT TISSUES:  Normal      Impression: 1  Two foci of diffusion restriction in separate vascular territories in the left middle cerebral artery and right posterior cerebral artery territories indicative of acute/recent multifocal small cortical infarctions  A central embolic source such as atrial fibrillation should be excluded  Further clinical assessment and workup advised  2   Mild, chronic microangiopathy elsewhere  3   Small right frontal meningioma is stable    I personally discussed this study with Baron Benitez, the neurosurgical nurse navigator, who works with Amy Zambrano on 5/17/2021 at 10:26 AM  It was decided to escort the patient to the emergency department for further assessment/workup of these acute infarctions  Workstation performed: [id number]       I have personally reviewed pertinent reports     and I have personally reviewed pertinent films in PACS

## 2021-05-24 NOTE — PROGRESS NOTES
Neurosurgery Office Note  Gracie Cardoza 70 y o  male MRN: [id number]      Assessment/Plan     Benign neoplasm of supratentorial region of brain [facility])  Patient presents for 6 months follow-up of 1 2 cm right frontal meningioma  · Per report, stable from an MRI brain wo in 2015 while being worked up for dizziness  · Ongoing dizziness with standing, climbing stairs and movement  Imaging:  · MRI brain w/wo 5/17/21:  Dural-based extra-axial enhancing mass flatter to the right frontal lobe measuring 1 3 cm  Two foci of diffusion restriction left recent multifocal small cortical infarcts  Plan:  · Continue to monitor neurological symptoms  · Reviewed imaging with patient and daughter  · Reviewed the natural history meningiomas and options for treatment of meningiomas with patient such as radiation, surgical resection, or surveillance with serial follow-up and regular MRIs  · We discussed NCCN guidelines for meningioma follow-up including surveillance scans from time of diagnosis to include three months, six months, 12 months, annual for five years and every 1-3 years as indicated thereafter  · Patient will follow-up in 12 months with MRI brain for surveillance of right frontal meningioma  · Regarding recent strokes, felt to be related to cardiac catheterization  · Discussed ongoing follow-up with Neurology and Cardiology  · Discussed importance of monitoring blood pressure and cholesterol  · Regarding dizziness, recommend ongoing follow-up with Neurology  · Patient was noted to be slightly orthostatic per Neurology note and I encouraged increase in fluid intake  · In addition, we discussed possible consideration for diagnosis of vertigo and may consider therapy for such  · Regarding memory difficulties, recommend ongoing follow-up with Neurology    · Patient advised if he develops worsening visual changes, worsening headaches, seizures, weakness in arms or legs, etc    · Reviewed recommendations with patient  Patient showed understanding and is agreeable to recommendations  · Patient aware to contact Neurosurgery with any further questions or concerns  Diagnoses and all orders for this visit:    Benign neoplasm of supratentorial region of brain [facility])  -     MRI brain w wo contrast; Future            CHIEF COMPLAINT    Chief Complaint   Patient presents with    Follow-up     Shared GB/HDM; 6 months with MRI brain 21 for surveillance of right frontal meningioma       HISTORY    History of Present Illness     This is a 79-year-old male with past medical history significant for diabetes with peripheral for all neuropathy, hypertension, thoracic aortic aneurysm, and stroke who presents with follow-up for right frontal meningioma  Patient had MRIs completed prior dating back to  for complaints of headaches and dizziness  More recently patient was seen in the neurology outpatient office 2020 incomplete an MRI brain with and without contrast   Imaging demonstrated a right frontal enhancing extra axial lesion most consistent with a meningioma  Per report this was retrospectively stable from noncontrast imaging   Patient is scheduled to be seen today with surveillance MRI  Patient does admit to intermittent headaches which resolved without intervention  He admits to dizziness with positional changes, climbing stairs and sometimes with small head movements  He denies any visual changes  No weakness or sensation changes  No difficulty with ambulation  Remainder of ROS negative  Of note, patient's wife is  from a glioblastoma multiform  She  shortly after diagnosis  Given this history, patient does have concerns regarding finding of his brain mass  REVIEW OF SYSTEMS    Review of Systems   Constitutional: Negative  HENT: Negative  Eyes: Negative  Respiratory: Negative  Cardiovascular: Negative      Gastrointestinal: Negative  Endocrine: Negative  Genitourinary: Negative  Musculoskeletal: Positive for back pain  Skin: Negative  Allergic/Immunologic: Negative  Neurological: Positive for dizziness  Memory difficulties   Hematological: Negative  Psychiatric/Behavioral: Positive for decreased concentration (severe forgetfulness)  All other systems reviewed and are negative  ROS personally reviewed  Meds/Allergies     Current Outpatient Medications   Medication Sig Dispense Refill    amLODIPine (NORVASC) 10 mg tablet every morning        aspirin (ASPIR-LOW) 81 mg EC tablet Take 1 tablet by mouth daily      D3-50 1 25 MG (47424 UT) capsule Take 50,000 Units by mouth once a week      gabapentin (NEURONTIN) 300 mg capsule Take 1 capsule (300 mg total) by mouth 2 (two) times a day 60 capsule 0    glipiZIDE (GLUCOTROL XL) 2 5 mg 24 hr tablet Take 1 tablet by mouth daily      Jardiance 10 MG TABS Take 10 mg by mouth daily      losartan (COZAAR) 100 MG tablet take 1 tablet by mouth once daily 90 tablet 3    metoprolol succinate (TOPROL-XL) 50 mg 24 hr tablet Take 1 tablet (50 mg total) by mouth 2 (two) times a day 180 tablet 2    ONETOUCH DELICA LANCETS 38R MISC use ONE LANCET twice a day  0    ONETOUCH VERIO test strip TEST twice a day  0    pravastatin (PRAVACHOL) 10 mg tablet Take 10 mg by mouth daily      sildenafil (VIAGRA) 50 MG tablet Take 50 mg by mouth daily as needed      sitaGLIPtin (JANUVIA) 100 mg tablet Take 1 tablet by mouth daily       No current facility-administered medications for this visit          No Known Allergies    PAST HISTORY    Past Medical History:   Diagnosis Date    Anxiety     Bradycardia     Cerebral infarction ([address] )     Diabetes mellitus ([facility] )     Diabetic neuropathy (HCC)     Dilated aortic root (HCC)     Essential hypertension     Exercise-induced shortness of breath     Hypertension        Past Surgical History:   Procedure Laterality Date    APPENDECTOMY         Social History     Tobacco Use    Smoking status: Former Smoker    Smokeless tobacco: Never Used   Substance Use Topics    Alcohol use: Yes     Comment: occasionally    Drug use: No       Family History   Problem Relation Age of Onset    Ovarian cancer Mother     Cancer Sister          Above history personally reviewed  EXAM    Vitals:Blood pressure 138/94, pulse (!) 50, temperature (!) 97 2 °F (36 2 °C), temperature source Probe, resp  rate 16, height 5' 11" (1 803 m), weight 99 8 kg (220 lb)  ,Body mass index is 30 68 kg/m²  Physical Exam  Constitutional:       General: He is not in acute distress  Appearance: Normal appearance  He is well-developed  He is not ill-appearing  HENT:      Head: Normocephalic and atraumatic  Nose: Nose normal       Mouth/Throat:      Mouth: Mucous membranes are dry  Eyes:      General: No scleral icterus  Right eye: No discharge  Left eye: No discharge  Extraocular Movements: Extraocular movements intact and EOM normal       Conjunctiva/sclera: Conjunctivae normal       Pupils: Pupils are equal, round, and reactive to light  Neck:      Musculoskeletal: Neck supple  Cardiovascular:      Rate and Rhythm: Normal rate  Pulmonary:      Effort: Pulmonary effort is normal  No respiratory distress  Abdominal:      General: There is no distension  Skin:     General: Skin is warm and dry  Neurological:      Mental Status: He is alert  Coordination: Finger-Nose-Finger Test normal       Deep Tendon Reflexes: Strength normal    Psychiatric:         Mood and Affect: Mood normal          Speech: Speech normal          Behavior: Behavior normal          Thought Content: Thought content normal          Judgment: Judgment normal          Neurologic Exam     Mental Status   Follows 3 step commands  Attention: normal  Concentration: normal    Speech: speech is normal   Level of consciousness: alert  Knowledge: good  Able to perform simple calculations  Normal comprehension  Cranial Nerves     CN III, IV, VI   Pupils are equal, round, and reactive to light  Extraocular motions are normal    Nystagmus: none   Upgaze: normal  Conjugate gaze: present    CN V   Facial sensation intact  CN VII   Facial expression full, symmetric  CN VIII   Hearing: intact (finger rub)    CN XI   Right trapezius strength: normal  Left trapezius strength: normal    CN XII   Tongue: not atrophic  Fasciculations: absent  Tongue deviation: none    Motor Exam   Muscle bulk: normal  Overall muscle tone: normal  Right arm pronator drift: present (minimal )  Left arm pronator drift: absent    Strength   Strength 5/5 throughout  Sensory Exam   Light touch normal      Gait, Coordination, and Reflexes     Coordination   Finger to nose coordination: normal    Tremor   Resting tremor: absent  Intention tremor: absent  Action tremor: absent    Reflexes   Right Carroll: absent  Left Carroll: absent  Right ankle clonus: absent  Left ankle clonus: absent        MEDICAL DECISION MAKING    Imaging Studies:     Mri Brain With And Without Contrast    Result Date: 5/17/2021  Narrative: MRI BRAIN WITH AND WITHOUT CONTRAST INDICATION: D33 0: Benign neoplasm of brain, supratentorial  COMPARISON:  11/6/2020 TECHNIQUE: Sagittal T1, axial T2, axial FLAIR, axial T1, axial [place], axial diffusion  Sagittal, axial T1 postcontrast   Axial bravo postcontrast with coronal reconstructions  IV Contrast:  9 mL of Gadobutrol injection (SINGLE-DOSE)  IMAGE QUALITY:   Diagnostic  FINDINGS: BRAIN PARENCHYMA:  In the parasagittal aspect of the right occipital lobe on image 17, series 3 there is a small juxtacortical focus of diffusion restriction in the right posterior cerebral artery territory     Separately in the left middle cerebral artery territory, there is a small curvilinear band of cortical diffusion restriction in the left frontal lobe image 24, series 3   Small scattered hyperintensities on T2/FLAIR imaging are noted in the periventricular and subcortical white matter demonstrating an appearance that is statistically most likely to represent mild microangiopathic change  Again demonstrated is a dural based extra-axial enhancing mass lateral to the right frontal lobe on image 21, series 10, measuring 1 3 cm  This demonstrates associated diffusion restriction as well, likely related to hypercellularity, stable in appearance from the prior study  No associated edema or mass effect  No new or additional enhancing abnormalities  VENTRICLES:  Normal for the patient's age  SELLA AND PITUITARY GLAND:  Normal  ORBITS:  Normal  PARANASAL SINUSES:  Small mucous retention cyst in the right maxillary sinus  VASCULATURE:  Evaluation of the major intracranial vasculature demonstrates appropriate flow voids  CALVARIUM AND SKULL BASE:  Normal  EXTRACRANIAL SOFT TISSUES:  Normal      Impression: 1  Two foci of diffusion restriction in separate vascular territories in the left middle cerebral artery and right posterior cerebral artery territories indicative of acute/recent multifocal small cortical infarctions  A central embolic source such as atrial fibrillation should be excluded  Further clinical assessment and workup advised  2   Mild, chronic microangiopathy elsewhere  3   Small right frontal meningioma is stable  I personally discussed this study with Mil Jamison, the neurosurgical nurse navigator, who works with Serene Tabor on 5/17/2021 at 10:26 AM  It was decided to escort the patient to the emergency department for further assessment/workup of these acute infarctions  Workstation performed: [id number]       I have personally reviewed pertinent reports     and I have personally reviewed pertinent films in PACS

## 2021-05-25 NOTE — PROGRESS NOTES
[facility] [place]  [address] [place] [id number]  Cardiology Follow Up    Sabrina Espinosa  1945  [id number]      1  Hypertension, essential     2  Cerebrovascular accident (CVA) due to embolism of posterior cerebral artery, unspecified blood vessel laterality (HCC)  AMB extended holter monitor   3  Thoracic aortic aneurysm without rupture ([address] )     4  Hyperlipemia, mixed         Chief Complaint   Patient presents with    Follow-up                Interval History:   Patient presents for follow-up visit  Patient did have cardiac catheterization which showed moderate CAD not requiring intervention  Patient subsequently was seen in the emergency room and had MRI of the brain which showed 2 foci which could represent stroke  Possible embolic stroke from atrial arrhythmia was considered  Patient does not have any history of atrial fibrillation in the past   He does have shortness of breath with exertion  No chest pain  He also has history of thoracic aortic aneurysm followed by serial imaging studies  He states that he has been compliant with all his present medications  He also has history of  Meningioma  He also has been having issues with memory difficulty  He does have neurology follow-up      Patient Active Problem List   Diagnosis    Hypertension, essential    Thoracic aortic aneurysm without rupture ([address] )    Cerebral infarction ([address] )    Diabetic peripheral neuropathy ([address] )    History of CVA (cerebrovascular accident)    Dizziness and giddiness    Abnormal finding on MRI of brain    Benign neoplasm of supratentorial region of brain ([address] )    CVA (cerebral vascular accident) ([address] )     Past Medical History:   Diagnosis Date    Anxiety     Bradycardia     Cerebral infarction ([address] )     Diabetes mellitus ([address] )     Diabetic neuropathy ([address] )     Dilated aortic root ([address] )     Essential hypertension     Exercise-induced shortness of breath     Hypertension      Social History     Socioeconomic History    Marital status:       Spouse name: Not on file    Number of children: Not on file    Years of education: Not on file    Highest education level: Not on file   Occupational History    Occupation: Retired   Social Needs    Financial resource strain: Not on file    Food insecurity     Worry: Not on file     Inability: Not on file   [employer] needs     Medical: Not on file     Non-medical: Not on file   Tobacco Use    Smoking status: Former Smoker    Smokeless tobacco: Never Used   Substance and Sexual Activity    Alcohol use: Yes     Comment: occasionally    Drug use: No    Sexual activity: Not on file   Lifestyle    Physical activity     Days per week: Not on file     Minutes per session: Not on file    Stress: Not on file   Relationships    Social connections     Talks on phone: Not on file     Gets together: Not on file     Attends Orthodox service: Not on file     Active member of club or organization: Not on file     Attends meetings of clubs or organizations: Not on file     Relationship status: Not on file    Intimate partner violence     Fear of current or ex partner: Not on file     Emotionally abused: Not on file     Physically abused: Not on file     Forced sexual activity: Not on file   Other Topics Concern    Not on file   Social History Narrative    Not on file      Family History   Problem Relation Age of Onset    Ovarian cancer Mother     Cancer Sister      Past Surgical History:   Procedure Laterality Date    APPENDECTOMY         Current Outpatient Medications:     amLODIPine (NORVASC) 10 mg tablet, every morning  , Disp: , Rfl:     aspirin (ASPIR-LOW) 81 mg EC tablet, Take 1 tablet by mouth daily, Disp: , Rfl:     D3-50 1 25 MG (80661 UT) capsule, Take 50,000 Units by mouth once a week, Disp: , Rfl:     gabapentin (NEURONTIN) 300 mg capsule, Take 1 capsule (300 mg total) by mouth 2 (two) times a day, Disp: 60 capsule, Rfl: 0    glipiZIDE (GLUCOTROL XL) 2 5 mg 24 hr tablet, Take 1 tablet by mouth daily, Disp: , Rfl:     Jardiance 10 MG TABS, Take 10 mg by mouth daily, Disp: , Rfl:     losartan (COZAAR) 100 MG tablet, take 1 tablet by mouth once daily, Disp: 90 tablet, Rfl: 3    metoprolol succinate (TOPROL-XL) 50 mg 24 hr tablet, Take 1 tablet (50 mg total) by mouth 2 (two) times a day, Disp: 180 tablet, Rfl: 2    ONETOUCH DELICA LANCETS 42D MISC, use ONE LANCET twice a day, Disp: , Rfl: 0    ONETOUCH VERIO test strip, TEST twice a day, Disp: , Rfl: 0    pravastatin (PRAVACHOL) 10 mg tablet, Take 10 mg by mouth daily, Disp: , Rfl:     sildenafil (VIAGRA) 50 MG tablet, Take 50 mg by mouth daily as needed, Disp: , Rfl:     sitaGLIPtin (JANUVIA) 100 mg tablet, Take 1 tablet by mouth daily, Disp: , Rfl:   No Known Allergies    Labs:  Admission on 05/17/2021, Discharged on 05/17/2021   Component Date Value    WBC 05/17/2021 3 39*    RBC 05/17/2021 5 54     Hemoglobin 05/17/2021 14 9     Hematocrit 05/17/2021 46 5     MCV 05/17/2021 84     MCH 05/17/2021 26 9     MCHC 05/17/2021 32 0     RDW 05/17/2021 13 9     MPV 05/17/2021 10 0     Platelets 89/02/3746 140*    nRBC 05/17/2021 0     Neutrophils Relative 05/17/2021 47     Immat GRANS % 05/17/2021 1     Lymphocytes Relative 05/17/2021 41     Monocytes Relative 05/17/2021 10     Eosinophils Relative 05/17/2021 1     Basophils Relative 05/17/2021 0     Neutrophils Absolute 05/17/2021 1 60*    Immature Grans Absolute 05/17/2021 0 02     Lymphocytes Absolute 05/17/2021 1 40     Monocytes Absolute 05/17/2021 0 34     Eosinophils Absolute 05/17/2021 0 02     Basophils Absolute 05/17/2021 0 01     Protime 05/17/2021 13 7     INR 05/17/2021 1 11     PTT 05/17/2021 26     Sodium 05/17/2021 142     Potassium 05/17/2021 4 2     Chloride 05/17/2021 106     CO2 05/17/2021 27     ANION GAP 05/17/2021 9     BUN 05/17/2021 15     Creatinine 05/17/2021 1 24     Glucose 05/17/2021 109     Calcium 05/17/2021 9 0     Corrected Calcium 05/17/2021 9 5     AST 05/17/2021 16     ALT 05/17/2021 26     Alkaline Phosphatase 05/17/2021 84     Total Protein 05/17/2021 7 1     Albumin 05/17/2021 3 4*    Total Bilirubin 05/17/2021 0 40     eGFR 05/17/2021 66     Troponin I 05/17/2021 0 02     Ventricular Rate 05/17/2021 50     Atrial Rate 05/17/2021 50     AZ Interval 05/17/2021 220     QRSD Interval 05/17/2021 82     QT Interval 05/17/2021 460     QTC Interval 05/17/2021 419     P Axis 05/17/2021 49     QRS Axis 05/17/2021 3     T Wave Axis 05/17/2021 -[address] Outpatient Visit on 05/10/2021   Component Date Value    POC Glucose 05/10/2021 127     Activated Clotting Time,* 05/10/2021 237*    Specimen Type 05/10/2021 VENOUS     Ventricular Rate 05/10/2021 53     Atrial Rate 05/10/2021 53     AZ Interval 05/10/2021 214     QRSD Interval 05/10/2021 82     QT Interval 05/10/2021 440     QTC Interval 05/10/2021 412     P Axis 05/10/2021 57     QRS Axis 05/10/2021 3     T Wave Axis 05/10/2021 25      Imaging: Xr Chest 1 View Portable    Result Date: 5/17/2021  Narrative: CHEST INDICATION:   stroke work up (non-acute)  COMPARISON:  8/1/2015 EXAM PERFORMED/VIEWS:  XR CHEST PORTABLE FINDINGS: Cardiomediastinal silhouette appears unremarkable  The lungs are clear  No pneumothorax or pleural effusion  Osseous structures appear within normal limits for patient age  Impression: No acute cardiopulmonary disease  Workstation performed: [id number]     Mri Brain With And Without Contrast    Result Date: 5/17/2021  Narrative: MRI BRAIN WITH AND WITHOUT CONTRAST INDICATION: D33 0: Benign neoplasm of brain, supratentorial  COMPARISON:  11/6/2020 TECHNIQUE: Sagittal T1, axial T2, axial FLAIR, axial T1, axial [place], axial diffusion  Sagittal, axial T1 postcontrast   Axial bravo postcontrast with coronal reconstructions    IV Contrast:  9 mL of Gadobutrol injection (SINGLE-DOSE)  IMAGE QUALITY:   Diagnostic  FINDINGS: BRAIN PARENCHYMA:  In the parasagittal aspect of the right occipital lobe on image 17, series 3 there is a small juxtacortical focus of diffusion restriction in the right posterior cerebral artery territory     Separately in the left middle cerebral artery territory, there is a small curvilinear band of cortical diffusion restriction in the left frontal lobe image 24, series 3  Small scattered hyperintensities on T2/FLAIR imaging are noted in the periventricular and subcortical white matter demonstrating an appearance that is statistically most likely to represent mild microangiopathic change  Again demonstrated is a dural based extra-axial enhancing mass lateral to the right frontal lobe on image 21, series 10, measuring 1 3 cm  This demonstrates associated diffusion restriction as well, likely related to hypercellularity, stable in appearance from the prior study  No associated edema or mass effect  No new or additional enhancing abnormalities  VENTRICLES:  Normal for the patient's age  SELLA AND PITUITARY GLAND:  Normal  ORBITS:  Normal  PARANASAL SINUSES:  Small mucous retention cyst in the right maxillary sinus  VASCULATURE:  Evaluation of the major intracranial vasculature demonstrates appropriate flow voids  CALVARIUM AND SKULL BASE:  Normal  EXTRACRANIAL SOFT TISSUES:  Normal      Impression: 1  Two foci of diffusion restriction in separate vascular territories in the left middle cerebral artery and right posterior cerebral artery territories indicative of acute/recent multifocal small cortical infarctions  A central embolic source such as atrial fibrillation should be excluded  Further clinical assessment and workup advised  2   Mild, chronic microangiopathy elsewhere  3   Small right frontal meningioma is stable    I personally discussed this study with Marily Escobedo, the neurosurgical nurse navigator, who works with Emily Rice CHECK-BETSY on 5/17/2021 at 10:26 AM  It was decided to escort the patient to the emergency department for further assessment/workup of these acute infarctions  Workstation performed: [id number]       Review of Systems:  Review of Systems     REVIEW OF SYSTEMS:  Constitutional:  Denies fever or chills   Eyes:  Denies change in visual acuity   HENT:  Denies nasal congestion or sore throat   Respiratory:  Denies cough or shortness of breath   Cardiovascular:  Denies chest pain or edema   GI:  Denies abdominal pain, nausea, vomiting, bloody stools or diarrhea   :  Denies dysuria, frequency, difficulty in micturition and nocturia  Musculoskeletal:  Denies back pain or joint pain   Neurologic:    Memory difficulties   Endocrine:  Denies polyuria or polydipsia   Lymphatic:  Denies swollen glands   Psychiatric:  Denies depression or anxiety     Physical Exam:    /86   Pulse (!) 54   Ht 5' 11" (1 803 m)   Wt 99 8 kg (220 lb)   SpO2 96%   BMI 30 68 kg/m²     Physical Exam    PHYSICAL EXAM:  General:  Patient is not in acute distress   Head: Normocephalic, Atraumatic  HEENT:  Both pupils normal-size atraumatic, normocephalic, nonicteric  Neck:  JVP not raised  Trachea central  No carotid bruit  Respiratory:  normal breath sounds no crackles  no rhonchi  Cardiovascular:  Regular rate and rhythm no S3 no murmurs  GI:  Abdomen soft nontender  No organomegaly  Lymphatic:  No cervical or inguinal lymphadenopathy  Neurologic:  Patient is awake alert, oriented   Grossly nonfocal   extremities no edema    Discussion/Summary:   Patient with multiple medical problems who seems to be doing reasonably well from cardiac standpoint  Previous studies reviewed with patient  Medications reviewed and possible side effects discussed  concepts of cardiovascular disease , signs and symptoms of heart disease  Dietary and risk factor modification reinforced  All questions answered  Safety measures reviewed   Patient advised to report any problems prompting medical attention  results of cardiac catheterization as well as MRI of the brain  Reviewed with patient and family  Patient will follow-up with neuro surgery for  Meningioma  Patient also has follow-up with neurology for history of strokes and memory difficulty  Patient will be scheduled for Zio patch to look for any evidence of silent atrial fibrillation which could alter treatment  follow-up in a few months or earlier as needed  Patient is agreeable with the plan of care

## 2021-06-15 ENCOUNTER — TELEPHONE (OUTPATIENT)
Dept: NEUROLOGY | Facility: CLINIC | Age: 76
End: 2021-06-15

## 2021-06-15 NOTE — TELEPHONE ENCOUNTER
Spoke with patient to remind of appt with Dr Cate Henriquez on 6/18/21 at 8:30 am  Lenny Rodriguez to keep appt

## 2021-06-17 ENCOUNTER — CLINICAL SUPPORT (OUTPATIENT)
Dept: CARDIOLOGY CLINIC | Facility: CLINIC | Age: 76
End: 2021-06-17
Payer: COMMERCIAL

## 2021-06-17 DIAGNOSIS — I63.439 CEREBROVASCULAR ACCIDENT (CVA) DUE TO EMBOLISM OF POSTERIOR CEREBRAL ARTERY, UNSPECIFIED BLOOD VESSEL LATERALITY (HCC): ICD-10-CM

## 2021-06-17 PROCEDURE — 93248 EXT ECG>7D<15D REV&INTERPJ: CPT | Performed by: INTERNAL MEDICINE

## 2021-06-18 ENCOUNTER — OFFICE VISIT (OUTPATIENT)
Dept: NEUROLOGY | Facility: CLINIC | Age: 76
End: 2021-06-18
Payer: COMMERCIAL

## 2021-06-18 ENCOUNTER — TELEPHONE (OUTPATIENT)
Dept: OTHER | Facility: OTHER | Age: 76
End: 2021-06-18

## 2021-06-18 ENCOUNTER — APPOINTMENT (OUTPATIENT)
Dept: LAB | Facility: CLINIC | Age: 76
End: 2021-06-18
Payer: COMMERCIAL

## 2021-06-18 VITALS
SYSTOLIC BLOOD PRESSURE: 140 MMHG | HEIGHT: 72 IN | DIASTOLIC BLOOD PRESSURE: 90 MMHG | HEART RATE: 102 BPM | WEIGHT: 219 LBS | BODY MASS INDEX: 29.66 KG/M2

## 2021-06-18 DIAGNOSIS — Z86.73 HISTORY OF CVA (CEREBROVASCULAR ACCIDENT): ICD-10-CM

## 2021-06-18 DIAGNOSIS — R41.3 MEMORY DIFFICULTY: ICD-10-CM

## 2021-06-18 DIAGNOSIS — D33.0 BENIGN NEOPLASM OF SUPRATENTORIAL REGION OF BRAIN (HCC): ICD-10-CM

## 2021-06-18 DIAGNOSIS — E11.42 DIABETIC PERIPHERAL NEUROPATHY (HCC): ICD-10-CM

## 2021-06-18 DIAGNOSIS — I10 HYPERTENSION, ESSENTIAL: ICD-10-CM

## 2021-06-18 DIAGNOSIS — Z86.73 HISTORY OF CVA (CEREBROVASCULAR ACCIDENT): Primary | ICD-10-CM

## 2021-06-18 DIAGNOSIS — R42 DIZZINESS AND GIDDINESS: ICD-10-CM

## 2021-06-18 LAB
ANION GAP SERPL CALCULATED.3IONS-SCNC: 3 MMOL/L (ref 4–13)
BASOPHILS # BLD AUTO: 0.02 THOUSANDS/ΜL (ref 0–0.1)
BASOPHILS NFR BLD AUTO: 1 % (ref 0–1)
BUN SERPL-MCNC: 14 MG/DL (ref 5–25)
CALCIUM SERPL-MCNC: 9.8 MG/DL (ref 8.3–10.1)
CHLORIDE SERPL-SCNC: 109 MMOL/L (ref 100–108)
CO2 SERPL-SCNC: 28 MMOL/L (ref 21–32)
CREAT SERPL-MCNC: 1.1 MG/DL (ref 0.6–1.3)
EOSINOPHIL # BLD AUTO: 0.02 THOUSAND/ΜL (ref 0–0.61)
EOSINOPHIL NFR BLD AUTO: 1 % (ref 0–6)
ERYTHROCYTE [DISTWIDTH] IN BLOOD BY AUTOMATED COUNT: 14.6 % (ref 11.6–15.1)
GFR SERPL CREATININE-BSD FRML MDRD: 77 ML/MIN/1.73SQ M
GLUCOSE P FAST SERPL-MCNC: 112 MG/DL (ref 65–99)
HCT VFR BLD AUTO: 53.3 % (ref 36.5–49.3)
HGB BLD-MCNC: 16.9 G/DL (ref 12–17)
IMM GRANULOCYTES # BLD AUTO: 0 THOUSAND/UL (ref 0–0.2)
IMM GRANULOCYTES NFR BLD AUTO: 0 % (ref 0–2)
INR PPP: 1.08 (ref 0.84–1.19)
LYMPHOCYTES # BLD AUTO: 1.56 THOUSANDS/ΜL (ref 0.6–4.47)
LYMPHOCYTES NFR BLD AUTO: 52 % (ref 14–44)
MCH RBC QN AUTO: 27.6 PG (ref 26.8–34.3)
MCHC RBC AUTO-ENTMCNC: 31.7 G/DL (ref 31.4–37.4)
MCV RBC AUTO: 87 FL (ref 82–98)
MONOCYTES # BLD AUTO: 0.28 THOUSAND/ΜL (ref 0.17–1.22)
MONOCYTES NFR BLD AUTO: 10 % (ref 4–12)
NEUTROPHILS # BLD AUTO: 1.08 THOUSANDS/ΜL (ref 1.85–7.62)
NEUTS SEG NFR BLD AUTO: 36 % (ref 43–75)
NRBC BLD AUTO-RTO: 0 /100 WBCS
PLATELET # BLD AUTO: 170 THOUSANDS/UL (ref 149–390)
PMV BLD AUTO: 11.1 FL (ref 8.9–12.7)
POTASSIUM SERPL-SCNC: 4.5 MMOL/L (ref 3.5–5.3)
PROTHROMBIN TIME: 14 SECONDS (ref 11.6–14.5)
RBC # BLD AUTO: 6.12 MILLION/UL (ref 3.88–5.62)
SODIUM SERPL-SCNC: 140 MMOL/L (ref 136–145)
WBC # BLD AUTO: 2.96 THOUSAND/UL (ref 4.31–10.16)

## 2021-06-18 PROCEDURE — 85305 CLOT INHIBIT PROT S TOTAL: CPT

## 2021-06-18 PROCEDURE — 85025 COMPLETE CBC W/AUTO DIFF WBC: CPT | Performed by: INTERNAL MEDICINE

## 2021-06-18 PROCEDURE — 85613 RUSSELL VIPER VENOM DILUTED: CPT

## 2021-06-18 PROCEDURE — 85610 PROTHROMBIN TIME: CPT | Performed by: INTERNAL MEDICINE

## 2021-06-18 PROCEDURE — 86147 CARDIOLIPIN ANTIBODY EA IG: CPT

## 2021-06-18 PROCEDURE — 85670 THROMBIN TIME PLASMA: CPT

## 2021-06-18 PROCEDURE — 85306 CLOT INHIBIT PROT S FREE: CPT

## 2021-06-18 PROCEDURE — 99215 OFFICE O/P EST HI 40 MIN: CPT | Performed by: PSYCHIATRY & NEUROLOGY

## 2021-06-18 PROCEDURE — 85732 THROMBOPLASTIN TIME PARTIAL: CPT

## 2021-06-18 PROCEDURE — 85300 ANTITHROMBIN III ACTIVITY: CPT

## 2021-06-18 PROCEDURE — 85303 CLOT INHIBIT PROT C ACTIVITY: CPT

## 2021-06-18 PROCEDURE — 85705 THROMBOPLASTIN INHIBITION: CPT

## 2021-06-18 PROCEDURE — 36415 COLL VENOUS BLD VENIPUNCTURE: CPT | Performed by: INTERNAL MEDICINE

## 2021-06-18 PROCEDURE — 80048 BASIC METABOLIC PNL TOTAL CA: CPT | Performed by: INTERNAL MEDICINE

## 2021-06-18 PROCEDURE — 86146 BETA-2 GLYCOPROTEIN ANTIBODY: CPT

## 2021-06-18 NOTE — PROGRESS NOTES
Charlene Branch is a 70 y o  male  Chief Complaint   Patient presents with    History of CVA       Assessment:  1  History of CVA (cerebrovascular accident)    2  Dizziness and giddiness    3  Diabetic peripheral neuropathy ([address] )    4  Hypertension, essential    5  Benign neoplasm of supratentorial region of brain ([facility] [address] )    6   Memory difficulty        Plan:      Discussion:   patient's recent MRI scan of the brain with and without contrast from 05/17/2021 results were reviewed with the patient, there is a dural-based extra-axial enhancing mass flatter to the right frontal lobe measuring 1 3 cm, 2 foci of diffusion restriction  In separate vascular territories in the left MCA and right PCA territories raising the possibility of a central embolic source    I am not sure if it was from cardiac catheterization or patient has underlying paroxysmal atrial fib I doubt he has any hypercoagulable state but I would recommend a carotid ultrasound and a hypercoagulable workup, I have advised the patient to increase the aspirin to 325 mg once a day, will discuss with Cardiology if they do not think this was from cardiac catheterization then may be patient should be on empiric anticoagulation or the other option would be is to wait to see if he has any underlying atrial fib and then start him on anticoagulation,     also patient has mild cognitive impairment we discussed different options including starting him on medications he would like to wait for now we will repeat a Sebastian at the next visit in 3 months and if he has persistent deficits or any worsening then definitely he would need to be on Aricept, until then patient was advised safety precautions and mentally stimulating exercises,     stroke education was given to the patient, he was advised to keep his blood pressure cholesterol and sugar under control, to go to the hospital if has any stroke-like symptoms and call us otherwise to see me back in 3 months and follow up with his other physicians    Subjective:    HPI    patient is here in follow-up after his recent discharge from the hospital for acute CVA, prior to that patient did have cardiac catheterization and then he was not feeling well so he was seen in the emergency room and had an MRI of the brain which showed 2 foci of acute stroke possible embolic stroke, patient does have some issues with his memory especially some difficulty in attention and concentration and at times he feels that he has a brain fog but he is able to function is able to do his ADLs and is able to drive, he recently saw his cardiologist and is being worked up  For silent atrial fibrillation, he denies having any headaches no vision or speech difficulty, appetite is good weight has been stable no other complaints      Vitals:    06/18/21 0820   BP: 140/90   BP Location: Left arm   Patient Position: Sitting   Cuff Size: Adult   Pulse: 102   Weight: 99 3 kg (219 lb)   Height: 5' 11 5" (1 816 m)       Current Medications    Current Outpatient Medications:     amLODIPine (NORVASC) 10 mg tablet, every morning  , Disp: , Rfl:     aspirin (ASPIR-LOW) 81 mg EC tablet, Take 1 tablet by mouth daily, Disp: , Rfl:     D3-50 1 25 MG (90038 UT) capsule, Take 50,000 Units by mouth once a week, Disp: , Rfl:     gabapentin (NEURONTIN) 300 mg capsule, Take 1 capsule (300 mg total) by mouth 2 (two) times a day, Disp: 60 capsule, Rfl: 0    glipiZIDE (GLUCOTROL XL) 2 5 mg 24 hr tablet, Take 1 tablet by mouth daily, Disp: , Rfl:     Jardiance 10 MG TABS, Take 10 mg by mouth daily, Disp: , Rfl:     losartan (COZAAR) 100 MG tablet, take 1 tablet by mouth once daily, Disp: 90 tablet, Rfl: 3    metoprolol succinate (TOPROL-XL) 50 mg 24 hr tablet, Take 1 tablet (50 mg total) by mouth 2 (two) times a day, Disp: 180 tablet, Rfl: 2    ONETOUCH DELICA LANCETS 25K MISC, use ONE LANCET twice a day, Disp: , Rfl: 0    ONETOUCH VERIO test strip, TEST twice a day, Disp: , Rfl: 0    pravastatin (PRAVACHOL) 10 mg tablet, Take 10 mg by mouth daily, Disp: , Rfl:     sildenafil (VIAGRA) 50 MG tablet, Take 50 mg by mouth daily as needed, Disp: , Rfl:     sitaGLIPtin (JANUVIA) 100 mg tablet, Take 1 tablet by mouth daily, Disp: , Rfl:       Allergies  Patient has no known allergies  Past Medical History  Past Medical History:   Diagnosis Date    Anxiety     Bradycardia     Cere[facility] infarction [facility] [address] )     Diabetes mellitus ([facility] [address] )     Diabetic neuropathy (HCC)     Dilated aortic root (HCC)     Essential hypertension     Exercise-induced shortness of breath     Hypertension          Past Surgical History:  Past Surgical History:   Procedure Laterality Date    APPENDECTOMY           Family History:  Family History   Problem Relation Age of Onset    Ovarian cancer Mother     Cancer Sister     No Known Problems Father     No Known Problems Brother     No Known Problems Maternal Grandmother     No Known Problems Maternal Grandfather     No Known Problems Paternal Grandmother     No Known Problems Paternal Grandfather     No Known Problems Brother     No Known Problems Sister     No Known Problems Sister     No Known Problems Son     No Known Problems Son     No Known Problems Daughter     No Known Problems Daughter     No Known Problems Daughter        Social History:   reports that he quit smoking about 51 years ago  His smoking use included cigarettes  He has never used smokeless tobacco  He reports current alcohol use of about 3 0 - 4 0 standard drinks of alcohol per week  He reports previous drug use  Drug: Marijuana  I have reviewed the past medical history, surgical history, social and family history, current medications, allergies vitals, review of systems, and updated this information as appropriate today  Objective:    Physical Exam    Neurological Exam    GENERAL:  Cooperative in no acute distress   Well-developed and well-nourished    HEAD and NECK   Head is atraumatic normocephalic with no lesions or masses  Neck is supple with full range of motion    CARDIOVASCULAR  Carotid Arteries-no carotid bruits  NEUROLOGIC:  Mental Status-the patient is awake alert and oriented without aphasia or apraxia, [place] is 22/30  Cranial Nerves: Visual fields are full to confrontation  Extraocular movements are full without nystagmus  Pupils are 2-1/2 mm and reactive  Face is symmetrical to light touch  Movements of facial expression move symmetrically  Hearing is normal to finger rub bilaterally  Soft palate lifts symmetrically  Shoulder shrug is symmetrical  Tongue is midline without atrophy  Motor: No drift is noted on arm extension  Strength is full in the upper and lower extremities with normal bulk and tone  Coordination: Finger to nose testing is performed accurately  Romberg is negative  Gait reveals a normal base with symmetrical arm swing  Tandem walk is normal   Reflexes:    1+ and symmetrical          ROS:  Review of Systems   Constitutional: Positive for fatigue  Negative for appetite change and fever  HENT: Negative for drooling, ear pain, tinnitus, trouble swallowing and voice change  Eyes: Positive for visual disturbance (Blurry vision)  Negative for photophobia and pain  Respiratory: Negative for chest tightness and shortness of breath  Cardiovascular: Negative for chest pain, palpitations and leg swelling  Gastrointestinal: Negative for abdominal pain, constipation, diarrhea, nausea and vomiting  Endocrine: Negative for cold intolerance and heat intolerance  Genitourinary: Positive for frequency and urgency  Negative for difficulty urinating  Musculoskeletal: Positive for back pain (Low back)  Negative for gait problem, joint swelling, myalgias and neck pain  Skin: Negative for rash  Neurological: Positive for light-headedness and numbness (And tingling on both legs)   Negative for dizziness, tremors, seizures, syncope, facial asymmetry, speech difficulty, weakness and headaches  Psychiatric/Behavioral: Negative for agitation, behavioral problems, confusion, decreased concentration, dysphoric mood and sleep disturbance  The patient is not nervous/anxious and is not hyperactive

## 2021-06-19 LAB — DEPRECATED AT III PPP: 80 % OF NORMAL (ref 92–136)

## 2021-06-21 ENCOUNTER — TELEPHONE (OUTPATIENT)
Dept: CARDIOLOGY CLINIC | Facility: CLINIC | Age: 76
End: 2021-06-21

## 2021-06-21 LAB
PROT C AG ACT/NOR PPP IA: 129 % OF NORMAL (ref 60–150)
PROT S ACT/NOR PPP: 66 % (ref 57–157)
PROT S ACT/NOR PPP: 91 % (ref 71–117)
PROT S PPP-ACNC: 62 % (ref 60–150)

## 2021-06-21 NOTE — TELEPHONE ENCOUNTER
----- Message from Juan Pablo Leong MD sent at 6/20/2021  5:16 PM EDT -----  Regarding: Alli Eans patch results   Please let the patient know that the Zio patch did not show any significant arrhythmias

## 2021-06-22 LAB
B2 GLYCOPROT1 IGA SERPL IA-ACNC: 18
B2 GLYCOPROT1 IGG SERPL IA-ACNC: 2.2
B2 GLYCOPROT1 IGM SERPL IA-ACNC: <2.9
CARDIOLIPIN IGA SER IA-ACNC: 5.8
CARDIOLIPIN IGG SER IA-ACNC: 1.3
CARDIOLIPIN IGM SER IA-ACNC: 1.2

## 2021-06-23 LAB
APTT SCREEN TO CONFIRM RATIO: 1.11 RATIO (ref 0–1.4)
CONFIRM APTT/NORMAL: 45.4 SEC (ref 0–55)
F2 GENE MUT ANL BLD/T: NORMAL
F5 GENE MUT ANL BLD/T: NORMAL
LA PPP-IMP: NORMAL
SCREEN APTT: 38.3 SEC (ref 0–51.9)
SCREEN DRVVT: 34.8 SEC (ref 0–47)
THROMBIN TIME: 19.8 SEC (ref 0–23)

## 2021-07-13 DIAGNOSIS — E11.42 DIABETIC PERIPHERAL NEUROPATHY (HCC): ICD-10-CM

## 2021-07-13 RX ORDER — GABAPENTIN 300 MG/1
300 CAPSULE ORAL 2 TIMES DAILY
Qty: 60 CAPSULE | Refills: 2 | Status: SHIPPED | OUTPATIENT
Start: 2021-07-13 | End: 2021-11-12 | Stop reason: SDUPTHER

## 2021-09-08 DIAGNOSIS — I10 HYPERTENSION, ESSENTIAL: ICD-10-CM

## 2021-09-08 RX ORDER — METOPROLOL SUCCINATE 50 MG/1
50 TABLET, EXTENDED RELEASE ORAL 2 TIMES DAILY
Qty: 180 TABLET | Refills: 3 | Status: SHIPPED | OUTPATIENT
Start: 2021-09-08 | End: 2022-07-14

## 2021-09-15 DIAGNOSIS — I10 HYPERTENSION, ESSENTIAL: ICD-10-CM

## 2021-09-15 RX ORDER — LOSARTAN POTASSIUM 100 MG/1
TABLET ORAL
Qty: 90 TABLET | Refills: 3 | Status: SHIPPED | OUTPATIENT
Start: 2021-09-15

## 2021-10-01 ENCOUNTER — TELEPHONE (OUTPATIENT)
Dept: NEUROLOGY | Facility: CLINIC | Age: 76
End: 2021-10-01

## 2021-11-12 DIAGNOSIS — E11.42 DIABETIC PERIPHERAL NEUROPATHY (HCC): ICD-10-CM

## 2021-11-12 RX ORDER — GABAPENTIN 300 MG/1
300 CAPSULE ORAL 2 TIMES DAILY
Qty: 60 CAPSULE | Refills: 6 | Status: SHIPPED | OUTPATIENT
Start: 2021-11-12

## 2022-02-16 ENCOUNTER — TELEPHONE (OUTPATIENT)
Dept: NEUROLOGY | Facility: CLINIC | Age: 77
End: 2022-02-16

## 2022-02-21 ENCOUNTER — OFFICE VISIT (OUTPATIENT)
Dept: NEUROLOGY | Facility: CLINIC | Age: 77
End: 2022-02-21
Payer: COMMERCIAL

## 2022-02-21 VITALS
DIASTOLIC BLOOD PRESSURE: 80 MMHG | WEIGHT: 218.8 LBS | SYSTOLIC BLOOD PRESSURE: 138 MMHG | HEIGHT: 71 IN | HEART RATE: 57 BPM | TEMPERATURE: 97.3 F | OXYGEN SATURATION: 98 % | BODY MASS INDEX: 30.63 KG/M2

## 2022-02-21 DIAGNOSIS — R41.3 MEMORY DIFFICULTY: ICD-10-CM

## 2022-02-21 DIAGNOSIS — E11.42 DIABETIC PERIPHERAL NEUROPATHY (HCC): ICD-10-CM

## 2022-02-21 DIAGNOSIS — R42 DIZZINESS AND GIDDINESS: ICD-10-CM

## 2022-02-21 DIAGNOSIS — Z86.73 HISTORY OF CVA (CEREBROVASCULAR ACCIDENT): Primary | ICD-10-CM

## 2022-02-21 PROCEDURE — 99214 OFFICE O/P EST MOD 30 MIN: CPT | Performed by: PSYCHIATRY & NEUROLOGY

## 2022-02-21 RX ORDER — BIMATOPROST 0.01 %
DROPS OPHTHALMIC (EYE)
COMMUNITY
Start: 2022-02-11

## 2022-02-21 NOTE — PROGRESS NOTES
Marivel Selby is a 78 y o  male  Chief Complaint   Patient presents with    cerebrovascular accident       Assessment:  1  History of CVA (cerebrovascular accident)    2  Memory difficulty    3  Dizziness and giddiness    4  Diabetic peripheral neuropathy (HCC)        Plan:  MRI of the brain with neuro quant  EEG  Blood work  Take a multivitamin every day  Mentally stimulating exercises  Follow-up in 4 months    Discussion:  Patient was advised to have an MRI of the brain with neuro quant to evaluate for his memory difficulty and also given his prior history of stroke  Also would recommend an EEG and blood work, patient to call me after the above test to discuss the results  He has the underlying right frontal meningioma for which he was advised to continue follow-up with his neurosurgeon  Neuropathy symptoms seems to be under control on gabapentin  His [place] is 26/30 and was advised mentally stimulating exercises and to take safety precautions, his daughter is planning to come and stay with him and he was also advised to be careful with his driving, to go to the hospital if has any worsening symptoms and call me otherwise to see me back in 3-4 months and follow up with his other physicians      Subjective:    HPI   Patient is here in follow-up for his history of CVA, since his last visit he tells me that he had 1 episode wherein he was watching TV and suddenly did not know what happened he could not remember for a few minutes, he sometimes has short-term memory issues but he is able to manage his finances and ADLs he is not having any issues with his driving he has a prior history of stroke and has had 2 foci of diffusion restriction and different vascular territories in the left MCA and right PCA territories, he is in follow up with Cardiology I am not sure if that was after cardiac catheterization or if this was due to questionable paroxysmal atrial fib, is currently on aspirin I will reach out to his Cardiology to make sure that he does not have any underlying arrhythmia, also denies any numbness and tingling in the feet it seems to be controlled on gabapentin, appetite is good weight has been good no episodes of passing out no seizures otherwise he is doing good    No other complaints    Vitals:    02/21/22 0815   BP: 138/80   BP Location: Left arm   Patient Position: Sitting   Cuff Size: Standard   Pulse: 57   Temp: (!) 97 3 °F (36 3 °C)   TempSrc: Temporal   SpO2: 98%   Weight: 99 2 kg (218 lb 12 8 oz)   Height: 5' 11" (1 803 m)       Current Medications    Current Outpatient Medications:     amLODIPine (NORVASC) 10 mg tablet, every morning  , Disp: , Rfl:     aspirin (ASPIR-LOW) 81 mg EC tablet, Take 1 tablet by mouth daily, Disp: , Rfl:     D3-50 1 25 MG (92135 UT) capsule, Take 50,000 Units by mouth once a week, Disp: , Rfl:     gabapentin (NEURONTIN) 300 mg capsule, Take 1 capsule (300 mg total) by mouth 2 (two) times a day, Disp: 60 capsule, Rfl: 6    glipiZIDE (GLUCOTROL XL) 2 5 mg 24 hr tablet, Take 1 tablet by mouth daily, Disp: , Rfl:     Jardiance 10 MG TABS, Take 10 mg by mouth daily, Disp: , Rfl:     losartan (COZAAR) 100 MG tablet, take 1 tablet by mouth once daily, Disp: 90 tablet, Rfl: 3    Lumigan 0 01 % ophthalmic drops, instill 1 drop into both eyes once daily at bedtime, Disp: , Rfl:     metoprolol succinate (TOPROL-XL) 50 mg 24 hr tablet, Take 1 tablet (50 mg total) by mouth 2 (two) times a day, Disp: 180 tablet, Rfl: 3    ONETOUCH DELICA LANCETS 45S MISC, use ONE LANCET twice a day, Disp: , Rfl: 0    ONETOUCH VERIO test strip, TEST twice a day, Disp: , Rfl: 0    sitaGLIPtin (JANUVIA) 100 mg tablet, Take 1 tablet by mouth daily, Disp: , Rfl:     pravastatin (PRAVACHOL) 10 mg tablet, Take 10 mg by mouth daily, Disp: , Rfl:     sildenafil (VIAGRA) 50 MG tablet, Take 50 mg by mouth daily as needed (Patient not taking: Reported on 2/21/2022 ), Disp: , Rfl: Allergies  Patient has no known allergies  Past Medical History  Past Medical History:   Diagnosis Date    Anxiety     Bradycardia     [facility] [address] )     Diabetes mellitus ([facility] [address] )     Diabetic neuropathy (HCC)     Dilated aortic root (HCC)     Essential hypertension     Exercise-induced shortness of breath     Hypertension          Past Surgical History:  Past Surgical History:   Procedure Laterality Date    APPENDECTOMY           Family History:  Family History   Problem Relation Age of Onset    Ovarian cancer Mother     Cancer Sister     No Known Problems Father     No Known Problems Brother     No Known Problems Maternal Grandmother     No Known Problems Maternal Grandfather     No Known Problems Paternal Grandmother     No Known Problems Paternal Grandfather     No Known Problems Brother     No Known Problems Sister     No Known Problems Sister     No Known Problems Son     No Known Problems Son     No Known Problems Daughter     No Known Problems Daughter     No Known Problems Daughter        Social History:   reports that he quit smoking about 52 years ago  His smoking use included cigarettes  He has never used smokeless tobacco  He reports current alcohol use of about 3 0 - 4 0 standard drinks of alcohol per week  He reports previous drug use  Drug: Marijuana  I have reviewed the past medical history, surgical history, social and family history, current medications, allergies vitals, review of systems, and updated this information as appropriate today  Objective:    Physical Exam    Neurological Exam    GENERAL:  Cooperative in no acute distress  Well-developed and well-nourished    HEAD and NECK   Head is atraumatic normocephalic with no lesions or masses  Neck is supple with full range of motion    CARDIOVASCULAR  Carotid Arteries-no carotid bruits      NEUROLOGIC:  Mental Status-the patient is awake alert and oriented without aphasia or apraxia, initial [place] was 22/30, repeat [place] was 26/30  Cranial Nerves: Visual fields are full to confrontation  Discs are flat  Extraocular movements are full without nystagmus  Pupils are 2-1/2 mm and reactive  Face is symmetrical to light touch  Movements of facial expression move symmetrically  Hearing is normal to finger rub bilaterally  Soft palate lifts symmetrically  Shoulder shrug is symmetrical  Tongue is midline without atrophy  Motor: No drift is noted on arm extension  Strength is full in the upper and lower extremities with normal bulk and tone  Coordination: Finger to nose testing is performed accurately  Romberg is negative  Gait reveals a normal base with symmetrical arm swing  ROS:  Review of Systems   Constitutional: Negative  Negative for appetite change and fever  HENT: Negative  Negative for hearing loss, tinnitus, trouble swallowing and voice change  Eyes: Negative  Negative for photophobia and pain  Respiratory: Negative  Negative for shortness of breath  Cardiovascular: Negative  Negative for palpitations  Gastrointestinal: Negative  Negative for nausea and vomiting  Endocrine: Negative  Negative for cold intolerance  Genitourinary: Negative for dysuria, frequency and urgency  Musculoskeletal: Negative  Negative for myalgias and neck pain  Skin: Negative  Negative for rash  Neurological: Negative  Negative for dizziness, tremors, seizures, syncope, facial asymmetry, speech difficulty, weakness, light-headedness, numbness and headaches  Hematological: Negative  Does not bruise/bleed easily  Psychiatric/Behavioral: Positive for confusion  Negative for hallucinations and sleep disturbance

## 2022-02-23 ENCOUNTER — TELEPHONE (OUTPATIENT)
Dept: NEUROLOGY | Facility: CLINIC | Age: 77
End: 2022-02-23

## 2022-02-23 NOTE — TELEPHONE ENCOUNTER
Discussed with patient regarding the abnormal blood work including abnormal RPR, advised him to follow up with family physician regarding that and may need to see an infectious disease specialist, patient is going to take care of that

## 2022-02-24 ENCOUNTER — TELEPHONE (OUTPATIENT)
Dept: NEUROLOGY | Facility: CLINIC | Age: 77
End: 2022-02-24

## 2022-02-24 NOTE — TELEPHONE ENCOUNTER
----- Message from Flory Dye MD sent at 2/23/2022 12:05 PM EST -----  Please call the patient regarding his abnormal result    Please forward this blood work to patient's family physician patient has been advised to follow-up with PCP regarding abnormal RPR

## 2022-02-24 NOTE — TELEPHONE ENCOUNTER
Called Dr Roxann Jimenes office  Advised that lab results to be faxed to them  Fax [phone]  Results printed and faxed

## 2022-04-29 ENCOUNTER — HOSPITAL ENCOUNTER (OUTPATIENT)
Dept: NEUROLOGY | Facility: HOSPITAL | Age: 77
Discharge: HOME | End: 2022-04-29
Attending: PSYCHIATRY & NEUROLOGY
Payer: COMMERCIAL

## 2022-04-29 ENCOUNTER — HOSPITAL ENCOUNTER (OUTPATIENT)
Dept: MRI IMAGING | Facility: HOSPITAL | Age: 77
Discharge: HOME | End: 2022-04-29
Attending: PSYCHIATRY & NEUROLOGY
Payer: COMMERCIAL

## 2022-04-29 DIAGNOSIS — R41.3 MEMORY DIFFICULTY: ICD-10-CM

## 2022-04-29 PROCEDURE — G1004 CDSM NDSC: HCPCS

## 2022-04-29 PROCEDURE — 70551 MRI BRAIN STEM W/O DYE: CPT

## 2022-04-29 PROCEDURE — 76377 3D RENDER W/INTRP POSTPROCES: CPT

## 2022-04-29 PROCEDURE — 95819 EEG AWAKE AND ASLEEP: CPT | Performed by: PSYCHIATRY & NEUROLOGY

## 2022-04-29 PROCEDURE — 95816 EEG AWAKE AND DROWSY: CPT

## 2022-05-04 ENCOUNTER — OFFICE VISIT (OUTPATIENT)
Dept: CARDIOLOGY CLINIC | Facility: CLINIC | Age: 77
End: 2022-05-04
Payer: COMMERCIAL

## 2022-05-04 VITALS
OXYGEN SATURATION: 99 % | BODY MASS INDEX: 29.68 KG/M2 | DIASTOLIC BLOOD PRESSURE: 84 MMHG | WEIGHT: 212 LBS | HEART RATE: 57 BPM | RESPIRATION RATE: 16 BRPM | HEIGHT: 71 IN | SYSTOLIC BLOOD PRESSURE: 128 MMHG

## 2022-05-04 DIAGNOSIS — I10 HYPERTENSION, ESSENTIAL: Primary | ICD-10-CM

## 2022-05-04 DIAGNOSIS — I25.10 CORONARY ARTERY DISEASE INVOLVING NATIVE CORONARY ARTERY OF NATIVE HEART WITHOUT ANGINA PECTORIS: ICD-10-CM

## 2022-05-04 DIAGNOSIS — E78.2 HYPERLIPEMIA, MIXED: ICD-10-CM

## 2022-05-04 DIAGNOSIS — I71.2 THORACIC AORTIC ANEURYSM WITHOUT RUPTURE (HCC): ICD-10-CM

## 2022-05-04 PROCEDURE — 99214 OFFICE O/P EST MOD 30 MIN: CPT | Performed by: INTERNAL MEDICINE

## 2022-05-04 NOTE — PROGRESS NOTES
[facility] [address] [address]  Cardiology Follow Up    Liane Simpson  1945  [id number]      1  Hypertension, essential     2  Hyperlipemia, mixed     3  Thoracic aortic aneurysm without rupture ([facility] )  CT chest without contrast   4  Coronary artery disease involving native coronary artery of native heart without angina pectoris         Chief Complaint   Patient presents with    Follow-up       Interval History:  Patient presents for follow-up visit  Patient denies any history of chest pain shortness of breath  Patient denies any history of leg edema or orthopnea PND  No history of presyncope syncope  Patient states compliance with the present list of medications  Patient denies any bleeding issues  Patient Active Problem List   Diagnosis    Hypertension, essential    Thoracic aortic aneurysm without rupture ([facility] )    Cerebral infarction ([facility] )    Diabetic peripheral neuropathy ([facility] )    History of CVA (cerebrovascular accident)    Dizziness and giddiness    Abnormal finding on MRI of brain    Benign neoplasm of supratentorial region of brain ([facility] )    CVA (cerebral vascular accident) ([facility] )    Memory difficulty     Past Medical History:   Diagnosis Date    Anxiety     Bradycardia     Cerebral infarction ([address]     Diabetes mellitus ([address] )     Diabetic neuropathy ([facility] [address] )     Dilated aortic root ([facility] )     Essential hypertension     Exercise-induced shortness of breath     Hypertension      Social History     Socioeconomic History    Marital status:       Spouse name: Not on file    Number of children: Not on file    Years of education: Not on file    Highest education level: Not on file   Occupational History    Occupation: Retired   Tobacco Use    Smoking status: Former Smoker     Types: Cigarettes     Quit date: 1970     Years since quittin 3    Smokeless tobacco: Never Used   Vaping Use    Vaping Use: Never used   Substance and Sexual Activity    Alcohol use:  Yes     Alcohol/week: 3 0 - 4 0 standard drinks     Types: 1 - 2 Cans of beer, 1 Glasses of wine, 1 Shots of liquor per week     Comment: occasionally    Drug use: Not Currently     Types: Marijuana     Comment: Quit in 1970    Sexual activity: Not on file   Other Topics Concern    Not on file   Social History Narrative    Not on file     Social Determinants of Health     Financial Resource Strain: Not on file   Food Insecurity: Not on file   Transportation Needs: Not on file   Physical Activity: Not on file   Stress: Not on file   Social Connections: Not on file   Intimate Partner Violence: Not on file   Housing Stability: Not on file      Family History   Problem Relation Age of Onset    Ovarian cancer Mother     Cancer Sister     No Known Problems Father     No Known Problems Brother     No Known Problems Maternal Grandmother     No Known Problems Maternal Grandfather     No Known Problems Paternal Grandmother     No Known Problems Paternal Grandfather     No Known Problems Brother     No Known Problems Sister     No Known Problems Sister     No Known Problems Son     No Known Problems Son     No Known Problems Daughter     No Known Problems Daughter     No Known Problems Daughter      Past Surgical History:   Procedure Laterality Date    APPENDECTOMY         Current Outpatient Medications:     amLODIPine (NORVASC) 10 mg tablet, every morning  , Disp: , Rfl:     aspirin (ASPIR-LOW) 81 mg EC tablet, Take 1 tablet by mouth daily, Disp: , Rfl:     D3-50 1 25 MG (31100 UT) capsule, Take 50,000 Units by mouth once a week, Disp: , Rfl:     gabapentin (NEURONTIN) 300 mg capsule, Take 1 capsule (300 mg total) by mouth 2 (two) times a day, Disp: 60 capsule, Rfl: 6    glipiZIDE (GLUCOTROL XL) 2 5 mg 24 hr tablet, Take 1 tablet by mouth daily, Disp: , Rfl:     Jardiance 10 MG TABS, Take 10 mg by mouth daily, Disp: , Rfl:     losartan (COZAAR) 100 MG tablet, take 1 tablet by mouth once daily, Disp: 90 tablet, Rfl: 3    Lumigan 0 01 % ophthalmic drops, instill 1 drop into both eyes once daily at bedtime, Disp: , Rfl:     metoprolol succinate (TOPROL-XL) 50 mg 24 hr tablet, Take 1 tablet (50 mg total) by mouth 2 (two) times a day, Disp: 180 tablet, Rfl: 3    ONETOUCH DELICA LANCETS 46I MISC, use ONE LANCET twice a day, Disp: , Rfl: 0    ONETOUCH VERIO test strip, TEST twice a day, Disp: , Rfl: 0    pravastatin (PRAVACHOL) 10 mg tablet, Take 10 mg by mouth daily, Disp: , Rfl:     sildenafil (VIAGRA) 50 MG tablet, Take 50 mg by mouth daily as needed  , Disp: , Rfl:     sitaGLIPtin (JANUVIA) 100 mg tablet, Take 1 tablet by mouth daily, Disp: , Rfl:   No Known Allergies    Labs:  No visits with results within 2 Month(s) from this visit     Latest known visit with results is:   Appointment on 06/18/2021   Component Date Value    AntiThrombIN III Activity 06/18/2021 80*    ANTICARDIOLIPIN IGG ANTI* 06/18/2021 1 3     ANTICARDIOLIPIN IGA ANTI* 06/18/2021 5 8     ANTICARDIOLIPIN IGM ANTI* 06/18/2021 1 2     Factor V Leiden 06/18/2021 Comment     PTT Lupus Anticoagulant 06/18/2021 38 3     Dilute Viper Venom Time 06/18/2021 34 8     DILUTE PROTHROMBIN TIME(* 06/18/2021 45 4     THROMBIN TIME (DRVW) 06/18/2021 19 8     DPT CONFIRM RATIO 06/18/2021 1 11     LUPUS REFLEX INTERPRETAT* 06/18/2021 Comment:     Protein C Activity 06/18/2021 129 0     PROTEIN S ACTIVITY 06/18/2021 91     Protein S Ag, Total 06/18/2021 62     Protein S Ag, Free 06/18/2021 66     Prothrombin Mutation 06/18/2021 Comment     BETA 2 GLYCO 1 IGG 06/18/2021 2 2     BETA 2 GLYCO 1 IGA 06/18/2021 18 0     BETA 2 GLYCO 1 IGM 06/18/2021 <2 9      Imaging: MRI brain NeuroQuant wo contrast    Result Date: 5/2/2022  Narrative: MRI BRAIN WITHOUT CONTRAST, NeuroQuant IMAGING INDICATION: Memory difficulty COMPARISON:   Brain MRI 5/17/2021 TECHNIQUE:  Sagittal T1, axial T2, axial Juan, axial T1, axial FLAIR, axial diffusion imaging  Coronal T2 Sagittal 3D volumetric imaging processed by [facility] and Age Related Atrophy reports  IMAGE QUALITY:  Diagnostic  FINDINGS: BRAIN PARENCHYMA:  There is no discrete mass, mass effect or midline shift  Brainstem and cerebellum demonstrate normal signal  There is no intracranial hemorrhage  Diffusion imaging is unremarkable  Stable old left frontal cortical infarct  Stable old right basal ganglia and right cerebellum lacunar infarcts  Nonspecific  foci of T2/FLAIR hyperintensities involving periventricular and subcortical white matter, most compatible with mild microangiopathic change  Right frontal lobe 13 x 8 7 cm meningioma (series 7 image 21), grossly stable from 5/17/2021, but enlarged from 9/15/2010 where it measured 0 5 x 0 4 cm  QUANTITATIVE: Exam Quality: Adequate for volumetric analysis  Hippocampus Hippocampal Occupancy Score (HOC):                   0 71      Percentile for similar age:                              48 Total hippocampal volume (cc):                           6 13   Percentile for similar age:                              72 Entorhinal cortex (cc)                                            5 28     Percentile for similar age:                                   48           Superior Lateral ventricular volume (cc):             41 40   Percentile for similar age:                             59 Inferior lateral ventricular volume (cc):                    2 54   Percentile for similar age:                                64 Quantitative conclusions:      Hippocampal Volume:                       Normal volume      Entorhinal Volume:                            Normal volume      Superior Lateral Ventricle Volume:     Normal Volume      Inferior Lateral Ventricle Volume:       Normal Volume Concordance between qualitative and quantitative hippocampal volume assessment: Concordant   Change in brain volumes: No previous volumetric study for comparison Mean hippocampal volume loss among normal elderly: 0 7% per year, (-0 3 to 1 7;  Karen New 2008; also Alejo 2010)  VENTRICLES:  The ventricles are normal in size and contour  SELLA AND PITUITARY GLAND:  Normal  ORBITS:  Normal  PARANASAL SINUSES:  Normal  VASCULATURE:  Evaluation of the major intracranial vasculature demonstrates appropriate flow voids  CALVARIUM AND SKULL BASE:  Normal  EXTRACRANIAL SOFT TISSUES:  Normal      Impression: 1  Stable sequela of old ischemia and mild microangiopathic changes  2    Cerebral atrophy  NeuroQuant analysis was performed: Normal study; Does not support mesial temporal lobe focused neurodegeneration  Workstation performed: [id number]     EEG Routine and awake    Result Date: 4/29/2022  Narrative: EEG This is a routine 28 channel EEG recording performed for 20 minutes and 55 seconds beginning at 12:52 p m  on a 70-year-old man with a history of memory difficulty  Background activities during wakefulness consist of mid amplitude 8-9 cycle per second activities emanating from the posterior head region  These activities are reactive to eye opening  Intermingled with background activities are a moderate amount of lower amplitude beta activities emanating from the frontocentral head regions  Episodes of drowsiness and sleep were manifested by attenuation of background activities, with V-waves and sleep spindles  Photic stimulation was performed over a wide range of flash frequencies and produced little in the way of a driving response  Hyperventilation was not obtained  Concomitant EKG revealed a sinus rhythm  Impression: This is a normal routine EEG [place] [facility]             Review of Systems:  Review of Systems   REVIEW OF SYSTEMS:  Constitutional:  Denies fever or chills   Eyes:  Denies change in visual acuity   HENT:  Denies nasal congestion or sore throat   Respiratory:  Denies cough or shortness of breath   Cardiovascular:  Denies chest pain or edema   GI:  Denies abdominal pain, nausea, vomiting, bloody stools or diarrhea   :  Denies dysuria, frequency, difficulty in micturition and nocturia  Musculoskeletal:  Denies back pain or joint pain   Neurologic:  Denies headache, focal weakness or sensory changes   Endocrine:  Denies polyuria or polydipsia   Lymphatic:  Denies swollen glands   Psychiatric:  Denies depression or anxiety     Physical Exam:    /84 (BP Location: Left arm, Patient Position: Sitting)   Pulse 57   Resp 16   Ht 5' 11" (1 803 m)   Wt 96 2 kg (212 lb)   SpO2 99%   BMI 29 57 kg/m²     Physical Exam     PHYSICAL EXAM:  General:  Patient is not in acute distress   Head: Normocephalic, Atraumatic  HEENT:  Both pupils normal-size atraumatic, normocephalic, nonicteric  Neck:  JVP not raised  Trachea central  No carotid bruit  Respiratory:  normal breath sounds no crackles  no rhonchi  Cardiovascular:  Regular rate and rhythm no S3 no murmurs  GI:  Abdomen soft nontender  No organomegaly  Lymphatic:  No cervical or inguinal lymphadenopathy  Neurologic:  Patient is awake alert, oriented   Grossly nonfocal  Extremities no edema      Discussion/Summary:  Patient with multiple medical problems who seems to be doing reasonably well from cardiac standpoint  Previous studies reviewed with patient  Medications reviewed and possible side effects discussed  concepts of cardiovascular disease , signs and symptoms of heart disease  Dietary and risk factor modification reinforced  All questions answered  Safety measures reviewed  Patient advised to report any problems prompting medical attention  Results of cardiac catheterization done last summer reviewed  Patient had moderate CAD not requiring any intervention  Patient will continue dietary and risk factor modification as well as medical therapy  Patient does have history of dilated thoracic aorta    Patient will be scheduled for CT of the chest to reassess the size of dilated thoracic aorta  Symptoms to watch out from cardiac standpoint which would indicate the need for further cardiac evaluation discussed  Follow-up with primary care physician  Followup in 6 months or earlier as needed  Patient is agreeable with the plan of care

## 2022-05-17 ENCOUNTER — HOSPITAL ENCOUNTER (OUTPATIENT)
Dept: MRI IMAGING | Facility: HOSPITAL | Age: 77
Discharge: HOME | End: 2022-05-17
Payer: COMMERCIAL

## 2022-05-17 DIAGNOSIS — D33.0 BENIGN NEOPLASM OF SUPRATENTORIAL REGION OF BRAIN (HCC): ICD-10-CM

## 2022-05-17 PROCEDURE — G1004 CDSM NDSC: HCPCS

## 2022-05-17 PROCEDURE — A9585 GADOBUTROL INJECTION: HCPCS | Performed by: PHYSICIAN ASSISTANT

## 2022-05-17 PROCEDURE — 70553 MRI BRAIN STEM W/O & W/DYE: CPT

## 2022-05-17 RX ADMIN — GADOBUTROL 9 ML: 604.72 INJECTION INTRAVENOUS at 11:03

## 2022-06-20 ENCOUNTER — OFFICE VISIT (OUTPATIENT)
Dept: NEUROSURGERY | Facility: CLINIC | Age: 77
End: 2022-06-20
Payer: COMMERCIAL

## 2022-06-20 VITALS
BODY MASS INDEX: 28.44 KG/M2 | TEMPERATURE: 97.7 F | DIASTOLIC BLOOD PRESSURE: 70 MMHG | HEART RATE: 49 BPM | SYSTOLIC BLOOD PRESSURE: 128 MMHG | WEIGHT: 210 LBS | RESPIRATION RATE: 16 BRPM | HEIGHT: 72 IN

## 2022-06-20 DIAGNOSIS — Z01.812 PRE-PROCEDURAL LABORATORY EXAMINATION: ICD-10-CM

## 2022-06-20 DIAGNOSIS — D33.0 BENIGN NEOPLASM OF SUPRATENTORIAL REGION OF BRAIN (HCC): ICD-10-CM

## 2022-06-20 DIAGNOSIS — D32.9 MENINGIOMA (HCC): Primary | ICD-10-CM

## 2022-06-20 PROCEDURE — 99214 OFFICE O/P EST MOD 30 MIN: CPT | Performed by: NURSE PRACTITIONER

## 2022-06-20 NOTE — ASSESSMENT & PLAN NOTE
· As addressed in HPI  · Intracranial frontal meningioma formal DX in , 1 year f/u visit as per NCCN guidelines completed serial every 3 month f/u visits  · Eye problems secondary to DM2, HgA1c  8  1  visula disturbance and  HO keratoconjunctivitis sicca  · Nonfocal neurological examination  · Denies HA, dizziness, light headedness  · N/T in lower extremities  Reports is treating with gabapentin prescribed by endocrinologist  Dahiana Dunne for diabetic peripheral neuropathy   · Reports memory deficit is forgetful, is managed y neurology   He recently completed MRI Brain NeuroQuant  · Expresses concern over meningioma, reports wife had glioma ,  about 4 - 5 months after dx  Imagining   22 MRI brain w/wo contrast Once again identified is a homogeneously enhancing extra-axial mass within the right superior lateral frontal vertex measuring 1 6 cm in maximum dimension on series 10 image 21, grossly unchanged  Plan   · Reviewed CT head without contrast imaging with patient and daughter  · Educated on the natural nature of meningioma and surveillance recommendations as per NCCN guidelines  · As per  NCCN in guidelines brain MRI with contrast next due 1 year  · Ordered MRI imaging w /wo contrast , complete labs 1-2 week prior to assess renal function  · Advise If she has additional questions or concerns encouraged to contact the neurosurgery office immediately  · AVS education meningioma and alert signs and symptoms that warrant calling neurosurgery for f/u appointment sooner or going to emergency room

## 2022-06-20 NOTE — PATIENT INSTRUCTIONS
Signs/Symptoms frontal Lobe Meningioma  Changes in vision, such as seeing double or blurriness  Headaches, especially those that are worse in the morning  Hearing loss or ringing in the ears  Memory loss  Loss of smell  Seizures  Weakness in your arms or legs  Language difficulty  If patient develops any of the above system symptoms and in your judgment they are severe go immediately to your closest emergency room via 911  If symptoms are not deemed as severe you call Neurosurgery office immediately and we will schedule a follow-up appointment and an updated MRI image  If he develops additional questions or concerns after the appointment do not hesitate to call the Neurosurgery office  An updated MRI will be ordered for follow-up in 1 year the  will schedule that MRI  Ordering lab work today for MRI with contrast to assess renal function/kidney please complete within 1-2 weeks prior to MRI in 2023

## 2022-06-20 NOTE — PROGRESS NOTES
Assessment/Plan:    Meningioma ([facility] [address] )  · As addressed in HPI  · Intracranial frontal meningioma formal DX in , 1 year f/u visit as per NCCN guidelines completed serial every 3 month f/u visits  · Eye problems secondary to DM2, HgA1c  8  1  visula disturbance and  HO keratoconjunctivitis sicca  · Nonfocal neurological examination  · Denies HA, dizziness, light headedness  · N/T in lower extremities  Reports is treating with gabapentin prescribed by endocrinologist  Abdulkadir Hawk for diabetic peripheral neuropathy   · Reports memory deficit is forgetful, is managed y neurology   He recently completed MRI Brain NeuroQuant  · Expresses concern over meningioma, reports wife had glioma ,  about 4 - 5 months after dx  Imagining   22 MRI brain w/wo contrast Once again identified is a homogeneously enhancing extra-axial mass within the right superior lateral frontal vertex measuring 1 6 cm in maximum dimension on series 10 image 21, grossly unchanged  Plan   · Reviewed CT head without contrast imaging with patient and daughter  · Educated on the natural nature of meningioma and surveillance recommendations as per NCCN guidelines  · As per  NCCN in guidelines brain MRI with contrast next due 1 year  · Ordered MRI imaging w /wo contrast , complete labs 1-2 week prior to assess renal function  · Advise If she has additional questions or concerns encouraged to contact the neurosurgery office immediately  · AVS education meningioma and alert signs and symptoms that warrant calling neurosurgery for f/u appointment sooner or going to emergency room   Subjective: Has it gotten larger? What do I need to watch for     Patient ID: Jeromy Maddox is a 78 y o  male DM, PVD, peripheral neuropathy, HTN, thoracici aortic aneurysm and stroke  HPI   Presents  for 1 year f/u visit  frontal meningioma      Daughter reports Meningioma on imagining dating back in  , finally addressed in July 2020  MRI Brain -7/21/2019 - There is T2 intermediate signal extra-axial lesion overlying the right frontal lobe measuring 1 2 x 0 8 cm (series 6 image 19)  Only in retrospective it might possibly be appreciated as a punctuate focus on series 6 image 20 on prior exam 9/14/2015  There is no subjacent parenchymal edema  I have spent 30 minutes with the patient today in which greater than 50% of this time was spent in assessment, examination, impressions, reviewing imagining and recommendations for care  All questions were answered to his/her satisfaction, and contact information provided in the event additional questions arise  Patient acknowledged an understanding and agreement with plan             REVIEW OF SYSTEMS  Review of Systems   Constitutional: Positive for fatigue (mild)  HENT: Negative  Eyes: Positive for visual disturbance (sometimes)  Dry eyes  Some visual disturbances, states 2/2 diabetes   Respiratory: Negative  Cardiovascular: Negative  Gastrointestinal: Negative  Endocrine: Negative  Genitourinary: Negative  Musculoskeletal: Negative  Skin: Negative  Allergic/Immunologic: Negative  Neurological: Positive for dizziness (sometimes), light-headedness (sometimes), numbness (N/T intermittent left leg) and headaches (1-2x monthly, had one yesterday)  Negative for tremors, seizures, speech difficulty and weakness  Memory difficulties   Hematological: Bruises/bleeds easily  Psychiatric/Behavioral: Positive for decreased concentration (severe forgetfulness) and sleep disturbance (sometimes)  All other systems reviewed and are negative          Meds/Allergies     Current Outpatient Medications   Medication Sig Dispense Refill    amLODIPine (NORVASC) 10 mg tablet every morning        aspirin (ECOTRIN LOW STRENGTH) 81 mg EC tablet Take 1 tablet by mouth daily      D3-50 1 25 MG (96797 UT) capsule Take 50,000 Units by mouth once a week      gabapentin (NEURONTIN) 300 mg capsule Take 1 capsule (300 mg total) by mouth 2 (two) times a day 60 capsule 6    glipiZIDE (GLUCOTROL XL) 2 5 mg 24 hr tablet Take 1 tablet by mouth daily      Jardiance 10 MG TABS Take 10 mg by mouth daily      losartan (COZAAR) 100 MG tablet take 1 tablet by mouth once daily 90 tablet 3    Lumigan 0 01 % ophthalmic drops instill 1 drop into both eyes once daily at bedtime      metoprolol succinate (TOPROL-XL) 50 mg 24 hr tablet Take 1 tablet (50 mg total) by mouth 2 (two) times a day 180 tablet 3    ONETOUCH DELICA LANCETS 62R MISC use ONE LANCET twice a day  0    ONETOUCH VERIO test strip TEST twice a day  0    pravastatin (PRAVACHOL) 10 mg tablet Take 10 mg by mouth daily      sildenafil (VIAGRA) 50 MG tablet Take 50 mg by mouth daily as needed        sitaGLIPtin (JANUVIA) 100 mg tablet Take 1 tablet by mouth daily       No current facility-administered medications for this visit  No Known Allergies    PAST HISTORY    Past Medical History:   Diagnosis Date    Anxiety     Bradycardia     Cerebral infarction ([facility] [address] )     Diabetes mellitus ([facility] [address] )     Diabetic neuropathy (HCC)     Dilated aortic root (HCC)     Essential hypertension     Exercise-induced shortness of breath     Hypertension        Past Surgical History:   Procedure Laterality Date    APPENDECTOMY         Social History     Tobacco Use    Smoking status: Former Smoker     Types: Cigarettes     Quit date:      Years since quittin 5    Smokeless tobacco: Never Used   Vaping Use    Vaping Use: Never used   Substance Use Topics    Alcohol use:  Yes     Alcohol/week: 3 0 - 4 0 standard drinks     Types: 1 - 2 Cans of beer, 1 Glasses of wine, 1 Shots of liquor per week     Comment: occasionally    Drug use: Not Currently     Types: Marijuana     Comment: Quit in        Family History   Problem Relation Age of Onset    Ovarian cancer Mother     Cancer Sister     No Known Problems Father     No Known Problems Brother     No Known Problems Maternal Grandmother     No Known Problems Maternal Grandfather     No Known Problems Paternal Grandmother     No Known Problems Paternal Grandfather     No Known Problems Brother     No Known Problems Sister     No Known Problems Sister     No Known Problems Son     No Known Problems Son     No Known Problems Daughter     No Known Problems Daughter     No Known Problems Daughter        The following portions of the patient's history were reviewed and updated as appropriate: allergies, current medications, past family history, past medical history, past social history, past surgical history and problem list       EXAM    Vitals:Blood pressure 128/70, pulse (!) 49, temperature 97 7 °F (36 5 °C), temperature source Tympanic, resp  rate 16, height 5' 11 5" (1 816 m), weight 95 3 kg (210 lb)  ,Body mass index is 28 88 kg/m²  Physical Exam  Vitals and nursing note reviewed  Exam conducted with a chaperone present (daughter)  Constitutional:       General: He is not in acute distress  Appearance: Normal appearance  He is normal weight  He is not ill-appearing, toxic-appearing or diaphoretic  Eyes:      General: No scleral icterus  Right eye: No discharge  Left eye: No discharge  Conjunctiva/sclera: Conjunctivae normal    Pulmonary:      Effort: Pulmonary effort is normal  No respiratory distress  Musculoskeletal:      Cervical back: Normal range of motion  Right lower leg: No edema  Left lower leg: No edema  Skin:     General: Skin is warm and dry  Neurological:      General: No focal deficit present  Mental Status: He is alert and oriented to person, place, and time  Cranial Nerves: No cranial nerve deficit  Sensory: No sensory deficit  Motor: No weakness  Coordination: Coordination normal       Gait: Gait is intact   Gait normal       Deep Tendon Reflexes: Strength normal    Psychiatric:         Mood and Affect: Mood normal          Behavior: Behavior normal          Neurologic Exam     Mental Status   Oriented to person, place, and time  Level of consciousness: alert    Motor Exam   Muscle bulk: normal  Right arm tone: normal  Left arm tone: normal  Right leg tone: normal  Left leg tone: normal    Strength   Strength 5/5 throughout  Sensory Exam   Right arm light touch: normal  Left arm light touch: normal  Right leg light touch: normal  Left leg light touch: normal    Gait, Coordination, and Reflexes     Gait  Gait: normal      Imaging Studies  No results found  5/17/2022 MRI BRAIN WITH AND WITHOUT CONTRAST     INDICATION: D33 0: Benign neoplasm of brain, supratentorial      COMPARISON:  4/29/2022, 5/17/2021      TECHNIQUE:  Sagittal T1, axial T2, axial FLAIR, axial T1, axial [place], axial diffusion  Sagittal, axial T1 postcontrast   Axial bravo postcontrast with coronal reconstructions           IV Contrast:  9 mL of Gadobutrol injection (SINGLE-DOSE)      IMAGE QUALITY:   Diagnostic      FINDINGS:     BRAIN PARENCHYMA:  Once again identified is a homogeneously enhancing extra-axial mass within the right superior lateral frontal vertex measuring 1 6 cm in maximum dimension on series 10 image 21, grossly unchanged      Small focus of encephalomalacia within the left superior lateral frontal cortex and underlying juxtacortical white matter is unchanged        Small scattered hyperintensities on T2/FLAIR imaging are noted in the periventricular and subcortical white matter demonstrating an appearance that is statistically most likely to represent mild microangiopathic change  Small focus of encephalomalacia   within the right cerebellum is unchanged   Small old lacunar infarcts within the basal ganglia         Postcontrast imaging of the remainder of the brain parenchyma is unremarkable      VENTRICLES:  Mildly asymmetric lateral ventricles with no ventriculomegaly      SELLA AND PITUITARY GLAND:  Normal      ORBITS:  Normal      PARANASAL SINUSES:  Normal      VASCULATURE:  Tortuosity of the distal vertebral arteries  The tortuous left vertebral artery results in mild mass effect upon the ventral aspect of the inferior brainstem, unchanged      CALVARIUM AND SKULL BASE:  Normal      EXTRACRANIAL SOFT TISSUES:  Normal      IMPRESSION:     Stable chronic microangiopathic change and small old lacunar infarcts  No hemorrhage or acute ischemia      Stable small meningioma within the right superior lateral frontal vertex      Tortuosity of the distal vertebral arteries  The left V4 segment demonstrates mass effect upon the ventral aspect of the inferior brainstem, unchanged from the prior examination      Workstation performed: [id number]         I have personally reviewed pertinent reports     and I have personally reviewed pertinent films in PACS

## 2022-07-14 DIAGNOSIS — I10 HYPERTENSION, ESSENTIAL: ICD-10-CM

## 2022-07-14 RX ORDER — METOPROLOL SUCCINATE 50 MG/1
TABLET, EXTENDED RELEASE ORAL
Qty: 180 TABLET | Refills: 3 | Status: SHIPPED | OUTPATIENT
Start: 2022-07-14

## 2022-08-19 ENCOUNTER — TELEPHONE (OUTPATIENT)
Dept: NEUROLOGY | Facility: CLINIC | Age: 77
End: 2022-08-19

## 2022-08-22 DIAGNOSIS — E11.42 DIABETIC PERIPHERAL NEUROPATHY (HCC): ICD-10-CM

## 2022-08-23 ENCOUNTER — OFFICE VISIT (OUTPATIENT)
Dept: NEUROLOGY | Facility: CLINIC | Age: 77
End: 2022-08-23
Payer: COMMERCIAL

## 2022-08-23 VITALS
HEIGHT: 72 IN | BODY MASS INDEX: 28.04 KG/M2 | HEART RATE: 57 BPM | WEIGHT: 207 LBS | DIASTOLIC BLOOD PRESSURE: 70 MMHG | SYSTOLIC BLOOD PRESSURE: 102 MMHG

## 2022-08-23 DIAGNOSIS — I10 HYPERTENSION, ESSENTIAL: ICD-10-CM

## 2022-08-23 DIAGNOSIS — D32.9 MENINGIOMA (HCC): ICD-10-CM

## 2022-08-23 DIAGNOSIS — R41.3 MEMORY DIFFICULTY: ICD-10-CM

## 2022-08-23 DIAGNOSIS — E11.42 DIABETIC PERIPHERAL NEUROPATHY (HCC): ICD-10-CM

## 2022-08-23 DIAGNOSIS — Z86.73 HISTORY OF CVA (CEREBROVASCULAR ACCIDENT): Primary | ICD-10-CM

## 2022-08-23 PROCEDURE — 99214 OFFICE O/P EST MOD 30 MIN: CPT | Performed by: PSYCHIATRY & NEUROLOGY

## 2022-08-23 RX ORDER — MELOXICAM 15 MG/1
15 TABLET ORAL AS NEEDED
COMMUNITY
Start: 2022-08-10

## 2022-08-23 RX ORDER — GABAPENTIN 300 MG/1
CAPSULE ORAL
Qty: 60 CAPSULE | Refills: 6 | Status: SHIPPED | OUTPATIENT
Start: 2022-08-23

## 2022-08-23 NOTE — PROGRESS NOTES
Jeevan Bear is a 78 y o  male  Chief Complaint   Patient presents with    Cerebrovascular Accident       Assessment:  1  History of CVA (cerebrovascular accident)    2  Memory difficulty    3  Meningioma ([facility] [address] )    4  Hypertension, essential    5  Diabetic peripheral neuropathy ([facility] [address] )        Plan:  Continue with multivitamin a day  Continue with mentally stimulating exercises  Follow-up with cardiology  Follow-up in 6 months    Discussion:  Patient's MRI scan of the brain an EEG results were reviewed with him, does not show evidence of any neuro degeneration, EEG was unremarkable, his [place] is 25/30 I have advised him to continue with mentally stimulating exercises to keep his blood pressure cholesterol and sugar under control, to take safety precautions and to be careful with his driving  Stroke education given to the patient to continue with his aspirin, to go to the hospital if has any recurrence of stroke-like symptoms and call us his neuropathy seems to be stable on Neurontin  To go to the hospital if has any worsening symptoms and call us  Follow-up with the other physicians and see me back in 6 months  Subjective:    HPI   Patient is here in follow-up for his history of CVA, since his last visit he tells me that he is doing good he has not had any stroke-like symptoms his  short-term memory is stable, he is able to manages finances and ADLs does not have any difficulty with driving, he has been complaining of lightheadedness, numbness and tingling in both upper and lower extremities is unremarkable    Appetite is good weight has been stable no other complaints    Vitals:    08/23/22 1304   BP: 102/70   BP Location: Left arm   Patient Position: Sitting   Cuff Size: Adult   Pulse: 57       Current Medications    Current Outpatient Medications:     amLODIPine (NORVASC) 10 mg tablet, every morning  , Disp: , Rfl:     aspirin (ECOTRIN LOW STRENGTH) 81 mg EC tablet, Take 1 tablet by mouth daily, Disp: , Rfl:     D3-50 1 25 MG (35004 UT) capsule, Take 50,000 Units by mouth once a week, Disp: , Rfl:     gabapentin (NEURONTIN) 300 mg capsule, take 1 capsule by mouth twice a day, Disp: 60 capsule, Rfl: 6    glipiZIDE (GLUCOTROL XL) 2 5 mg 24 hr tablet, Take 1 tablet by mouth daily, Disp: , Rfl:     Jardiance 10 MG TABS, Take 10 mg by mouth daily, Disp: , Rfl:     losartan (COZAAR) 100 MG tablet, take 1 tablet by mouth once daily, Disp: 90 tablet, Rfl: 3    Lumigan 0 01 % ophthalmic drops, instill 1 drop into both eyes once daily at bedtime, Disp: , Rfl:     meloxicam (MOBIC) 15 mg tablet, Take 15 mg by mouth if needed, Disp: , Rfl:     metoprolol succinate (TOPROL-XL) 50 mg 24 hr tablet, take 1 tablet by mouth twice a day, Disp: 180 tablet, Rfl: 3    ONETOUCH DELICA LANCETS 26P MISC, use ONE LANCET twice a day, Disp: , Rfl: 0    ONETOUCH VERIO test strip, TEST twice a day, Disp: , Rfl: 0    pravastatin (PRAVACHOL) 10 mg tablet, Take 10 mg by mouth daily, Disp: , Rfl:     sildenafil (VIAGRA) 50 MG tablet, Take 50 mg by mouth daily as needed  , Disp: , Rfl:     sitaGLIPtin (JANUVIA) 100 mg tablet, Take 1 tablet by mouth daily, Disp: , Rfl:       Allergies  Patient has no known allergies      Past Medical History  Past Medical History:   Diagnosis Date    Anxiety     Bradycardia     Cerebral infarction ([facility] [address] [facility]     Diabetes mellitus ([facility] [address] )     [facility]     Dilated aortic root (HCC)     Essential hypertension     Exercise-induced shortness of breath     Hypertension          Past Surgical History:  Past Surgical History:   Procedure Laterality Date    APPENDECTOMY           Family History:  Family History   Problem Relation Age of Onset    Ovarian cancer Mother    Tanya Ballard Cancer Sister     No Known Problems Father     No Known Problems Brother     No Known Problems Maternal Grandmother     No Known Problems Maternal Grandfather     No Known Problems Paternal Grandmother     No Known Problems Paternal Grandfather     No Known Problems Brother     No Known Problems Sister     No Known Problems Sister     No Known Problems Son     No Known Problems Son     No Known Problems Daughter     No Known Problems Daughter     No Known Problems Daughter        Social History:   reports that he quit smoking about 52 years ago  His smoking use included cigarettes  He has never used smokeless tobacco  He reports current alcohol use of about 3 0 - 4 0 standard drinks of alcohol per week  He reports previous drug use  Drug: Marijuana  Review,  I have reviewed the past medical history, surgical history, social and family history, current medications, allergies vitals, review of systems, and updated this information as appropriate today  Objective:    Physical Exam    Neurological Exam    GENERAL:  Cooperative in no acute distress  Well-developed and well-nourished    HEAD and NECK   Head is atraumatic normocephalic with no lesions or masses  Neck is supple with full range of motion    CARDIOVASCULAR  Carotid Arteries-no carotid bruits  NEUROLOGIC:  Mental Status-the patient is awake alert and oriented without aphasia or apraxia, [place] is 25/30  Visual fields are full to confrontation  Extraocular movements are full without nystagmus  Pupils are 2-1/2 mm and reactive  Face is symmetrical to light touch  Movements of facial expression move symmetrically  Hearing is normal to finger rub bilaterally  Soft palate lifts symmetrically  Shoulder shrug is symmetrical  Tongue is midline without atrophy  Motor: No drift is noted on arm extension  Strength is full in the upper and lower extremities with normal bulk and tone    Gait reveals a normal base with symmetrical arm swing  ROS:  Review of Systems   Constitutional: Negative  Negative for appetite change and fever  HENT: Negative  Negative for hearing loss, tinnitus, trouble swallowing and voice change  Eyes: Negative    Negative for photophobia and pain  Respiratory: Negative  Negative for shortness of breath  Cardiovascular: Negative  Negative for palpitations  Gastrointestinal: Positive for nausea  Negative for vomiting  Endocrine: Negative  Negative for cold intolerance  Genitourinary: Negative  Negative for dysuria, frequency and urgency  Musculoskeletal: Negative  Negative for myalgias and neck pain  Skin: Negative  Negative for rash  Neurological: Positive for light-headedness  Negative for dizziness, tremors, seizures, syncope, facial asymmetry, speech difficulty, weakness, numbness and headaches  Hematological: Negative  Does not bruise/bleed easily  Psychiatric/Behavioral: Positive for confusion  Negative for hallucinations and sleep disturbance

## 2022-08-29 DIAGNOSIS — I10 HYPERTENSION, ESSENTIAL: ICD-10-CM

## 2022-08-31 RX ORDER — LOSARTAN POTASSIUM 100 MG/1
TABLET ORAL
Qty: 90 TABLET | Refills: 3 | Status: SHIPPED | OUTPATIENT
Start: 2022-08-31

## 2022-09-18 ENCOUNTER — HOSPITAL ENCOUNTER (OUTPATIENT)
Dept: CT IMAGING | Facility: HOSPITAL | Age: 77
Discharge: HOME | End: 2022-09-18
Attending: INTERNAL MEDICINE
Payer: COMMERCIAL

## 2022-09-18 DIAGNOSIS — I71.20 THORACIC AORTIC ANEURYSM WITHOUT RUPTURE: ICD-10-CM

## 2022-09-18 PROCEDURE — 71250 CT THORAX DX C-: CPT

## 2022-09-18 PROCEDURE — G1004 CDSM NDSC: HCPCS

## 2022-11-29 ENCOUNTER — TELEPHONE (OUTPATIENT)
Dept: NEUROLOGY | Facility: CLINIC | Age: 77
End: 2022-11-29

## 2022-11-29 NOTE — TELEPHONE ENCOUNTER
Received a Care Considerations letter from [facility] would like Dr Spears Patient to consider adding a statin to the patient's medication regimen      Letter has been scanned into patient's chart

## 2023-02-17 ENCOUNTER — TELEPHONE (OUTPATIENT)
Dept: NEUROLOGY | Facility: CLINIC | Age: 78
End: 2023-02-17

## 2023-06-20 ENCOUNTER — TELEPHONE (OUTPATIENT)
Dept: NEUROSURGERY | Facility: CLINIC | Age: 78
End: 2023-06-20

## 2023-06-20 ENCOUNTER — APPOINTMENT (OUTPATIENT)
Dept: LAB | Facility: HOSPITAL | Age: 78
End: 2023-06-20
Payer: COMMERCIAL

## 2023-06-20 DIAGNOSIS — Z01.812 PRE-PROCEDURAL LABORATORY EXAMINATION: ICD-10-CM

## 2023-06-20 LAB
BUN SERPL-MCNC: 16 MG/DL (ref 5–25)
CREAT SERPL-MCNC: 1.32 MG/DL (ref 0.6–1.3)
GFR SERPL CREATININE-BSD FRML MDRD: 52 ML/MIN/1.73SQ M

## 2023-06-20 PROCEDURE — 82565 ASSAY OF CREATININE: CPT

## 2023-06-20 PROCEDURE — 36415 COLL VENOUS BLD VENIPUNCTURE: CPT

## 2023-06-20 PROCEDURE — 84520 ASSAY OF UREA NITROGEN: CPT

## 2023-06-20 NOTE — TELEPHONE ENCOUNTER
Called patient and made him aware to go for the bloodwork sometime today is possible so it does not delay his MRI tomorrow  He said he will go to the [facility] today and have it completed

## 2023-06-20 NOTE — TELEPHONE ENCOUNTER
----- Message from Janki Park sent at 6/20/2023  8:53 AM EDT -----  Regarding: Labs for MRI  Hi Ursula Morales needs a bun/cre ordered for his MRI because his current labs are out of date  Please notify pt when order is placed  He needs to get these in order to have his MRI   Thanks

## 2023-06-21 ENCOUNTER — HOSPITAL ENCOUNTER (OUTPATIENT)
Dept: MRI IMAGING | Facility: HOSPITAL | Age: 78
Discharge: HOME | End: 2023-06-21
Payer: COMMERCIAL

## 2023-06-21 DIAGNOSIS — D32.9 MENINGIOMA (HCC): ICD-10-CM

## 2023-06-21 PROCEDURE — 70553 MRI BRAIN STEM W/O & W/DYE: CPT

## 2023-06-21 PROCEDURE — A9585 GADOBUTROL INJECTION: HCPCS | Performed by: NURSE PRACTITIONER

## 2023-06-21 PROCEDURE — G1004 CDSM NDSC: HCPCS

## 2023-06-21 RX ADMIN — GADOBUTROL 9 ML: 604.72 INJECTION INTRAVENOUS at 14:24

## 2023-06-26 ENCOUNTER — OFFICE VISIT (OUTPATIENT)
Dept: NEUROSURGERY | Facility: CLINIC | Age: 78
End: 2023-06-26
Payer: COMMERCIAL

## 2023-06-26 VITALS
WEIGHT: 213 LBS | HEIGHT: 72 IN | SYSTOLIC BLOOD PRESSURE: 122 MMHG | RESPIRATION RATE: 16 BRPM | DIASTOLIC BLOOD PRESSURE: 72 MMHG | OXYGEN SATURATION: 96 % | BODY MASS INDEX: 28.85 KG/M2 | TEMPERATURE: 97.7 F | HEART RATE: 51 BPM

## 2023-06-26 DIAGNOSIS — D32.9 MENINGIOMA (HCC): Primary | ICD-10-CM

## 2023-06-26 PROCEDURE — 99213 OFFICE O/P EST LOW 20 MIN: CPT | Performed by: NURSE PRACTITIONER

## 2023-06-26 NOTE — ASSESSMENT & PLAN NOTE
Presents for annual surveillance of right lateral frontal convexity 1 5 cm meningioma  · Discovered incidentally on imaging completed by Dr Frank Dykes (neurology) for work up of dizziness  · C/o ongoing headaches  · Exam is non-focal     Imaging:  · MRI brain w/wo, 6/21/2023: Stable extra-axial mass along the right lateral frontal convexity measures 1 6 x 0 9 cm compatible with meningioma  This is stable in size when compared to prior from November 6, 2020  White matter changes suggestive of chronic microangiopathy  No acute intracranial pathology  Plan:  · Reviewed imaging extensively with patient and bebo Schroeder  · Discussed that finding has appeared stable over a period of 3 years and this is reassuring  · Per NCCN guidelines, recommend continued surveillance x 5 years  · Follow up in 1 year with MRI brain w/wo  · If stable at next appointment, can consider discontinuing surveillance

## 2023-06-26 NOTE — PROGRESS NOTES
Neurosurgery Office Note  Nathalie Vasquez 78 y o  male MRN: [id number]      Assessment/Plan     Meningioma [facility])  Presents for annual surveillance of right lateral frontal convexity 1 5 cm meningioma  · Discovered incidentally on imaging completed by Dr Mary Hope (neurology) for work up of dizziness  · C/o ongoing headaches  · Exam is non-focal     Imaging:  · MRI brain w/wo, 6/21/2023: Stable extra-axial mass along the right lateral frontal convexity measures 1 6 x 0 9 cm compatible with meningioma  This is stable in size when compared to prior from November 6, 2020  White matter changes suggestive of chronic microangiopathy  No acute intracranial pathology  Plan:  · Reviewed imaging extensively with patient and bebo Schroeder  · Discussed that finding has appeared stable over a period of 3 years and this is reassuring  · Per NCCN guidelines, recommend continued surveillance x 5 years  · Follow up in 1 year with MRI brain w/wo  · If stable at next appointment, can consider discontinuing surveillance  Diagnoses and all orders for this visit:    Meningioma ([facility] [address] )  -     MRI brain w wo contrast; Future  -     BUN/Creatinine Ratio; Future          I have spent a total time of 30 minutes on 06/26/23 in caring for this patient including Diagnostic results, Instructions for management, Patient and family education, Impressions, Counseling / Coordination of care, Documenting in the medical record, Reviewing / ordering tests, medicine, procedures   and Obtaining or reviewing history    CHIEF COMPLAINT    Chief Complaint   Patient presents with   • Follow-up     1 yr follow up, with MRI       HISTORY    History of Present Illness     78y o  year old male     With past medical history of diabetes, diabetes neuropathy, hypertension, previous CVA, who presents for annual surveillance of 1 5 cm right lateral frontal convexity meningioma    This was discovered incidentally in 2022 when he underwent MRI of the brain as part of work-up for ongoing dizziness with Dr Woody Sifuentes  Since that time finding has remained stable over serial imaging  Patient continues to endorse occasional headaches  He is unclear on why he has been experiencing headaches  Denies any visual disturbance  Denies any numbness or weakness  Denies any seizures  He is accompanied today by his Linda Schumacher  He is retired and lives at home with Rizwana Somers but Rizwana Somers is still working  See Discussion    REVIEW OF SYSTEMS    Review of Systems   Constitutional: Positive for fatigue (sometimes)  HENT: Negative  Eyes: Negative for pain and visual disturbance  Respiratory: Negative for chest tightness and shortness of breath  Cardiovascular: Negative  Gastrointestinal: Negative  Genitourinary: Negative  Musculoskeletal: Negative for gait problem  Skin: Negative  Neurological: Positive for dizziness (occasional), light-headedness (occasional) and headaches (almost daily, mild, brief)  Negative for seizures, speech difficulty, weakness and numbness  Hematological: Does not bruise/bleed easily  Psychiatric/Behavioral: Negative for confusion, decreased concentration and dysphoric mood  The patient is not nervous/anxious  ASA 81         ROS obtained by MA  Reviewed  See HPI       Meds/Allergies     Current Outpatient Medications   Medication Sig Dispense Refill   • amLODIPine (NORVASC) 10 mg tablet every morning       • aspirin (ECOTRIN LOW STRENGTH) 81 mg EC tablet Take 1 tablet by mouth daily     • D3-50 1 25 MG (14121 UT) capsule Take 50,000 Units by mouth once a week     • gabapentin (NEURONTIN) 300 mg capsule Take 1 capsule (300 mg total) by mouth 2 (two) times a day 60 capsule 11   • glipiZIDE (GLUCOTROL XL) 2 5 mg 24 hr tablet Take 1 tablet by mouth daily     • Jardiance 10 MG TABS Take 10 mg by mouth daily     • losartan (COZAAR) 100 MG tablet take 1 tablet by mouth once daily 90 tablet 3   • Lumigan 0 01 % ophthalmic drops instill 1 drop into both eyes once daily at bedtime     • meloxicam (MOBIC) 15 mg tablet Take 15 mg by mouth if needed     • metoprolol succinate (TOPROL-XL) 50 mg 24 hr tablet take 1 tablet by mouth twice a day 180 tablet 3   • pravastatin (PRAVACHOL) 10 mg tablet Take 10 mg by mouth daily     • sildenafil (VIAGRA) 50 MG tablet Take 50 mg by mouth daily as needed       • sitaGLIPtin (JANUVIA) 100 mg tablet Take 1 tablet by mouth daily     • ONETOUCH DELICA LANCETS 64P MISC use ONE LANCET twice a day  0   • ONETOUCH VERIO test strip TEST twice a day  0     No current facility-administered medications for this visit  No Known Allergies    PAST HISTORY    Past Medical History:   Diagnosis Date   • Anxiety    • Bradycardia    • Cerebral infarction ([facility] )    • Diabetes mellitus ([facility] )    • Diabetic neuropathy ([facility] )    • Dilated aortic root (HCC)    • Essential hypertension    • Exercise-induced shortness of breath    • Hypertension        Past Surgical History:   Procedure Laterality Date   • APPENDECTOMY         Social History     Tobacco Use   • Smoking status: Former     Types: Cigarettes     Quit date:      Years since quittin 5   • Smokeless tobacco: Never   Vaping Use   • Vaping Use: Never used   Substance Use Topics   • Alcohol use:  Yes     Alcohol/week: 3 0 - 4 0 standard drinks of alcohol     Types: 1 - 2 Cans of beer, 1 Glasses of wine, 1 Shots of liquor per week     Comment: occasionally   • Drug use: Not Currently     Types: Marijuana     Comment: Quit in        Family History   Problem Relation Age of Onset   • Ovarian cancer Mother    • Cancer Sister    • No Known Problems Father    • No Known Problems Brother    • No Known Problems Maternal Grandmother    • No Known Problems Maternal Grandfather    • No Known Problems Paternal Grandmother    • No Known Problems Paternal Grandfather    • No Known Problems Brother    • No Known Problems Sister    • No Known Problems Sister "  • No Known Problems Son    • No Known Problems Son    • No Known Problems Daughter    • No Known Problems Daughter    • No Known Problems Daughter          Above history personally reviewed  EXAM    Vitals:Blood pressure 122/72, pulse (!) 51, temperature 97 7 °F (36 5 °C), temperature source Tympanic, resp  rate 16, height 5' 11 5\" (1 816 m), weight 96 6 kg (213 lb), SpO2 96 %  ,Body mass index is 29 29 kg/m²  Physical Exam  Constitutional:       General: He is not in acute distress  Appearance: He is well-developed  He is not diaphoretic  Eyes:      General:         Right eye: No discharge  Left eye: No discharge  Extraocular Movements: EOM normal       Conjunctiva/sclera: Conjunctivae normal       Pupils: Pupils are equal, round, and reactive to light  Pulmonary:      Effort: Pulmonary effort is normal  No respiratory distress  Abdominal:      General: Bowel sounds are normal  There is no distension  Palpations: Abdomen is soft  Tenderness: There is no abdominal tenderness  Musculoskeletal:         General: Normal range of motion  Cervical back: Normal range of motion and neck supple  Skin:     General: Skin is warm and dry  Neurological:      General: No focal deficit present  Mental Status: He is alert and oriented to person, place, and time  Mental status is at baseline  Cranial Nerves: Cranial nerves 2-12 are intact  No cranial nerve deficit  Sensory: No sensory deficit  Motor: No weakness  Coordination: Coordination normal  Finger-Nose-Finger Test normal       Deep Tendon Reflexes: Reflexes normal    Psychiatric:         Speech: Speech normal          Behavior: Behavior normal          Thought Content: Thought content normal          Judgment: Judgment normal          Neurologic Exam     Mental Status   Oriented to person, place, and time  Oriented to person  Oriented to place  Oriented to time   Oriented to year, month and " date    Registration: recalls 3 of 3 objects  Attention: normal  Concentration: normal    Speech: speech is normal   Level of consciousness: alert  Knowledge: good and consistent with education  Able to name object  Cranial Nerves   Cranial nerves II through XII intact  CN III, IV, VI   Pupils are equal, round, and reactive to light  Extraocular motions are normal    Right pupil: Size: 3 mm  Shape: regular  Reactivity: brisk  Consensual response: intact  Accommodation: intact  Left pupil: Size: 3 mm  Shape: regular  Reactivity: brisk  Consensual response: intact  Accommodation: intact  Nystagmus: none   Diplopia: none  Conjugate gaze: present    CN V   Right facial sensation deficit: none  Left facial sensation deficit: none    CN VII   Facial expression full, symmetric       CN VIII   Hearing: intact    CN IX, X   Palate: symmetric    CN XI   Right sternocleidomastoid strength: normal  Left sternocleidomastoid strength: normal  Right trapezius strength: normal  Left trapezius strength: normal    CN XII   Tongue: not atrophic  Fasciculations: absent  Tongue deviation: none    Motor Exam   Muscle bulk: normal  Overall muscle tone: normal  Right arm pronator drift: absent  Left arm pronator drift: absent    Strength   Right deltoid: 5/5  Left deltoid: 5/5  Right biceps: 5/5  Left biceps: 5/5  Right triceps: 5/5  Left triceps: 5/5  Right quadriceps: 5/5  Left quadriceps: 5/5  Right hamstrin/5  Left hamstrin/5  Right anterior tibial: 5/5  Left anterior tibial: 5/5  Right posterior tibial: 5/5  Left posterior tibial: 5/5  Right peroneal: 5/5  Left peroneal: 5/5  Right gastroc: 5/5  Left gastroc: 5/5    Sensory Exam   Light touch normal    Proprioception normal      Gait, Coordination, and Reflexes     Coordination   Finger to nose coordination: normal    Tremor   Resting tremor: absent  Intention tremor: absent  Action tremor: absent        MEDICAL DECISION MAKING    Imaging Studies:     MRI brain with and without contrast    Result Date: 6/22/2023  Narrative: MRI BRAIN WITH AND WITHOUT CONTRAST INDICATION: D32 9: Benign neoplasm of meninges, unspecified  COMPARISON:  None  TECHNIQUE: Multiplanar, multisequence imaging of the brain was performed before and after gadolinium administration  IV Contrast:  9 mL of Gadobutrol injection (SINGLE-DOSE) IMAGE QUALITY:   Diagnostic  FINDINGS: BRAIN PARENCHYMA: Encephalomalacia left posterior frontal lobe with chronic lacunar infarcts identified in the left frontal deep white matter, left caudate head, and right lentiform nucleus  Chronic lacunar infarct right cerebellum  Small scattered hyperintensities on T2/FLAIR imaging are noted in the periventricular and subcortical white matter demonstrating an appearance that is statistically most likely to represent mild microangiopathic change  1 6 x 0 9 cm extra-axial mass along the right lateral frontal convexity compatible with meningioma unchanged when compared to the prior study of November 6, 2020  No adjacent parenchymal edema  VENTRICLES:  Normal for the patient's age  SELLA AND PITUITARY GLAND:  Normal  ORBITS:  Normal  PARANASAL SINUSES:  Normal  VASCULATURE:  Evaluation of the major intracranial vasculature demonstrates appropriate flow voids  CALVARIUM AND SKULL BASE:  Normal  EXTRACRANIAL SOFT TISSUES:  Normal      Impression: Stable extra-axial mass along the right lateral frontal convexity measures 1 6 x 0 9 cm compatible with meningioma  This is stable in size when compared to prior from November 6, 2020  White matter changes suggestive of chronic microangiopathy  No acute intracranial pathology  Workstation performed: [id number]       I have personally reviewed pertinent reports     and I have personally reviewed pertinent films in PACS

## 2023-07-19 DIAGNOSIS — I10 HYPERTENSION, ESSENTIAL: ICD-10-CM

## 2023-07-19 RX ORDER — METOPROLOL SUCCINATE 50 MG/1
50 TABLET, EXTENDED RELEASE ORAL 2 TIMES DAILY
Qty: 180 TABLET | Refills: 1 | Status: SHIPPED | OUTPATIENT
Start: 2023-07-19

## 2023-07-19 NOTE — TELEPHONE ENCOUNTER
Patient is requesting med refill    Last Ov was 5/24/21 with      Patient were suppose to return around 6 months 11/24/21    Please schedule patient for a appointment

## 2023-08-25 DIAGNOSIS — I10 HYPERTENSION, ESSENTIAL: ICD-10-CM

## 2023-08-25 RX ORDER — LOSARTAN POTASSIUM 100 MG/1
TABLET ORAL
Qty: 90 TABLET | Refills: 3 | Status: SHIPPED | OUTPATIENT
Start: 2023-08-25

## 2023-10-05 ENCOUNTER — OFFICE VISIT (OUTPATIENT)
Dept: NEUROLOGY | Facility: CLINIC | Age: 78
End: 2023-10-05
Payer: COMMERCIAL

## 2023-10-05 ENCOUNTER — APPOINTMENT (OUTPATIENT)
Dept: LAB | Facility: CLINIC | Age: 78
End: 2023-10-05
Payer: COMMERCIAL

## 2023-10-05 VITALS
HEART RATE: 46 BPM | HEIGHT: 72 IN | BODY MASS INDEX: 28.42 KG/M2 | WEIGHT: 209.8 LBS | OXYGEN SATURATION: 97 % | DIASTOLIC BLOOD PRESSURE: 80 MMHG | SYSTOLIC BLOOD PRESSURE: 120 MMHG

## 2023-10-05 DIAGNOSIS — D33.0 BENIGN NEOPLASM OF SUPRATENTORIAL REGION OF BRAIN (HCC): ICD-10-CM

## 2023-10-05 DIAGNOSIS — D32.9 MENINGIOMA (HCC): ICD-10-CM

## 2023-10-05 DIAGNOSIS — Z86.73 HISTORY OF CVA (CEREBROVASCULAR ACCIDENT): Primary | ICD-10-CM

## 2023-10-05 DIAGNOSIS — R41.3 MEMORY DIFFICULTY: ICD-10-CM

## 2023-10-05 DIAGNOSIS — E11.42 DIABETIC PERIPHERAL NEUROPATHY (HCC): ICD-10-CM

## 2023-10-05 LAB
B BURGDOR IGG+IGM SER QL IA: NEGATIVE
FOLATE SERPL-MCNC: >22.3 NG/ML
TREPONEMA PALLIDUM IGG+IGM AB [PRESENCE] IN SERUM OR PLASMA BY IMMUNOASSAY: REACTIVE
TSH SERPL DL<=0.05 MIU/L-ACNC: 1.21 UIU/ML (ref 0.45–4.5)
VIT B12 SERPL-MCNC: 442 PG/ML (ref 180–914)

## 2023-10-05 PROCEDURE — 86592 SYPHILIS TEST NON-TREP QUAL: CPT

## 2023-10-05 PROCEDURE — 82607 VITAMIN B-12: CPT

## 2023-10-05 PROCEDURE — 36415 COLL VENOUS BLD VENIPUNCTURE: CPT

## 2023-10-05 PROCEDURE — 82746 ASSAY OF FOLIC ACID SERUM: CPT

## 2023-10-05 PROCEDURE — 86780 TREPONEMA PALLIDUM: CPT

## 2023-10-05 PROCEDURE — 84443 ASSAY THYROID STIM HORMONE: CPT

## 2023-10-05 PROCEDURE — 99215 OFFICE O/P EST HI 40 MIN: CPT | Performed by: PSYCHIATRY & NEUROLOGY

## 2023-10-05 PROCEDURE — 86618 LYME DISEASE ANTIBODY: CPT

## 2023-10-05 PROCEDURE — 86593 SYPHILIS TEST NON-TREP QUANT: CPT

## 2023-10-05 RX ORDER — RIVASTIGMINE 4.6 MG/24H
1 PATCH, EXTENDED RELEASE TRANSDERMAL DAILY
Qty: 30 PATCH | Refills: 0 | Status: SHIPPED | OUTPATIENT
Start: 2023-10-05

## 2023-10-05 RX ORDER — RIVASTIGMINE 9.5 MG/24H
1 PATCH, EXTENDED RELEASE TRANSDERMAL DAILY
Qty: 30 PATCH | Refills: 5 | Status: SHIPPED | OUTPATIENT
Start: 2023-10-05

## 2023-10-05 NOTE — PROGRESS NOTES
Manny Mcneal is a 78 y.o. male. Chief Complaint   Patient presents with   • History of CVA (cerebrovascular accident)   • Memory Loss       Assessment:  1. History of CVA (cerebrovascular accident)    2. Memory difficulty    3. Benign neoplasm of supratentorial region of brain ([address])    4. Diabetic peripheral neuropathy ([address])    5. Meningioma [facility])        Plan:  Patient with cognitive impairment could be secondary to his prior history of stroke with a current [place] of 19/30. Would recommend    Neuropsych testing. Refer for cognitive behavioral therapy. Exelon patch 4.6 mg per 24-hour for 1 month followed by 9.5 mg per 24-hour. Side effects discussed with the patient  Continue with aspirin and statin. To keep blood pressure cholesterol and sugar under control. To take fall and safety precautions and to be under supervision and avoid driving,  Continue with mentally stimulating exercises. Stroke education given to the patient. Target hemoglobin A1c less than 7 and LDL less than 70. Continue follow-up with neurosurgery regarding his history of meningioma. Patient on Neurontin being managed by the family physician for his diabetic neuropathy.   To go to the hospital if has any worsening symptoms and call me otherwise to see me back in 6 months or sooner if needed and follow-up with the other physicians        Subjective:    HPI   Patient is here in follow-up for his history of CVA, since his last visit he tells me that he is doing good except that he feels that his memory especially short-term is not that great he is able to drive without any issues his daughter manages his finances he lives with his girlfriend, he has short-term memory issues especially for words and names denies any strokelike symptoms he occasionally would have some mild bifrontal headaches no vision difficulties no speech difficulty his numbness and tingling in his upper and lower extremities is stable on Neurontin his appetite is good weight has been stable no other complaints. Vitals:    10/05/23 1039   BP: 120/80   BP Location: Left arm   Patient Position: Sitting   Cuff Size: Large   Pulse: (!) 46   SpO2: 97%   Weight: 95.2 kg (209 lb 12.8 oz)   Height: 5' 11.5" (1.816 m)       Current Medications    Current Outpatient Medications:   •  amLODIPine (NORVASC) 10 mg tablet, every morning  , Disp: , Rfl:   •  aspirin (ECOTRIN LOW STRENGTH) 81 mg EC tablet, Take 1 tablet by mouth daily, Disp: , Rfl:   •  D3-50 1.25 MG (44251 UT) capsule, Take 50,000 Units by mouth once a week, Disp: , Rfl:   •  gabapentin (NEURONTIN) 300 mg capsule, Take 1 capsule (300 mg total) by mouth 2 (two) times a day, Disp: 60 capsule, Rfl: 11  •  glipiZIDE (GLUCOTROL XL) 2.5 mg 24 hr tablet, Take 1 tablet by mouth daily, Disp: , Rfl:   •  Jardiance 10 MG TABS, Take 10 mg by mouth daily, Disp: , Rfl:   •  losartan (COZAAR) 100 MG tablet, take 1 tablet by mouth once daily, Disp: 90 tablet, Rfl: 3  •  Lumigan 0.01 % ophthalmic drops, instill 1 drop into both eyes once daily at bedtime, Disp: , Rfl:   •  meloxicam (MOBIC) 15 mg tablet, Take 15 mg by mouth if needed, Disp: , Rfl:   •  metoprolol succinate (TOPROL-XL) 50 mg 24 hr tablet, Take 1 tablet (50 mg total) by mouth 2 (two) times a day, Disp: 180 tablet, Rfl: 1  •  ONETOUCH DELICA LANCETS 11G MISC, use ONE LANCET twice a day, Disp: , Rfl: 0  •  ONETOUCH VERIO test strip, TEST twice a day, Disp: , Rfl: 0  •  pravastatin (PRAVACHOL) 10 mg tablet, Take 10 mg by mouth daily, Disp: , Rfl:   •  sildenafil (VIAGRA) 50 MG tablet, Take 50 mg by mouth daily as needed  , Disp: , Rfl:   •  sitaGLIPtin (JANUVIA) 100 mg tablet, Take 1 tablet by mouth daily, Disp: , Rfl:       Allergies  Patient has no known allergies.     Past Medical History  Past Medical History:   Diagnosis Date   • Anxiety    • Bradycardia    • Cerebral infarction [facility])    • Diabetes mellitus ([address])    • Diabetic neuropathy ([address])    • Dilated aortic root ([address]) • Essential hypertension    • Exercise-induced shortness of breath    • Hypertension          Past Surgical History:  Past Surgical History:   Procedure Laterality Date   • APPENDECTOMY           Family History:  Family History   Problem Relation Age of Onset   • Ovarian cancer Mother    • Cancer Sister    • No Known Problems Father    • No Known Problems Brother    • No Known Problems Maternal Grandmother    • No Known Problems Maternal Grandfather    • No Known Problems Paternal Grandmother    • No Known Problems Paternal Grandfather    • No Known Problems Brother    • No Known Problems Sister    • No Known Problems Sister    • No Known Problems Son    • No Known Problems Son    • No Known Problems Daughter    • No Known Problems Daughter    • No Known Problems Daughter        Social History:   reports that he quit smoking about 53 years ago. His smoking use included cigarettes. He has never used smokeless tobacco. He reports current alcohol use of about 3.0 - 4.0 standard drinks of alcohol per week. He reports that he does not currently use drugs after having used the following drugs: Marijuana. I have reviewed the past medical history, surgical history, social and family history, current medications, allergies vitals, review of systems, and updated this information as appropriate today. Objective:    Physical Exam    Neurological Exam     GENERAL:  Cooperative in no acute distress. Well-developed and well-nourished     HEAD and NECK   Head is atraumatic normocephalic with no lesions or masses. Neck is supple with full range of motion     CARDIOVASCULAR  Carotid Arteries-no carotid bruits. NEUROLOGIC:  Mental Status-the patient is awake alert and oriented without aphasia or apraxia, MoCA is 19/30  Cranial Nerves: Visual fields are full to confrontation. Visual acuity is 20/20 with hand-held chart. Extraocular movements are full without nystagmus. Pupils are 2-1/2 mm and reactive.  Face is symmetrical to light touch. Movements of facial expression move symmetrically. Hearing is normal to finger rub bilaterally. Soft palate lifts symmetrically. Shoulder shrug is symmetrical. Tongue is midline without atrophy. Motor: No drift is noted on arm extension. Strength is full in the upper and lower extremities with normal bulk and tone. Gait is unremarkable  No temporal artery tenderness    ROS:  Review of Systems   Constitutional: Negative for appetite change, fatigue and fever. HENT: Negative. Negative for hearing loss, tinnitus, trouble swallowing and voice change. Eyes: Negative. Negative for photophobia, pain and visual disturbance. Respiratory: Negative. Negative for shortness of breath. Cardiovascular: Negative. Negative for palpitations. Gastrointestinal: Negative. Negative for nausea and vomiting. Endocrine: Negative. Negative for cold intolerance. Genitourinary: Negative. Negative for dysuria, frequency and urgency. Musculoskeletal: Negative for back pain, gait problem, myalgias and neck pain. Skin: Negative. Negative for rash. Allergic/Immunologic: Negative. Neurological: Negative. Negative for dizziness, tremors, seizures, syncope, facial asymmetry, speech difficulty, weakness, light-headedness, numbness and headaches. Hematological: Negative. Does not bruise/bleed easily. Psychiatric/Behavioral: Positive for confusion. Negative for hallucinations and sleep disturbance.

## 2023-10-06 ENCOUNTER — TELEPHONE (OUTPATIENT)
Dept: NEUROLOGY | Facility: CLINIC | Age: 78
End: 2023-10-06

## 2023-10-06 LAB
RPR SER QL: REACTIVE
RPR SER-TITR: ABNORMAL {TITER}

## 2023-10-06 NOTE — TELEPHONE ENCOUNTER
Discussed with patient abnormal RPR on blood work results, advised the patient to follow-up with family physician and maybe get confirmatory test and follow-up with an ID.   Patient is agreeable

## 2023-10-06 NOTE — TELEPHONE ENCOUNTER
----- Message from Suri Rawls MD sent at 10/6/2023 10:00 AM EDT -----  Please make sure patient's family physician has this blood work results advised the patient to follow-up with the PCP

## 2023-10-07 LAB — T PALLIDUM AB SER QL IF: REACTIVE

## 2023-10-09 NOTE — TELEPHONE ENCOUNTER
Loye Canavan, MD  You 7 minutes ago (3:23 PM)       Sunshine with patient advised him to go on Exelon patch 4.6 mg per 24-hour for 1 month and then increasing it to 9.5 mg per 24-hour he already saw his family physician regarding abnormal RPR and FTA and is seeing an infectious disease physician

## 2023-10-10 ENCOUNTER — TELEPHONE (OUTPATIENT)
Dept: NEUROLOGY | Facility: CLINIC | Age: 78
End: 2023-10-10

## 2023-10-10 NOTE — TELEPHONE ENCOUNTER
----- Message from Tito Patel MD sent at 10/9/2023 10:17 AM EDT -----  Please have the patient follow-up with family physician regarding abnormal FTA antibody, he needs to discuss with the family physician and see an infectious disease physician and please make sure that the family physician have patient's blood work results, I had spoken to the patient couple of days back and advised him to follow-up with PCP but you please call him and tell him once again

## 2023-10-17 ENCOUNTER — OFFICE VISIT (OUTPATIENT)
Dept: CARDIOLOGY CLINIC | Facility: CLINIC | Age: 78
End: 2023-10-17
Payer: COMMERCIAL

## 2023-10-17 VITALS
HEART RATE: 52 BPM | DIASTOLIC BLOOD PRESSURE: 60 MMHG | SYSTOLIC BLOOD PRESSURE: 128 MMHG | HEIGHT: 71 IN | OXYGEN SATURATION: 98 % | WEIGHT: 210 LBS | BODY MASS INDEX: 29.4 KG/M2 | RESPIRATION RATE: 16 BRPM

## 2023-10-17 DIAGNOSIS — I71.21 ANEURYSM OF ASCENDING AORTA WITHOUT RUPTURE (HCC): ICD-10-CM

## 2023-10-17 DIAGNOSIS — E78.2 HYPERLIPEMIA, MIXED: ICD-10-CM

## 2023-10-17 DIAGNOSIS — I10 HYPERTENSION, ESSENTIAL: Primary | ICD-10-CM

## 2023-10-17 DIAGNOSIS — I25.10 CORONARY ARTERY DISEASE INVOLVING NATIVE CORONARY ARTERY OF NATIVE HEART WITHOUT ANGINA PECTORIS: ICD-10-CM

## 2023-10-17 PROCEDURE — 99214 OFFICE O/P EST MOD 30 MIN: CPT | Performed by: INTERNAL MEDICINE

## 2023-10-17 NOTE — PROGRESS NOTES
[facility] [place]  [place] [id number]  Cardiology Follow Up    Jaron Doty  1945  [id number]      1. Hypertension, essential        2. Aneurysm of ascending aorta without rupture ([address])        3. Hyperlipemia, mixed        4. Coronary artery disease involving native coronary artery of native heart without angina pectoris            Chief Complaint   Patient presents with    Follow-up       Interval History: Patient presents for follow-up visit. Patient denies any history of chest pain shortness of breath. Patient denies any history of leg edema or orthopnea PND. No history of presyncope syncope. Patient states compliance with the present list of medications. Patient has history of CVA, meningioma as well as cognitive difficulties/memory disturbances. Patient was evaluated by neurology. Patient was started on Exelon patch. Patient Active Problem List   Diagnosis    Hypertension, essential    Thoracic aortic aneurysm without rupture (HCC)    Cerebral infarction ([address])    Diabetic peripheral neuropathy (HCC)    History of CVA (cerebrovascular accident)    Dizziness and giddiness    Abnormal finding on MRI of brain    Benign neoplasm of supratentorial region of brain [facility])    CVA (cerebral vascular accident) ([address])    Meningioma ([address])    Memory difficulty     Past Medical History:   Diagnosis Date    Anxiety     Bradycardia     Cerebral infarction ([address])     Diabetes mellitus ([address])     Diabetic neuropathy ([address])     Dilated aortic root ([address])     Essential hypertension     Exercise-induced shortness of breath     Hypertension      Social History     Socioeconomic History    Marital status:       Spouse name: Not on file    Number of children: Not on file    Years of education: Not on file    Highest education level: Not on file   Occupational History    Occupation: Retired   Tobacco Use    Smoking status: Former     Types: Cigarettes     Quit date: 5     Years since quittin.8    Smokeless tobacco: Never   Vaping Use    Vaping Use: Never used   Substance and Sexual Activity    Alcohol use:  Yes     Alcohol/week: 3.0 - 4.0 standard drinks of alcohol     Types: 1 - 2 Cans of beer, 1 Glasses of wine, 1 Shots of liquor per week     Comment: occasionally    Drug use: Not Currently     Types: Marijuana     Comment: Quit in     Sexual activity: Not on file   Other Topics Concern    Not on file   Social History Narrative    Not on file     Social Determinants of Health     Financial Resource Strain: Not on file   Food Insecurity: Not on file   Transportation Needs: Not on file   Physical Activity: Not on file   Stress: Not on file   Social Connections: Not on file   Intimate Partner Violence: Not on file   Housing Stability: Not on file      Family History   Problem Relation Age of Onset    Ovarian cancer Mother     Cancer Sister     No Known Problems Father     No Known Problems Brother     No Known Problems Maternal Grandmother     No Known Problems Maternal Grandfather     No Known Problems Paternal Grandmother     No Known Problems Paternal Grandfather     No Known Problems Brother     No Known Problems Sister     No Known Problems Sister     No Known Problems Son     No Known Problems Son     No Known Problems Daughter     No Known Problems Daughter     No Known Problems Daughter      Past Surgical History:   Procedure Laterality Date    APPENDECTOMY         Current Outpatient Medications:     amLODIPine (NORVASC) 10 mg tablet, every morning  , Disp: , Rfl:     aspirin (ECOTRIN LOW STRENGTH) 81 mg EC tablet, Take 1 tablet by mouth daily, Disp: , Rfl:     D3-50 1.25 MG (36401 UT) capsule, Take 50,000 Units by mouth once a week, Disp: , Rfl:     gabapentin (NEURONTIN) 300 mg capsule, Take 1 capsule (300 mg total) by mouth 2 (two) times a day, Disp: 60 capsule, Rfl: 11    glipiZIDE (GLUCOTROL XL) 2.5 mg 24 hr tablet, Take 1 tablet by mouth daily, Disp: , Rfl:     Jardiance 10 MG TABS, Take 10 mg by mouth daily, Disp: , Rfl:     losartan (COZAAR) 100 MG tablet, take 1 tablet by mouth once daily, Disp: 90 tablet, Rfl: 3    Lumigan 0.01 % ophthalmic drops, instill 1 drop into both eyes once daily at bedtime, Disp: , Rfl:     meloxicam (MOBIC) 15 mg tablet, Take 15 mg by mouth if needed, Disp: , Rfl:     metoprolol succinate (TOPROL-XL) 50 mg 24 hr tablet, Take 1 tablet (50 mg total) by mouth 2 (two) times a day, Disp: 180 tablet, Rfl: 1    ONETOUCH DELICA LANCETS 85U MISC, use ONE LANCET twice a day, Disp: , Rfl: 0    ONETOUCH VERIO test strip, TEST twice a day, Disp: , Rfl: 0    pravastatin (PRAVACHOL) 10 mg tablet, Take 10 mg by mouth daily, Disp: , Rfl:     rivastigmine (EXELON) 4.6 mg/24 hr TD 24 hr patch, Place 1 patch on the skin over 24 hours daily, Disp: 30 patch, Rfl: 0    rivastigmine (EXELON) 9.5 mg/24 hr TD 24 hr patch, Place 1 patch on the skin over 24 hours daily, Disp: 30 patch, Rfl: 5    sildenafil (VIAGRA) 50 MG tablet, Take 50 mg by mouth daily as needed  , Disp: , Rfl:     sitaGLIPtin (JANUVIA) 100 mg tablet, Take 1 tablet by mouth daily, Disp: , Rfl:   No Known Allergies    Labs:  Appointment on 10/05/2023   Component Date Value    Lyme Total Antibodies 10/05/2023 Negative     Syphilis Total Antibody 10/05/2023 Reactive (A)     TSH 3RD GENERATON 10/05/2023 1. 211     Folate 10/05/2023 >22.3     Vitamin B-12 10/05/2023 442     RPR 10/05/2023 Reactive (A)     T pallidum Antibodies (F* 10/05/2023 Reactive (A)     RPR TITER 10/05/2023 Reactive 2 dils (A)      Imaging: No results found.     Review of Systems:  Review of Systems  REVIEW OF SYSTEMS:  Constitutional:  Denies fever or chills   Eyes:  Denies change in visual acuity   HENT:  Denies nasal congestion or sore throat   Respiratory:  Denies cough or shortness of breath   Cardiovascular:  Denies chest pain or edema   GI:  Denies abdominal pain, nausea, vomiting, bloody stools or diarrhea   : Denies dysuria, frequency, difficulty in micturition and nocturia  Musculoskeletal:  Denies back pain or joint pain   Neurologic: History of stroke, meningioma as well as cognitive difficulty  Endocrine:  Denies polyuria or polydipsia   Lymphatic:  Denies swollen glands   Psychiatric:  Denies depression or anxiety    Physical Exam:    /60 (BP Location: Left arm, Patient Position: Sitting, Cuff Size: Standard)   Pulse (!) 52   Resp 16   Ht 5' 11" (1.803 m)   Wt 95.3 kg (210 lb)   SpO2 98%   BMI 29.29 kg/m²     Physical Exam  PHYSICAL EXAM:  General:  Patient is not in acute distress   Head: Normocephalic, Atraumatic. HEENT:  Both pupils normal-size atraumatic, normocephalic, nonicteric  Neck:  JVP not raised. Trachea central. No carotid bruit  Respiratory:  normal breath sounds no crackles. no rhonchi  Cardiovascular:  Regular rate and rhythm no S3 no murmurs  GI:  Abdomen soft nontender. No organomegaly. Lymphatic:  No cervical or inguinal lymphadenopathy  Neurologic:  Patient is awake alert, oriented . Grossly nonfocal  Extremities no edema    Discussion/Summary:    Patient with multiple medical problems who seems to be doing reasonably well from cardiac standpoint. Previous studies reviewed with patient. Medications reviewed and possible side effects discussed. concepts of cardiovascular disease , signs and symptoms of heart disease. Dietary and risk factor modification reinforced. All questions answered. Safety measures reviewed. Patient advised to report any problems prompting medical attention. CT of the chest done in September 2022 showed dilated thoracic aorta measuring 4.3 cm unchanged from previous studies. Patient has history of mild aortic regurgitation by echocardiogram in 2021. Patient will be scheduled for repeat echocardiogram to reassess ejection fraction and status of aortic regurgitation especially given history of thoracic aortic aneurysm.     Symptoms watch her from cardiac standpoint which would indicate the need for further cardiac evaluation discussed. Medications reviewed. Patient had a few questions which were answered. Follow-up with primary care physician as well as neurology. Follow-up in 6 months or earlier as needed. Patient is agreeable with the plan of care.

## 2023-10-20 ENCOUNTER — EVALUATION (OUTPATIENT)
Age: 78
End: 2023-10-20
Payer: COMMERCIAL

## 2023-10-20 DIAGNOSIS — R41.3 MEMORY DIFFICULTY: Primary | ICD-10-CM

## 2023-10-20 PROCEDURE — 97166 OT EVAL MOD COMPLEX 45 MIN: CPT

## 2023-10-20 PROCEDURE — 97530 THERAPEUTIC ACTIVITIES: CPT

## 2023-10-20 NOTE — PROGRESS NOTES
OCCUPATIONAL THERAPY INITIAL EVALUATION    Today's Date: 10/20/2023  Patient Name: Paige Yuen  :   MRN: [id number]  Referring Provider: Wanda Manley MD  Dx: Memory difficulty [R41.3]    SKILLED ANALYSIS:  Pt is a 78year old male presenting to OP OT s/p referral from neurologist due to cognitive changes and memory difficulty. Pt is independent with ADLs and IADLs. Pt currently reports difficulty with working memory when performing life roles or tasks. Pt reports he has trouble with navigation when driving, however uses GPS with increased success. Pt reports occasional forgetfulness with paying bills and managing finances, has had to be reminded by family to . Pt is demonstrating deficits based on clinical observation and the following assessments: MoCA 8.1 and Cord Making Part A and B. Post assessments pt demonstrating the following: Pt demonstrates mild neuro cognitive impairment per standardized MoCA scoring. Majority of impairments noted in EF/ skills, immediate recall, delayed recall, attention. Demonstrated impaired sustained and alternating attention per age related norms of Trail Making testing. Recommend OP OT 1-2x/wk for 8-12 weeks with focus on aforementioned deficits to maximize functional performance and improve QOL. Findings and recommendations discussed with pt, and they are in agreement. Educated pt on charges of insurance, POC, goal creation, CVA recovery including relation of cognitive symptoms to previous CVA, and OP OT services. POC expires Auth Status Total   Visits  Start date  Expiration date PT/OT + Visit Limit?  Co-Insurance   24 BOMN  10/20 1/19 BOMN $10 Co-pay                                           Visit/Unit Tracking  AUTH Status: Latosha Martines Date 10/20              Visits  Authed: Latosha Martines Used 1 (IE)               Remaining                  PLAN OF CARE START:10/20/23  PLAN OF CARE END: 2024  PROGRESS NOTE DUE: follow up post scheduling   FREQUENCY: 1-2x/week for 8-12 weeks  PRECAUTIONS none  DIAGNOSIS: CVA in , cog deficits   VISITS: 1 (IE)    Subjective  Occupational Profile  Pt lives with girlfriend in private home. 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 PATIENT GOAL: "not forgetting what I have to do"     HISTORY OF PRESENT ILLNESS:     Pt is a 78 y.o. male who was referred to Occupational Therapy s/p  Memory difficulty [R41.3]. Pt with hx of small infarcts in . Most recent neurology visit on 10/5/23 with reports of cognitive changes. Pt has no bouts of OP neuro OT with focus on cognitive skills.      PMH:   Past Medical History:   Diagnosis Date    Anxiety     Bradycardia     Cerebral infarction ([address])     Diabetes mellitus ([address])     Diabetic neuropathy ([address])     Dilated aortic root (HCC)     Essential hypertension     Exercise-induced shortness of breath     Hypertension        Past Surgical Hx:   Past Surgical History:   Procedure Laterality Date    APPENDECTOMY       Assessments    Issa Cognitive Assessment (MoCA)       SCORE Comments                                         COGNITIVE FXN                     MOCA (8.1)       Education Level no= 12 years  +1 point   Visuospatial/executive functioning 2/5     Naming 3/3     Memory: 1st trial: 3/5     Memory: 2nd trial: 4/5     Attention/concentration 2/2     List of letters:  0/1     Serial Seven Subtraction: 3/3     Language/sentence repetition: 2/2     Language Fluency:  0/1     Abstract/Correlational Thinkin/2     Delayed Recall: 2/5     Orientation: 6/6     Memory Index Score 9/15                                        Total Score 22/30     MoCA Scoring        Normal: 26+         Mild Cognitive Impairment: 18-25          Moderate Cognitive Impairment: 10-17         Severe Cognitive Impairment: <10   mild cognitive impairment           SCORE NORM BASED OF AGE COMMENTS   TRAIL MAKING PART A 1 minute and 9 seconds 50.81 seconds IMPAIRED TRAIL MAKING PART B 2 minutes and 56 seconds; mod VC  130.61seconds IMPAIRED        SHORT TERM GOALS    GOAL  STATUS ON IE  GOAL STATUS    Pt will increase sustained attention by completing trail making part A in 1 minute and 7 seconds to complete life roles 1 minute and 9 seconds Established      Pt will increase alternating attention by completing trail making part B in 2 minutes and 54 seconds to complete life roles 2 minutes and 56 seconds Established      Pt will increase delayed recall and recall 3/5 items on MOCA to complete life roles 2/5 Established      Pt will demo increased EF/ skills as evidenced by scoring 3/5 on EF/ portion of MoCA for increased independence with life roles  2/5 Established      Pt will demo ability to alternate attention between 2 tasks with cog loading and 50% accuracy with G retention of task directions in multimodal environment to simulate return to life  roles   Established      LONG TERM GOALS   GOAL  STATUS ON IE  GOAL STATUS    Pt will increase sustained attention by completing trail making part A in 1 minute and 5 seconds to complete life roles 1 minute and 9 seconds Established      Pt will increase alternating attention by completing trail making part B in 2 minutes and 52 seconds to complete life roles 2 minutes and 56 seconds Established      Pt will demo increased delayed recall as evidenced by scoring 10/15 on MIS portion of MoCA for increased independence with life roles 9/10 Established      Pt will demo increased functional cognitive skills as evidenced by scoring 23/30 or higher on MoCA for increased independence with life roles 22/30 Established      Pt will follow 3 step verbal directions in multimodal environment with 85% accuracy for improved role performance and engagement in salient tasks  Established      Pt will demo G recall  of written and/or verbal information utilizing memory strategy of choice for improved STM/delayed memory with 100% accuracy Established      Pt will demo ability to alternate attention between 2 tasks with cog loading and 75% accuracy with G retention of task directions in multimodal environment to simulate return to life  roles   Established          PLANNED THERAPY INTERVENTIONS:  Internal and external memory aides  Hypersensitivity strategies education  Multi-modal environment  Sustained/alternating/divided attention  Temporal Awareness: Organize the Hour activities  Memory and mental manipulation  Auditory processing with immediate recall  Memory retention with immediate and delayed recall  Edu on cog/vision apps

## 2023-10-31 ENCOUNTER — TELEPHONE (OUTPATIENT)
Dept: CARDIOLOGY CLINIC | Facility: CLINIC | Age: 78
End: 2023-10-31

## 2023-10-31 ENCOUNTER — HOSPITAL ENCOUNTER (OUTPATIENT)
Dept: NON INVASIVE DIAGNOSTICS | Facility: CLINIC | Age: 78
Discharge: HOME | End: 2023-10-31
Payer: COMMERCIAL

## 2023-10-31 VITALS
HEIGHT: 71 IN | DIASTOLIC BLOOD PRESSURE: 60 MMHG | BODY MASS INDEX: 29.4 KG/M2 | SYSTOLIC BLOOD PRESSURE: 128 MMHG | HEART RATE: 50 BPM | WEIGHT: 210 LBS

## 2023-10-31 DIAGNOSIS — I25.10 CORONARY ARTERY DISEASE INVOLVING NATIVE CORONARY ARTERY OF NATIVE HEART WITHOUT ANGINA PECTORIS: ICD-10-CM

## 2023-10-31 DIAGNOSIS — I10 HYPERTENSION, ESSENTIAL: ICD-10-CM

## 2023-10-31 DIAGNOSIS — I71.21 ANEURYSM OF ASCENDING AORTA WITHOUT RUPTURE (HCC): ICD-10-CM

## 2023-10-31 LAB
AORTIC ROOT: 4.4 CM
APICAL FOUR CHAMBER EJECTION FRACTION: 70 %
AV LVOT MEAN GRADIENT: 2 MMHG
AV LVOT PEAK GRADIENT: 3 MMHG
AV PEAK GRADIENT: 4 MMHG
AV REGURGITATION PRESSURE HALF TIME: 684 MS
DOP CALC LVOT PEAK VEL VTI: 23.93 CM
DOP CALC LVOT PEAK VEL: 0.92 M/S
E WAVE DECELERATION TIME: 212 MS
E/A RATIO: 0.85
FRACTIONAL SHORTENING: 30 (ref 28–44)
INTERVENTRICULAR SEPTUM IN DIASTOLE (PARASTERNAL SHORT AXIS VIEW): 1.6 CM
INTERVENTRICULAR SEPTUM: 1.6 CM (ref 0.6–1.1)
LAAS-AP2: 19.1 CM2
LAAS-AP4: 17.9 CM2
LEFT ATRIUM AREA SYSTOLE SINGLE PLANE A4C: 17.6 CM2
LEFT ATRIUM SIZE: 3.9 CM
LEFT ATRIUM VOLUME (MOD BIPLANE): 50 ML
LEFT ATRIUM VOLUME INDEX (MOD BIPLANE): 23.3 ML/M2
LEFT INTERNAL DIMENSION IN SYSTOLE: 2.6 CM (ref 2.1–4)
LEFT VENTRICULAR INTERNAL DIMENSION IN DIASTOLE: 3.7 CM (ref 3.5–6)
LEFT VENTRICULAR POSTERIOR WALL IN END DIASTOLE: 1.1 CM
LEFT VENTRICULAR STROKE VOLUME: 33 ML
LVSV (TEICH): 33 ML
MITRAL REGURGITATION PEAK VELOCITY: 5.93 M/S
MITRAL VALVE MEAN INFLOW VELOCITY: 4.57 M/S
MITRAL VALVE REGURGITANT PEAK GRADIENT: 141 MMHG
MV E'TISSUE VEL-SEP: 5 CM/S
MV PEAK A VEL: 0.85 M/S
MV PEAK E VEL: 72 CM/S
MV STENOSIS PRESSURE HALF TIME: 61 MS
MV VALVE AREA P 1/2 METHOD: 3.61
RIGHT ATRIUM AREA SYSTOLE A4C: 7.9 CM2
RIGHT VENTRICLE ID DIMENSION: 2.2 CM
SL CV AV DECELERATION TIME RETROGRADE: 2359 MS
SL CV AV PEAK GRADIENT RETROGRADE: 79 MMHG
SL CV DOP CALC MV VTI RETROGRADE: 226.4 CM
SL CV LEFT ATRIUM LENGTH A2C: 5.5 CM
SL CV MV MEAN GRADIENT RETROGRADE: 96 MMHG
SL CV PED ECHO LEFT VENTRICLE DIASTOLIC VOLUME (MOD BIPLANE) 2D: 58 ML
SL CV PED ECHO LEFT VENTRICLE SYSTOLIC VOLUME (MOD BIPLANE) 2D: 25 ML
TR MAX PG: 28 MMHG
TR PEAK VELOCITY: 2.7 M/S
TRICUSPID ANNULAR PLANE SYSTOLIC EXCURSION: 1.8 CM
TRICUSPID VALVE PEAK REGURGITATION VELOCITY: 2.67 M/S

## 2023-10-31 PROCEDURE — 93306 TTE W/DOPPLER COMPLETE: CPT

## 2023-10-31 PROCEDURE — 93306 TTE W/DOPPLER COMPLETE: CPT | Performed by: INTERNAL MEDICINE

## 2023-10-31 NOTE — TELEPHONE ENCOUNTER
----- Message from Fer Segal MD sent at 10/31/2023  4:14 PM EDT -----  Please call the patient. Echocardiogram shows normal ejection fraction. Mild to moderate mitral regurgitation. Aortic root is dilated measuring 4.4 cm. This is known. To continue present medications and report any symptoms out of the ordinary.

## 2023-11-03 ENCOUNTER — OFFICE VISIT (OUTPATIENT)
Age: 78
End: 2023-11-03
Payer: COMMERCIAL

## 2023-11-03 DIAGNOSIS — R41.3 MEMORY DIFFICULTY: Primary | ICD-10-CM

## 2023-11-03 PROCEDURE — 97530 THERAPEUTIC ACTIVITIES: CPT

## 2023-11-03 NOTE — PROGRESS NOTES
POC expires Auth Status Total   Visits  Start date  Expiration date PT/OT + Visit Limit? Co-Insurance   24 BOMN  10/20 1/19 BOMN $10 Co-pay                                           Visit/Unit Tracking  AUTH Status: Pankaj Davis Date 10/20 11/3             Visits  Authed: Pankaj Davis Used 1 (IE) 1              Remaining                  PLAN OF CARE START:10/20/23  PLAN OF CARE END: 2024  PROGRESS NOTE DUE: follow up post scheduling   FREQUENCY: 1-2x/week for 8-12 weeks  PRECAUTIONS none  DIAGNOSIS: CVA in , cog deficits   VISITS: 2    Daily Note     Today's date: 11/3/2023  Patient name: Yesenia Diego  :   MRN: [id number]  Referring provider: Lisa Bae MD  Dx:   Encounter Diagnosis   Name Primary? Memory difficulty Yes                      Subjective: Pt reports waiting for results on brain scan. Objective: See treatment below. TA:   *Internal and external memory strategy worksheet provided and educated to integrate for at home carry over. Pt highlighting strategies he is currently using or would like to start using at home. Education to start a weekly calendar to keep track of schedule and designate places in house for certain items to limit forgetfulness. *Pt completed exercise writing down items and designated places for them in his house. Pt agreeable to carryover at home when leaving therapy today. Min cues to be as specific as possible and keep memory worksheet on refrigerator as it is a central location to cue locations of items. *Pt seated at table completing multimatrix block transport. Pt focusing on alternating attention, sequencing and problem solving moving numbered blocks on top of lettered blocks in the forwards order memory strategies of chunking and rehearsal.       Assessment: Tolerated treatment well. Pt would benefit from continued OT services to address EF/ skills, immediate recall, delayed recall, attention.       Plan: Continued skilled OT per POC.

## 2023-11-10 ENCOUNTER — OFFICE VISIT (OUTPATIENT)
Age: 78
End: 2023-11-10
Payer: COMMERCIAL

## 2023-11-10 DIAGNOSIS — R41.3 MEMORY DIFFICULTY: Primary | ICD-10-CM

## 2023-11-10 PROCEDURE — 97530 THERAPEUTIC ACTIVITIES: CPT

## 2023-11-10 NOTE — PROGRESS NOTES
POC expires Auth Status Total   Visits  Start date  Expiration date PT/OT + Visit Limit? Co-Insurance   24 BOMN  10/20 1/19 BOMN $10 Co-pay                                           Visit/Unit Tracking  AUTH Status: Serenity Moreno Date 10/20 11/3 11/10            Visits  Authed: Serenity Moreno Used 1 (IE) 1              Remaining                  PLAN OF CARE START:10/20/23  PLAN OF CARE END: 2024  PROGRESS NOTE DUE: follow up post scheduling   FREQUENCY: 1-2x/week for 8-12 weeks  PRECAUTIONS none  DIAGNOSIS: CVA in , cog deficits   VISITS: 3    Daily Note     Today's date: 11/10/2023  Patient name: Ham Kennedy  : [id number]  MRN: [id number]  Referring provider: Mandy Greenberg MD  Dx:   Encounter Diagnosis   Name Primary? Memory difficulty Yes                      Subjective: Pt reports carry over of memory strategies at home placing items in the same spot to improve ability to remember where they are. Objective: See treatment below. TA:   *BITS: all activities completed focusing on short term/working memory, alternating attention, problem solving and sequencing:   Visual scan, alternating UC/lc letters with associated category to verbalize. Mod verbal cues required for sequencing: mental rehearsal used as internal strategy: 70%, 8:46s time to task. Visual pursuits, rotator, 50 #'s reverse order, therapist naming several tasks for pt to complete during task at certain numbers in sequence. Required verbal cue for 50%, delayed independent recall for 50% of tasks, with central flash/fixation: 100%, 7:50's. Semi complex Qbits puzzle focusing on  and EF skills. Moderate verbal and visual cues required for pt success. Pt verbalized strategy post activity. Fading verbal cues and improved independent problem solving for last 25% of activity. Assessment: Tolerated treatment well. 4 cognitive HEP's provided.  Pt education to contact primary care doctor to schedule an appt regarding recent brain MRI results and discuss headaches. Pt would benefit from continued OT services to address EF/ skills, immediate recall, delayed recall, attention. Plan: Continued skilled OT per POC.

## 2023-11-24 ENCOUNTER — OFFICE VISIT (OUTPATIENT)
Age: 78
End: 2023-11-24
Payer: COMMERCIAL

## 2023-11-24 DIAGNOSIS — R41.3 MEMORY DIFFICULTY: Primary | ICD-10-CM

## 2023-11-24 PROCEDURE — 97530 THERAPEUTIC ACTIVITIES: CPT

## 2023-11-24 NOTE — PROGRESS NOTES
POC expires Auth Status Total   Visits  Start date  Expiration date PT/OT + Visit Limit? Co-Insurance   24 BOMN  10/20 1/19 BOMN $10 Co-pay                                           Visit/Unit Tracking  AUTH Status: Prosper Gustafson Date 10/20 11/3 11/10 11/24           Visits  Authed: Prosper Gustafson Used 1 (IE) 1 1 1            Remaining                  PLAN OF CARE START:10/20/23  PLAN OF CARE END: 2024  PROGRESS NOTE DUE: follow up post scheduling   FREQUENCY: 1-2x/week for 8-12 weeks  PRECAUTIONS none  DIAGNOSIS: CVA in , cog deficits   VISITS: 4    Daily Note     Today's date: 2023  Patient name: Se Carmen  :   MRN: [id number]  Referring provider: Earlis Moritz, MD  Dx:   Encounter Diagnosis   Name Primary? Memory difficulty Yes                      Subjective: Pt reports carry over of memory strategies at home placing items in the same spot to improve ability to remember where they are. Objective: See treatment below. TA:   Seated at table completing 120 number board seated tabletop-transporting blocks/green tiles>board by following highlighted key in sequencing ascending order. Placed green tiles counting by 4's and blocks counting by 3's. Simultaneously verbalized and wrote down word associated with color category. Provided a fading external key to recall steps of task and what colors/numbers were associate with what task. Required increased time and up to mod VC for sequencing, alternating attention, and accuracy. *Seated at table, completing word clouds with 30 banana grams pieces focusing on attention, EF, word finding, language fluency and abstraction. Moderate difficulty linking words together in a grid. Tendency to create words individually. Assessment: Tolerated treatment well. 4 cognitive HEP's provided. Pt education to contact primary care doctor to schedule an appt regarding recent brain MRI results and discuss headaches.  Pt would benefit from continued OT services to address EF/ skills, immediate recall, delayed recall, attention. Plan: Continued skilled OT per POC.

## 2023-12-01 ENCOUNTER — OFFICE VISIT (OUTPATIENT)
Age: 78
End: 2023-12-01
Payer: COMMERCIAL

## 2023-12-01 DIAGNOSIS — R41.3 MEMORY DIFFICULTY: Primary | ICD-10-CM

## 2023-12-01 PROCEDURE — 97530 THERAPEUTIC ACTIVITIES: CPT

## 2023-12-01 NOTE — PROGRESS NOTES
POC expires Auth Status Total   Visits  Start date  Expiration date PT/OT + Visit Limit? Co-Insurance   24 BOMN  10/20 1/19 BOMN $10 Co-pay                                           Visit/Unit Tracking  AUTH Status: Estephanie Tripp Date 10/20 11/3 11/10 11/24 12/1          Visits  Authed: Estephanie Tripp Used 1 (IE) 1 1 1 1 (progress note)            Remaining                  PLAN OF CARE START:10/20/23  PLAN OF CARE END: 2024  PROGRESS NOTE DUE: 24 (full re-eval for POC update)   FREQUENCY: 1-2x/week for 8-12 weeks  PRECAUTIONS none  DIAGNOSIS: CVA in , cog deficits   VISITS: 5    Daily Note     Today's date: 2023  Patient name: Az Joyce  :   MRN: [id number]  Referring provider: Tito Patel MD  Dx:   Encounter Diagnosis   Name Primary? Memory difficulty Yes                      Subjective: Pt reports no changes since last visit      Objective: See treatment below. TA:   *Assessed the following this session to assess progress in therapy: Trail Making Part A. All other observations/assessments carried forward from previous evaluation. See below for goal updates.      Issa Cognitive Assessment (MoCA)       SCORE Comments                                         COGNITIVE FXN                     MOCA (8.1)       Education Level no= 12 years  +1 point   Visuospatial/executive functioning 2/5     Naming 3/3     Memory: 1st trial: 3/5     Memory: 2nd trial: 4/5     Attention/concentration 2/2     List of letters:  0/1     Serial Seven Subtraction: 3/3     Language/sentence repetition: 2/2     Language Fluency:  0/1     Abstract/Correlational Thinkin/2     Delayed Recall: 2/5     Orientation: 6/6     Memory Index Score 15                                        Total Score 22/30     MoCA Scoring        Normal: 26+         Mild Cognitive Impairment: 18-25          Moderate Cognitive Impairment: 10-17         Severe Cognitive Impairment: <10   mild cognitive impairment SCORE NORM BASED OF AGE COMMENTS STATUS ON IE   TRAIL MAKING PART A 1 minute and 1 second 50.81 seconds IMPAIRED; IMPROVED  1 minute and 9 seconds   TRAIL MAKING PART B 2 minutes and 56 seconds; mod VC  130.61seconds IMPAIRED 2 minutes and 56 seconds; mod VC         SHORT TERM GOALS    GOAL  STATUS ON IE  GOAL STATUS    Pt will increase sustained attention by completing trail making part A in 1 minute and 7 seconds to complete life roles 1 minute and 9 seconds ACHIEVED        Pt will increase alternating attention by completing trail making part B in 2 minutes and 54 seconds to complete life roles 2 minutes and 56 seconds Established      Pt will increase delayed recall and recall 3/5 items on MOCA to complete life roles 2/5 Established      Pt will demo increased EF/ skills as evidenced by scoring 3/5 on EF/ portion of MoCA for increased independence with life roles  2/5 Established      Pt will demo ability to alternate attention between 2 tasks with cog loading and 50% accuracy with G retention of task directions in multimodal environment to simulate return to life  roles   Established      LONG TERM GOALS   GOAL  STATUS ON IE  GOAL STATUS    Pt will increase sustained attention by completing trail making part A in 1 minute and 5 seconds to complete life roles 1 minute and 9 seconds ACHIEVED        Pt will increase alternating attention by completing trail making part B in 2 minutes and 52 seconds to complete life roles 2 minutes and 56 seconds Established      Pt will demo increased delayed recall as evidenced by scoring 10/15 on MIS portion of MoCA for increased independence with life roles 9/10 Established      Pt will demo increased functional cognitive skills as evidenced by scoring 23/30 or higher on MoCA for increased independence with life roles 22/30 Established      Pt will follow 3 step verbal directions in multimodal environment with 85% accuracy for improved role performance and engagement in salient tasks  Established      Pt will demo G recall  of written and/or verbal information utilizing memory strategy of choice for improved STM/delayed memory with 100% accuracy  Established      Pt will demo ability to alternate attention between 2 tasks with cog loading and 75% accuracy with G retention of task directions in multimodal environment to simulate return to life  roles   Established        * [employer]z seated tabletop focused on EF and  skills. Required mod-max A overall. Pt benefits from only showing 1/4 of 2D image to recreate 3D structure. *time management worksheet seated tabletop-focused on functional problem solving with simulated "real life" scenarios. Required min A for basic problem solving. Increased time required pending complexity of question. Instructed to complete for HEP due to time constraint of session. Assessment: Tolerated treatment well. Isabella Making Part A assessed this session to assess progress in therapy services. Improvement in sustained attention noted. Pt achieved 2 goals and continues to make good progress toward all other goals. Pt has demonstrated good understanding of all HEPs throughout program. Focus of session on testing, EF skills,  skills. Pt would benefit from continued OT services to address EF/ skills, immediate recall, delayed recall, alternating attention. Plan: Continued skilled OT per POC.

## 2023-12-08 ENCOUNTER — OFFICE VISIT (OUTPATIENT)
Age: 78
End: 2023-12-08
Payer: COMMERCIAL

## 2023-12-08 DIAGNOSIS — R41.3 MEMORY DIFFICULTY: Primary | ICD-10-CM

## 2023-12-08 PROCEDURE — 97530 THERAPEUTIC ACTIVITIES: CPT

## 2023-12-08 NOTE — PROGRESS NOTES
POC expires Auth Status Total   Visits  Start date  Expiration date PT/OT + Visit Limit? Co-Insurance   24 BOMN  10/20 1/19 BOMN $10 Co-pay                                           Visit/Unit Tracking  AUTH Status: Prosper Gustafson Date 10/20 11/3 11/10 11/24 12/1 12/8         Visits  Authed: Prosper Gustafson Used 1 (IE) 1 1 1 1 (progress note)  1          Remaining                  PLAN OF CARE START:10/20/23  PLAN OF CARE END: 2024  PROGRESS NOTE DUE: 24 (full re-eval for POC update)   FREQUENCY: 1-2x/week for 8-12 weeks  PRECAUTIONS none  DIAGNOSIS: CVA in , cog deficits   VISITS: 6    Daily Note     Today's date: 2023  Patient name: Se Carmen  :   MRN: [id number]  Referring provider: Earlis Moritz, MD  Dx:   Encounter Diagnosis   Name Primary? Memory difficulty Yes                      Subjective: Pt reports no changes since last visit      Objective: See treatment below. TA:   *IQ puzzler seated tabletop, x2 rounds starter level, x1 round jaime level, 3 rounds total. Focused on EF and  skills. Pt required min VC overall for accuracy, good problem solving skills     *word deduction puzzle seated tabletop, required up to mod VC for assistance. Unable to complete due to time constraint. Pt would like to continue in next session. Assessment: Tolerated treatment well. Focus of session on EF skills,  skills. Pt would benefit from continued OT services to address EF/ skills, immediate recall, delayed recall, alternating attention. Plan: Continued skilled OT per POC.  Complete word deduction puzzle from previous session

## 2023-12-22 ENCOUNTER — OFFICE VISIT (OUTPATIENT)
Age: 78
End: 2023-12-22
Payer: COMMERCIAL

## 2023-12-22 DIAGNOSIS — R41.3 MEMORY DIFFICULTY: Primary | ICD-10-CM

## 2023-12-22 PROCEDURE — 97530 THERAPEUTIC ACTIVITIES: CPT

## 2023-12-22 NOTE — PROGRESS NOTES
POC expires Auth Status Total   Visits  Start date  Expiration date PT/OT + Visit Limit? Co-Insurance   24 BOMN  10/20 1/19 BOMN $10 Co-pay                                           Visit/Unit Tracking  AUTH Status: BOMN Date 10/20 11/3 11/10 11/24 12/1 12/8 12/22        Visits  Authed: BOMN Used 1 (IE) 1 1 1 1 (progress note)  1 1         Remaining                  PLAN OF CARE START:10/20/23  PLAN OF CARE END: 2024  PROGRESS NOTE DUE: 24 (full re-eval for POC update)   FREQUENCY: 1-2x/week for 8-12 weeks  PRECAUTIONS none  DIAGNOSIS: CVA in , cog deficits   VISITS: 7    Daily Note     Today's date: 2023  Patient name: Zay Chance  : 1945  MRN: [id number]  Referring provider: Brando Turner MD  Dx:   Encounter Diagnosis   Name Primary?    Memory difficulty Yes                      Subjective: Pt reports no changes since last visit      Objective: See treatment below.  TA:   *word deduction puzzle seated tabletop, required up to max VC for assistance. Noted difficulty with abstract clues and connecting 1 clue to another in order to obtain answer.  Increased time for completion.     *crossword puzzle seated tabletop. Required no VC for accuracy, good problem solving noted. Instructed to complete for HEP.     Assessment: Tolerated treatment well.  Focus of session on  EF skills, sequencing.  Pt would benefit from continued OT services to address EF/ skills, immediate recall, delayed recall, alternating attention.      Plan: Continued skilled OT per POC.

## 2024-01-02 DIAGNOSIS — I10 HYPERTENSION, ESSENTIAL: ICD-10-CM

## 2024-01-02 RX ORDER — METOPROLOL SUCCINATE 50 MG/1
50 TABLET, EXTENDED RELEASE ORAL 2 TIMES DAILY
Qty: 180 TABLET | Refills: 3 | Status: SHIPPED | OUTPATIENT
Start: 2024-01-02

## 2024-01-05 ENCOUNTER — APPOINTMENT (OUTPATIENT)
Age: 79
End: 2024-01-05
Payer: COMMERCIAL

## 2024-01-12 ENCOUNTER — OFFICE VISIT (OUTPATIENT)
Age: 79
End: 2024-01-12
Payer: COMMERCIAL

## 2024-01-12 DIAGNOSIS — R41.3 MEMORY DIFFICULTY: Primary | ICD-10-CM

## 2024-01-12 PROCEDURE — 97530 THERAPEUTIC ACTIVITIES: CPT

## 2024-01-12 NOTE — PROGRESS NOTES
"  POC expires Auth Status Total   Visits  Start date  Expiration date PT/OT + Visit Limit? Co-Insurance   24 BOMN  10/20 1/19 BOMN $10 Co-pay                                           Visit/Unit Tracking  AUTH Status: BOMN Date 10/20 11/3 11/10 11/24 12/1 12/8 12/22 1/12       Visits  Authed: BOMN Used 1 (IE) 1 1 1 1 (progress note)  1 1 1        Remaining                  PLAN OF CARE START:10/20/23  PLAN OF CARE END: 2024  PROGRESS NOTE DUE: 24 (full re-eval with POC update, determine discharge vs continuation)   FREQUENCY: 1-2x/week for 8-12 weeks  PRECAUTIONS none  DIAGNOSIS: CVA in , cog deficits   VISITS: 8    Daily Note     Today's date: 2024  Patient name: Zay Chance  : 1945  MRN: [id number]  Referring provider: Brando Turner MD  Dx:   Encounter Diagnosis   Name Primary?    Memory difficulty Yes                      Subjective: Pt reports no changes since last visit      Objective: See treatment below.  TA:   *deduction puzzle seated tabletop-required max A overall for completion and problem solving strategies, significant increased time required   *card addition seated tabletop-field of 3 piles via basic addition/subtraction patient able to add all piles to equal \"30\". Utilized scrap paper to keep track of piles. Required mod-max A overall for problem solving      Assessment: Tolerated treatment well.  Focus of session on  EF skills, sequencing. Provided with 1 HEP.  Pt would benefit from continued OT services to address EF/ skills, immediate recall, delayed recall, alternating attention.      Plan: Continued skilled OT per POC.   "

## 2024-01-19 ENCOUNTER — APPOINTMENT (OUTPATIENT)
Age: 79
End: 2024-01-19
Payer: COMMERCIAL

## 2024-02-02 ENCOUNTER — APPOINTMENT (OUTPATIENT)
Age: 79
End: 2024-02-02
Payer: COMMERCIAL

## 2024-02-09 ENCOUNTER — EVALUATION (OUTPATIENT)
Age: 79
End: 2024-02-09
Payer: COMMERCIAL

## 2024-02-09 DIAGNOSIS — R41.3 MEMORY DIFFICULTY: Primary | ICD-10-CM

## 2024-02-09 PROCEDURE — 97168 OT RE-EVAL EST PLAN CARE: CPT

## 2024-02-09 NOTE — PROGRESS NOTES
OCCUPATIONAL THERAPY RE- EVALUATION/DISCHARGE      POC expires Auth Status Total   Visits  Start date  Expiration date PT/OT + Visit Limit? Co-Insurance   24 BOMN  10/20 1/19 BOMN $10 Co-pay                                           Visit/Unit Tracking  AUTH Status: BOMN Date 10/20 11/3 11/10 11/24 12/1 12/8 12/22 1/12 2/9      Visits  Authed: BOMN Used 1 (IE) 1 1 1 1 (progress note)  1 1 1 1 (FULL RE-EVAL WITH DISCHARG)       Remaining                  PLAN OF CARE START:10/20/23  PLAN OF CARE END: today   PROGRESS NOTE DUE: NA  FREQUENCY: NA  PRECAUTIONS none  DIAGNOSIS: CVA in , cog deficits   VISITS: 2    Today's Date: 2024  Patient Name: Zay Chance  : 1945  MRN: [id number]  Referring Provider: Brando Turner MD  Dx: Memory difficulty [R41.3]    SKILLED ANALYSIS:  Pt is a 77 year old male presenting to OP OT re-evaluation on 24 s/p referral from neurologist due to cognitive changes and memory difficulty.  Pt is independent with ADLs and IADLs.  Pt currently reports continued  difficulty with working memory when performing life roles or tasks. Improved sense of direction reported during driving with limited use of GPS. Increased ability to maintain finances-using external aides to assist as reminders. Pt is demonstrating deficits based on clinical observation and the following assessments: MoCA 8.1 and [place] Making Part A and B. Post assessments pt demonstrating the following: Pt continues to demonstrate mild neuro cognitive impairment per standardized MoCA scoring. Majority of impairments noted in EF/ skills, immediate recall, delayed recall.  Demonstrated intact sustained and impaired alternating attention per age related norms of Trail Making testing. Decline in testing may be related to lack of HEP compliance, life stressors (pt reports he is planning a wedding for next month), and lack of attendance/consistency with therapy sessions. At this time patient has achieved  "maximal level of functional independence. Pt has demonstrated fair understanding of HEPs and has made minimal progress in skilled OT services. Recommendation to discharge at this time. Pt educated on progress/therapy process and pt in understanding and agreement of recommendations. Instructed pt he will need a new script if he plans on returning to OT for cognitive rehab services if he notices a cognitive decline.       Subjective  Occupational Profile  Pt lives with girlfriend in private home. 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     PATIENT GOAL: \"not forgetting what I have to do\"     HISTORY OF PRESENT ILLNESS:     Pt is a 78 y.o. male who was referred to Occupational Therapy s/p  Memory difficulty [R41.3]. Pt with hx of small infarcts in . Most recent neurology visit on 10/5/23 with reports of cognitive changes. Pt has no bouts of OP neuro OT with focus on cognitive skills.     PMH:   Past Medical History:   Diagnosis Date    Anxiety     Bradycardia     Cerebral infarction (HCC)     Diabetes mellitus (HCC)     Diabetic neuropathy (HCC)     Dilated aortic root (HCC)     Essential hypertension     Exercise-induced shortness of breath     Hypertension        Past Surgical Hx:   Past Surgical History:   Procedure Laterality Date    APPENDECTOMY       Assessments    Issa Cognitive Assessment (MoCA)    SCORE Comments                                         COGNITIVE FXN                     MOCA (7.2)       Education Level no= 12 years  +1 point   Visuospatial/executive functioning 2/5     Naming 3/3     Memory: 1st trial: 3/5     Memory: 2nd trial: 4/5     Attention/concentration 2/2     List of letters:  1/     Serial Seven Subtraction: 1/3     Language/sentence repetition: 1/2     Language Fluency:  0/1 5 words     Abstract/Correlational Thinkin/2     Delayed Recall: 0/5     Orientation: 6/6     Memory Index Score 3/15                                        " Total Score 19/30 Previous was 22/30     MoCA Scoring        Normal: 26+         Mild Cognitive Impairment: 18-25          Moderate Cognitive Impairment: 10-17         Severe Cognitive Impairment: <10   mild cognitive impairment           SCORE NORM BASED OF AGE COMMENTS STATUS ON IE   TRAIL MAKING PART A 41 seconds 50.81 seconds WFL;IMPROVED  1 minute and 9 seconds   TRAIL MAKING PART B 2 minutes and 58 seconds; mod VC  130.61seconds IMPAIRED; DECLINED  2 minutes and 56 seconds; mod VC         SHORT TERM GOALS    GOAL  STATUS ON IE  GOAL STATUS    Pt will increase sustained attention by completing trail making part A in 1 minute and 7 seconds to complete life roles 1 minute and 9 seconds ACHIEVED        Pt will increase alternating attention by completing trail making part B in 2 minutes and 54 seconds to complete life roles 2 minutes and 56 seconds NOT ACHIEVED      Pt will increase delayed recall and recall 3/5 items on MOCA to complete life roles 2/5 NOT ACHIEVED      Pt will demo increased EF/ skills as evidenced by scoring 3/5 on EF/ portion of MoCA for increased independence with life roles  2/5 NOT ACHIEVED      Pt will demo ability to alternate attention between 2 tasks with cog loading and 50% accuracy with G retention of task directions in multimodal environment to simulate return to life  roles   NOT ACHIEVED      LONG TERM GOALS   GOAL  STATUS ON IE  GOAL STATUS    Pt will increase sustained attention by completing trail making part A in 1 minute and 5 seconds to complete life roles 1 minute and 9 seconds ACHIEVED        Pt will increase alternating attention by completing trail making part B in 2 minutes and 52 seconds to complete life roles 2 minutes and 56 seconds NOT ACHIEVED      Pt will demo increased delayed recall as evidenced by scoring 10/15 on MIS portion of MoCA for increased independence with life roles 9/10 NOT ACHIEVED      Pt will demo increased functional cognitive skills as  evidenced by scoring 23/30 or higher on MoCA for increased independence with life roles 22/30 NOT ACHIEVED      Pt will follow 3 step verbal directions in multimodal environment with 85% accuracy for improved role performance and engagement in salient tasks  NOT ACHIEVED      Pt will demo G recall  of written and/or verbal information utilizing memory strategy of choice for improved STM/delayed memory with 100% accuracy  NOT ACHIEVED      Pt will demo ability to alternate attention between 2 tasks with cog loading and 75% accuracy with G retention of task directions in multimodal environment to simulate return to life  roles   NOT ACHIEVED

## 2024-02-16 ENCOUNTER — APPOINTMENT (OUTPATIENT)
Age: 79
End: 2024-02-16
Payer: COMMERCIAL

## 2024-02-19 ENCOUNTER — TELEPHONE (OUTPATIENT)
Dept: PSYCHIATRY | Facility: CLINIC | Age: 79
End: 2024-02-19

## 2024-02-19 NOTE — TELEPHONE ENCOUNTER
Reached out to patient in regards to Neuropsychology referral. Spoke with pt, stated now was not a good time to speak because pt was driving asked for a follow-up call. Writer informed a follow-up call will be made accordingly.

## 2024-02-23 ENCOUNTER — APPOINTMENT (OUTPATIENT)
Age: 79
End: 2024-02-23
Payer: COMMERCIAL

## 2024-02-23 NOTE — TELEPHONE ENCOUNTER
Reached out to pt regarding Neuropsychology referral, spoke with pt he did not wish to continue with referral at this time and would like to hold off. Writer informed pt referral will be closed at this time and to contact intake department if needed.     Closed referral at this time.

## 2024-05-16 ENCOUNTER — OFFICE VISIT (OUTPATIENT)
Dept: NEUROLOGY | Facility: CLINIC | Age: 79
End: 2024-05-16
Payer: COMMERCIAL

## 2024-05-16 VITALS
BODY MASS INDEX: 28.09 KG/M2 | OXYGEN SATURATION: 97 % | HEART RATE: 62 BPM | HEIGHT: 72 IN | SYSTOLIC BLOOD PRESSURE: 100 MMHG | DIASTOLIC BLOOD PRESSURE: 70 MMHG | WEIGHT: 207.4 LBS

## 2024-05-16 DIAGNOSIS — R41.3 MEMORY DIFFICULTY: ICD-10-CM

## 2024-05-16 DIAGNOSIS — D32.9 MENINGIOMA (HCC): ICD-10-CM

## 2024-05-16 DIAGNOSIS — E11.42 DIABETIC PERIPHERAL NEUROPATHY (HCC): ICD-10-CM

## 2024-05-16 DIAGNOSIS — Z86.73 HISTORY OF CVA (CEREBROVASCULAR ACCIDENT): ICD-10-CM

## 2024-05-16 PROCEDURE — 99214 OFFICE O/P EST MOD 30 MIN: CPT | Performed by: PSYCHIATRY & NEUROLOGY

## 2024-05-16 NOTE — PROGRESS NOTES
Zay Chance is a 78 y.o. male.   Chief Complaint   Patient presents with    History of CVA    Memory difficulty    Peripheral Neuropathy       Assessment:    1. History of CVA (cerebrovascular accident)    2. Memory difficulty    3. Diabetic peripheral neuropathy (HCC)    4. Meningioma (HCC)       Plan:  Patient's recent hemoglobin A1c from 4/27/2024 was reviewed with him it was 7.4 and was advised to keep it less than 6.5, he was advised to discuss with family physician and see a dietitian, and to eat healthy nutritious food.  Patient did follow-up with an ID doctor regarding the abnormal RPR and FTA as per the patient.    He went for occupational therapy and was advised to continue with home exercise program, stroke education given to the patient, to keep his blood pressure cholesterol and sugar under control, to go to the hospital if has any worsening symptoms and call me.    Patient does have cognitive impairment with his history of stroke and a positive RPR and FTA I would have advised him to go for neuropsych testing and also to continue with mentally stimulating exercises at home as he finished his cognitive therapy, his neuropathy symptoms are controlled on gabapentin which is being managed by his family physician to go to the hospital if has any worsening symptoms and call me otherwise to see me back in 6 months or sooner if needed and follow-up with the other physicians.          Subjective:    HPI   Patient is here in follow-up for his history of CVA without any residual deficits, since his last visit he tells me that he is doing good, he thinks his memory is stable, he denies any dizziness, his neuropathy symptoms in the feet are under control, appetite is good weight has been stable he is able to do his ADLs manage his finances able to drive he lives by himself, he is currently not depressed, no other complaints.    Vitals:    05/16/24 1127   BP: 100/70   BP Location: Left arm   Patient Position:  "Sitting   Cuff Size: Large   Pulse: 62   SpO2: 97%   Height: 5' 11.5\" (1.816 m)       Current Medications    Current Outpatient Medications:     amLODIPine (NORVASC) 10 mg tablet, every morning  , Disp: , Rfl:     aspirin (ECOTRIN LOW STRENGTH) 81 mg EC tablet, Take 1 tablet by mouth daily, Disp: , Rfl:     D3-50 1.25 MG (67601 UT) capsule, Take 50,000 Units by mouth once a week, Disp: , Rfl:     gabapentin (NEURONTIN) 300 mg capsule, Take 1 capsule (300 mg total) by mouth 2 (two) times a day, Disp: 60 capsule, Rfl: 11    glipiZIDE (GLUCOTROL XL) 2.5 mg 24 hr tablet, Take 1 tablet by mouth daily, Disp: , Rfl:     Jardiance 10 MG TABS, Take 10 mg by mouth daily, Disp: , Rfl:     losartan (COZAAR) 100 MG tablet, take 1 tablet by mouth once daily, Disp: 90 tablet, Rfl: 3    Lumigan 0.01 % ophthalmic drops, instill 1 drop into both eyes once daily at bedtime, Disp: , Rfl:     meloxicam (MOBIC) 15 mg tablet, Take 15 mg by mouth if needed, Disp: , Rfl:     metoprolol succinate (TOPROL-XL) 50 mg 24 hr tablet, Take 1 tablet (50 mg total) by mouth 2 (two) times a day, Disp: 180 tablet, Rfl: 3    ONETOUCH DELICA LANCETS 33G MISC, use ONE LANCET twice a day, Disp: , Rfl: 0    ONETOUCH VERIO test strip, TEST twice a day, Disp: , Rfl: 0    pravastatin (PRAVACHOL) 10 mg tablet, Take 10 mg by mouth daily, Disp: , Rfl:     rivastigmine (EXELON) 9.5 mg/24 hr TD 24 hr patch, Place 1 patch on the skin over 24 hours daily, Disp: 30 patch, Rfl: 5    sildenafil (VIAGRA) 50 MG tablet, Take 50 mg by mouth daily as needed  , Disp: , Rfl:     sitaGLIPtin (JANUVIA) 100 mg tablet, Take 1 tablet by mouth daily, Disp: , Rfl:     rivastigmine (EXELON) 4.6 mg/24 hr TD 24 hr patch, Place 1 patch on the skin over 24 hours daily (Patient not taking: Reported on 5/16/2024), Disp: 30 patch, Rfl: 0      Allergies  Patient has no known allergies.    Past Medical History  Past Medical History:   Diagnosis Date    Anxiety     Bradycardia     Cerebral " infarction (HCC)     Diabetes mellitus (HCC)     Diabetic neuropathy (HCC)     Dilated aortic root (HCC)     Essential hypertension     Exercise-induced shortness of breath     Hypertension          Past Surgical History:  Past Surgical History:   Procedure Laterality Date    APPENDECTOMY           Family History:  Family History   Problem Relation Age of Onset    Ovarian cancer Mother     Cancer Sister     No Known Problems Father     No Known Problems Brother     No Known Problems Maternal Grandmother     No Known Problems Maternal Grandfather     No Known Problems Paternal Grandmother     No Known Problems Paternal Grandfather     No Known Problems Brother     No Known Problems Sister     No Known Problems Sister     No Known Problems Son     No Known Problems Son     No Known Problems Daughter     No Known Problems Daughter     No Known Problems Daughter        Social History:   reports that he quit smoking about 54 years ago. His smoking use included cigarettes. He has never used smokeless tobacco. He reports current alcohol use of about 3.0 - 4.0 standard drinks of alcohol per week. He reports that he does not currently use drugs after having used the following drugs: Marijuana.    I have reviewed the past medical history, surgical history, social and family history, current medications, allergies vitals, review of systems, and updated this information as appropriate today.   Objective:    Physical Exam    Neurological Exam     GENERAL:  Cooperative in no acute distress. Well-developed and well-nourished     HEAD and NECK   Head is atraumatic normocephalic with no lesions or masses. Neck is supple with full range of motion     CARDIOVASCULAR  Carotid Arteries-no carotid bruits.     NEUROLOGIC:  Mental Status-the patient is awake alert and oriented without aphasia or apraxia, MoCA 17/30  Cranial Nerves: Visual fields are full to confrontation.  . Extraocular movements are full without nystagmus. Pupils are  2-1/2 mm and reactive. Face is symmetrical to light touch. Movements of facial expression move symmetrically. Hearing is normal to finger rub bilaterally. Soft palate lifts symmetrically. Shoulder shrug is symmetrical. Tongue is midline without atrophy.  Motor: No drift is noted on arm extension. Strength is full in the upper and lower extremities with normal bulk and tone.  Gait is unremarkable.  No temporal artery tenderness.    ROS:  Review of Systems   Constitutional:  Negative for appetite change, fatigue and fever.   HENT: Negative.  Negative for hearing loss, tinnitus, trouble swallowing and voice change.    Eyes: Negative.  Negative for photophobia, pain and visual disturbance.   Respiratory: Negative.  Negative for shortness of breath.    Cardiovascular: Negative.  Negative for palpitations.   Gastrointestinal: Negative.  Negative for nausea and vomiting.   Endocrine: Negative.  Negative for cold intolerance.   Genitourinary: Negative.  Negative for dysuria, frequency and urgency.   Musculoskeletal:  Negative for back pain, gait problem, myalgias, neck pain and neck stiffness.   Skin: Negative.  Negative for rash.   Allergic/Immunologic: Negative.    Neurological: Negative.  Negative for dizziness, tremors, seizures, syncope, facial asymmetry, speech difficulty, weakness, light-headedness, numbness and headaches.   Hematological: Negative.  Does not bruise/bleed easily.   Psychiatric/Behavioral: Negative.  Negative for confusion, hallucinations and sleep disturbance.

## 2024-05-30 ENCOUNTER — TELEPHONE (OUTPATIENT)
Dept: NEUROLOGY | Facility: CLINIC | Age: 79
End: 2024-05-30

## 2024-05-30 DIAGNOSIS — E11.42 DIABETIC PERIPHERAL NEUROPATHY (HCC): ICD-10-CM

## 2024-05-30 NOTE — TELEPHONE ENCOUNTER
"Colt'jesenia  5/30/24 3:21PM:    \"Albert monk. I am calling about a refill that I requested from the pharmacy. And there said they are trying to get in touch with the doctor. And they can get you on the reply. So could you please give me a call [phone], please. I need to get to refill on my Gabapentin.    # [phone]  ---------------------------------  LOV 5/16/24 with Dr. Turner. Per office note: \"his neuropathy symptoms are controlled on gabapentin which is being managed by his family physician to go to the hospital if has any worsening symptoms and call me otherwise to see me back in 6 months or sooner if needed and follow-up with the other physicians.\"          "

## 2024-05-30 NOTE — TELEPHONE ENCOUNTER
Recd  5/30 10:49 AM  Good morning, my name is sue monk. i am calling about a prescription could you return my call please.  Thank you.  [phone]

## 2024-05-31 NOTE — TELEPHONE ENCOUNTER
VANESSA 5/31 at 3:36 pm:    My name is Zay Chance. I am calling about a refill. Will you please call me back? # [phone]  ___________________________________________________________    Called pt. Left a message on his voice mail requesting a return call.

## 2024-06-04 RX ORDER — GABAPENTIN 300 MG/1
300 CAPSULE ORAL 2 TIMES DAILY
Qty: 180 CAPSULE | Refills: 3 | Status: SHIPPED | OUTPATIENT
Start: 2024-06-04

## 2024-06-04 NOTE — TELEPHONE ENCOUNTER
6/4/24, 10:53    My name is Zay Chance. I am calling in regards to a refill from my gabapentin, date of birth is [birth year]. Could you please return my call? I've been calling since last week and cannot speak to no one.    Call returned to pt. Acknowledge vm. Pt is taking gabapentin 300 mg bid. Requesting rx, 90 day supply be sent to [facility] pharmacy on file.     Rx entered. Pls review and sign off    thanks

## 2024-06-14 ENCOUNTER — APPOINTMENT (OUTPATIENT)
Dept: LAB | Facility: CLINIC | Age: 79
End: 2024-06-14
Payer: COMMERCIAL

## 2024-06-14 DIAGNOSIS — D32.9 MENINGIOMA (HCC): Primary | ICD-10-CM

## 2024-06-14 LAB
BUN SERPL-MCNC: 18 MG/DL (ref 5–25)
CREAT SERPL-MCNC: 1.1 MG/DL (ref 0.6–1.3)
GFR SERPL CREATININE-BSD FRML MDRD: 64 ML/MIN/1.73SQ M

## 2024-06-14 PROCEDURE — 36415 COLL VENOUS BLD VENIPUNCTURE: CPT

## 2024-06-14 PROCEDURE — 82565 ASSAY OF CREATININE: CPT

## 2024-06-14 PROCEDURE — 84520 ASSAY OF UREA NITROGEN: CPT

## 2024-06-17 ENCOUNTER — HOSPITAL ENCOUNTER (OUTPATIENT)
Dept: MRI IMAGING | Facility: HOSPITAL | Age: 79
Discharge: HOME | End: 2024-06-17
Payer: COMMERCIAL

## 2024-06-17 DIAGNOSIS — D32.9 MENINGIOMA (HCC): ICD-10-CM

## 2024-06-17 PROCEDURE — A9585 GADOBUTROL INJECTION: HCPCS | Performed by: NURSE PRACTITIONER

## 2024-06-17 PROCEDURE — 70553 MRI BRAIN STEM W/O & W/DYE: CPT

## 2024-06-17 PROCEDURE — G1004 CDSM NDSC: HCPCS

## 2024-06-17 RX ORDER — GADOBUTROL 604.72 MG/ML
9 INJECTION INTRAVENOUS
Status: COMPLETED | OUTPATIENT
Start: 2024-06-17 | End: 2024-06-17

## 2024-06-17 RX ADMIN — GADOBUTROL 9 ML: 604.72 INJECTION INTRAVENOUS at 16:42

## 2024-06-24 ENCOUNTER — OFFICE VISIT (OUTPATIENT)
Dept: NEUROSURGERY | Facility: CLINIC | Age: 79
End: 2024-06-24
Payer: COMMERCIAL

## 2024-06-24 VITALS
BODY MASS INDEX: 28.04 KG/M2 | WEIGHT: 207 LBS | HEART RATE: 51 BPM | OXYGEN SATURATION: 99 % | SYSTOLIC BLOOD PRESSURE: 118 MMHG | DIASTOLIC BLOOD PRESSURE: 70 MMHG | TEMPERATURE: 97.3 F | HEIGHT: 72 IN | RESPIRATION RATE: 16 BRPM

## 2024-06-24 DIAGNOSIS — D32.9 MENINGIOMA (HCC): Primary | ICD-10-CM

## 2024-06-24 PROCEDURE — 99213 OFFICE O/P EST LOW 20 MIN: CPT | Performed by: NURSE PRACTITIONER

## 2024-06-24 RX ORDER — METHOCARBAMOL 500 MG/1
TABLET, FILM COATED ORAL AS NEEDED
COMMUNITY
Start: 2024-05-22

## 2024-06-24 NOTE — ASSESSMENT & PLAN NOTE
Presents for annual surveillance of right lateral frontal convexity 1.5 cm meningioma  Discovered incidentally on imaging completed by Dr. Turner (neurology) for work up of dizziness in 2020.  C/o ongoing headaches.  Exam is non-focal.    Imaging:  MRI brain w/wo, 6/17/2024: Right lateral frontal convexity extra-axial enhancing mass measuring 1.7 x 0.7 cm, unchanged in size from 11/6/2020 when measured similarly.     Plan:  Reviewed imaging extensively with patient.  Discussed that finding has appeared stable over a period of 4 years and this is reassuring.  Per NCCN guidelines, recommend continued surveillance x 5 years.  Follow up in 1 year with MRI brain w/wo.  If stable at next appointment, can consider discontinuing surveillance.

## 2024-06-24 NOTE — PROGRESS NOTES
Neurosurgery Office Note  Zay Chance 78 y.o. male MRN: [id number]      Assessment & Plan     Meningioma (HCC)  Presents for annual surveillance of right lateral frontal convexity 1.5 cm meningioma  Discovered incidentally on imaging completed by Dr. Turner (neurology) for work up of dizziness in 2020.  C/o ongoing headaches.  Exam is non-focal.    Imaging:  MRI brain w/wo, 6/17/2024: Right lateral frontal convexity extra-axial enhancing mass measuring 1.7 x 0.7 cm, unchanged in size from 11/6/2020 when measured similarly.     Plan:  Reviewed imaging extensively with patient.  Discussed that finding has appeared stable over a period of 4 years and this is reassuring.  Per NCCN guidelines, recommend continued surveillance x 5 years.  Follow up in 1 year with MRI brain w/wo.  If stable at next appointment, can consider discontinuing surveillance.     Diagnoses and all orders for this visit:    Meningioma (HCC)  -     MRI brain w wo contrast; Future    Other orders  -     methocarbamol (ROBAXIN) 500 mg tablet; as needed          I have spent a total time of 30 minutes on 06/24/24 in caring for this patient including Diagnostic results, Risks and benefits of tx options, Instructions for management, Patient and family education, Importance of tx compliance, Risk factor reductions, Impressions, Counseling / Coordination of care, Documenting in the medical record, Reviewing / ordering tests, medicine, procedures  , and Obtaining or reviewing history  .      CHIEF COMPLAINT    Chief Complaint   Patient presents with    Follow-up     Meningioma, with MRI       HISTORY    History of Present Illness     78 y.o. year old male     With past medical history of diabetes, diabetes neuropathy, hypertension, previous CVA, who presents for annual surveillance of 1.5 cm right lateral frontal convexity meningioma.  This was discovered incidentally in 2022 when he underwent MRI of the brain as part of work-up for ongoing dizziness  with Dr. Turner.  Since that time finding has remained stable over serial imaging.    Patient denies any complaints of pain or discomfort. Denies any visual disturbance.  Denies any numbness or weakness.  Denies any seizures. He is retired and lives at home with his bebo Schroeder.        See Discussion    REVIEW OF SYSTEMS    Review of Systems   HENT:  Negative for tinnitus.    Eyes:  Negative for pain and visual disturbance.   Respiratory:  Negative for chest tightness and shortness of breath.    Gastrointestinal: Negative.    Genitourinary: Negative.    Musculoskeletal:  Negative for gait problem.   Neurological:  Positive for light-headedness (sometimes, with changing position). Negative for seizures, speech difficulty, weakness and headaches.        Neuropathy lower legs   Hematological:  Does not bruise/bleed easily.        ASA   Psychiatric/Behavioral:  Negative for confusion and decreased concentration.        ROS obtained by MA. Reviewed. See HPI.     Meds/Allergies     Current Outpatient Medications   Medication Sig Dispense Refill    amLODIPine (NORVASC) 10 mg tablet every morning        aspirin (ECOTRIN LOW STRENGTH) 81 mg EC tablet Take 1 tablet by mouth daily      D3-50 1.25 MG (26373 UT) capsule Take 50,000 Units by mouth once a week      gabapentin (NEURONTIN) 300 mg capsule Take 1 capsule (300 mg total) by mouth 2 (two) times a day 180 capsule 3    glipiZIDE (GLUCOTROL XL) 2.5 mg 24 hr tablet Take 1 tablet by mouth daily      Jardiance 10 MG TABS Take 10 mg by mouth daily      losartan (COZAAR) 100 MG tablet take 1 tablet by mouth once daily 90 tablet 3    Lumigan 0.01 % ophthalmic drops instill 1 drop into both eyes once daily at bedtime      meloxicam (MOBIC) 15 mg tablet Take 15 mg by mouth if needed      methocarbamol (ROBAXIN) 500 mg tablet as needed      metoprolol succinate (TOPROL-XL) 50 mg 24 hr tablet Take 1 tablet (50 mg total) by mouth 2 (two) times a day 180 tablet 3    pravastatin  (PRAVACHOL) 10 mg tablet Take 10 mg by mouth daily      rivastigmine (EXELON) 9.5 mg/24 hr TD 24 hr patch Place 1 patch on the skin over 24 hours daily (Patient taking differently: Place 1 patch on the skin as needed) 30 patch 5    sildenafil (VIAGRA) 50 MG tablet Take 50 mg by mouth daily as needed        sitaGLIPtin (JANUVIA) 100 mg tablet Take 1 tablet by mouth daily      ONETOUCH DELICA LANCETS 33G MISC use ONE LANCET twice a day  0    ONETOUCH VERIO test strip TEST twice a day  0    rivastigmine (EXELON) 4.6 mg/24 hr TD 24 hr patch Place 1 patch on the skin over 24 hours daily (Patient not taking: Reported on 2024) 30 patch 0     No current facility-administered medications for this visit.       No Known Allergies    PAST HISTORY    Past Medical History:   Diagnosis Date    Anxiety     Bradycardia     Cerebral infarction (HCC)     Diabetes mellitus (HCC)     Diabetic neuropathy (HCC)     Dilated aortic root (HCC)     Essential hypertension     Exercise-induced shortness of breath     Hypertension        Past Surgical History:   Procedure Laterality Date    APPENDECTOMY         Social History     Tobacco Use    Smoking status: Former     Current packs/day: 0.00     Types: Cigarettes     Quit date:      Years since quittin.5    Smokeless tobacco: Never   Vaping Use    Vaping status: Never Used   Substance Use Topics    Alcohol use: Yes     Alcohol/week: 3.0 - 4.0 standard drinks of alcohol     Types: 1 - 2 Cans of beer, 1 Glasses of wine, 1 Shots of liquor per week     Comment: occasionally    Drug use: Not Currently     Types: Marijuana     Comment: Quit in        Family History   Problem Relation Age of Onset    Ovarian cancer Mother     Cancer Sister     No Known Problems Father     No Known Problems Brother     No Known Problems Maternal Grandmother     No Known Problems Maternal Grandfather     No Known Problems Paternal Grandmother     No Known Problems Paternal Grandfather     No Known  "Problems Brother     No Known Problems Sister     No Known Problems Sister     No Known Problems Son     No Known Problems Son     No Known Problems Daughter     No Known Problems Daughter     No Known Problems Daughter          Above history personally reviewed.       EXAM    Vitals:Blood pressure 118/70, pulse (!) 51, temperature (!) 97.3 °F (36.3 °C), temperature source Temporal, resp. rate 16, height 5' 11.5\" (1.816 m), weight 93.9 kg (207 lb), SpO2 99%.,Body mass index is 28.47 kg/m².     Physical Exam  Constitutional:       General: He is not in acute distress.     Appearance: He is well-developed. He is not diaphoretic.   Eyes:      General:         Right eye: No discharge.         Left eye: No discharge.      Extraocular Movements: EOM normal.      Conjunctiva/sclera: Conjunctivae normal.      Pupils: Pupils are equal, round, and reactive to light.   Pulmonary:      Effort: Pulmonary effort is normal. No respiratory distress.   Abdominal:      General: There is no distension.      Tenderness: There is no abdominal tenderness.   Musculoskeletal:         General: Normal range of motion.      Cervical back: Normal range of motion and neck supple.   Skin:     General: Skin is warm and dry.   Neurological:      Mental Status: He is alert and oriented to person, place, and time.      Cranial Nerves: No cranial nerve deficit.      Motor: Motor strength is normal.No weakness.      Coordination: Finger-Nose-Finger Test normal.      Deep Tendon Reflexes: Reflexes normal.   Psychiatric:         Speech: Speech normal.         Behavior: Behavior normal.         Thought Content: Thought content normal.         Judgment: Judgment normal.         Neurologic Exam     Mental Status   Oriented to person, place, and time.   Oriented to person.   Oriented to place.   Oriented to time. Oriented to year, month and date.   Attention: normal. Concentration: normal.   Speech: speech is normal   Level of consciousness: " alert    Cranial Nerves     CN III, IV, VI   Pupils are equal, round, and reactive to light.  Extraocular motions are normal.   Right pupil: Size: 3 mm. Shape: regular. Reactivity: brisk. Consensual response: intact. Accommodation: intact.   Left pupil: Size: 3 mm. Shape: regular. Reactivity: brisk. Consensual response: intact. Accommodation: intact.   Nystagmus: none   Diplopia: none  Conjugate gaze: present    CN V   Right facial sensation deficit: none  Left facial sensation deficit: none    CN VII   Facial expression full, symmetric.     CN VIII   Hearing: intact    CN IX, X   Palate: symmetric    CN XI   Right sternocleidomastoid strength: normal  Left sternocleidomastoid strength: normal  Right trapezius strength: normal  Left trapezius strength: normal    CN XII   Tongue: not atrophic  Fasciculations: absent  Tongue deviation: none    Motor Exam   Muscle bulk: normal  Overall muscle tone: normal  Right arm pronator drift: absent  Left arm pronator drift: absent    Strength   Strength 5/5 throughout.     Sensory Exam   Light touch normal.   Proprioception normal.     Gait, Coordination, and Reflexes     Coordination   Finger to nose coordination: normal    Tremor   Resting tremor: absent  Intention tremor: absent  Action tremor: absent        MEDICAL DECISION MAKING    Imaging Studies:     MRI brain w wo contrast    Result Date: 6/21/2024  Narrative: MRI BRAIN WITH AND WITHOUT CONTRAST INDICATION: D32.9: Benign neoplasm of meninges, unspecified. COMPARISON: MRI brain 6/21/2023, 11/6/2020, 9/15/2010. TECHNIQUE: Multiplanar, multisequence imaging of the brain was performed before and after gadolinium administration. IV Contrast:  9 mL of Gadobutrol injection (SINGLE-DOSE) IMAGE QUALITY:   Diagnostic. FINDINGS: BRAIN PARENCHYMA: Right lateral frontal convexity extra-axial enhancing mass measuring 1.7 x 0.7 cm, unchanged in size from 11/6/2020 when measured similarly. Chronic left frontal, left basal ganglia, and  right cerebellar lacunar infarcts. No midline shift. There is no intracranial hemorrhage.  Normal posterior fossa.  Diffusion imaging is unremarkable. Small scattered hyperintensities on T2/FLAIR imaging are noted in the periventricular and subcortical white matter demonstrating an appearance that is statistically most likely to represent mild microangiopathic change. VENTRICLES:  Normal for the patient's age. SELLA AND PITUITARY GLAND:  Normal. ORBITS:  Normal. PARANASAL SINUSES:  Normal. VASCULATURE:  Evaluation of the major intracranial vasculature demonstrates appropriate flow voids. CALVARIUM AND SKULL BASE:  Normal. EXTRACRANIAL SOFT TISSUES:  Normal.     Impression: Stable right frontal convexity meningioma measuring. Resident: SIMRAN BAILEY I, the attending radiologist, have reviewed the images and agree with the final report above. Workstation performed: [id number]       I have personally reviewed pertinent reports.   and I have personally reviewed pertinent films in PACS

## 2024-08-19 DIAGNOSIS — I10 HYPERTENSION, ESSENTIAL: ICD-10-CM

## 2024-08-19 RX ORDER — LOSARTAN POTASSIUM 100 MG/1
TABLET ORAL
Qty: 30 TABLET | Refills: 0 | Status: SHIPPED | OUTPATIENT
Start: 2024-08-19

## 2024-08-19 NOTE — TELEPHONE ENCOUNTER
Patient needs updated blood work. Please place orders. A courtesy refill was provided.   K    Patient needs an appointment. Please contact the patient to schedule an appointment. Courtesy refill provided.

## 2024-09-13 ENCOUNTER — TELEPHONE (OUTPATIENT)
Age: 79
End: 2024-09-13

## 2024-09-13 NOTE — TELEPHONE ENCOUNTER
09/13/24    Patient called office today to check if he had an appt with Dr. Turner.    Confirmed to patient that he has an appt with Dr. Turner on 10/25/24, 10:30 AM at the [facility].    Patient made a note of it.    Appt confirmed.       Any questions, please contact patient.   Thank You.

## 2024-09-16 DIAGNOSIS — I10 HYPERTENSION, ESSENTIAL: ICD-10-CM

## 2024-09-17 RX ORDER — LOSARTAN POTASSIUM 100 MG/1
TABLET ORAL
Qty: 90 TABLET | Refills: 0 | Status: SHIPPED | OUTPATIENT
Start: 2024-09-17

## 2024-10-02 ENCOUNTER — OFFICE VISIT (OUTPATIENT)
Dept: CARDIOLOGY CLINIC | Facility: CLINIC | Age: 79
End: 2024-10-02
Payer: COMMERCIAL

## 2024-10-02 VITALS
RESPIRATION RATE: 16 BRPM | WEIGHT: 209 LBS | DIASTOLIC BLOOD PRESSURE: 82 MMHG | HEIGHT: 72 IN | HEART RATE: 47 BPM | OXYGEN SATURATION: 96 % | BODY MASS INDEX: 28.31 KG/M2 | SYSTOLIC BLOOD PRESSURE: 130 MMHG

## 2024-10-02 DIAGNOSIS — I63.9 CEREBROVASCULAR ACCIDENT (CVA), UNSPECIFIED MECHANISM (HCC): ICD-10-CM

## 2024-10-02 DIAGNOSIS — Z86.73 HISTORY OF CVA (CEREBROVASCULAR ACCIDENT): ICD-10-CM

## 2024-10-02 DIAGNOSIS — D32.9 MENINGIOMA (HCC): ICD-10-CM

## 2024-10-02 DIAGNOSIS — R41.3 MEMORY DIFFICULTY: ICD-10-CM

## 2024-10-02 DIAGNOSIS — I10 HYPERTENSION, ESSENTIAL: ICD-10-CM

## 2024-10-02 DIAGNOSIS — I25.10 CORONARY ARTERY DISEASE INVOLVING NATIVE CORONARY ARTERY OF NATIVE HEART WITHOUT ANGINA PECTORIS: Primary | ICD-10-CM

## 2024-10-02 PROCEDURE — 99213 OFFICE O/P EST LOW 20 MIN: CPT | Performed by: NURSE PRACTITIONER

## 2024-10-02 NOTE — PROGRESS NOTES
Cardiology Office Note    Zay Chance 78 y.o. male MRN: [id number]    10/02/24          Assessment/Plan:    1.  History of nonobstructive CAD  - Denies chest pain, occasionally with mild shortness of breath  -Continue aspirin, metoprolol succinate, pravastatin  - Advised to notify the office if his shortness of breath increases or changes in characteristics    2.  Hypertension  - Well-controlled with amlodipine, losartan, metoprolol succinate  - Continue low-sodium diet    3.  History of CVA  - Continue aspirin, pravastatin    4.  Mild to moderate mitral valve regurgitation  - Continue periodic outpatient surveillance    5.  Dilated ascending aorta  - Measured 4.1 cm in 10/2023    Follow up: 6 months or sooner as needed    1. Coronary artery disease involving native coronary artery of native heart without angina pectoris        2. History of CVA (cerebrovascular accident)        3. Memory difficulty        4. Cerebrovascular accident (CVA), unspecified mechanism (HCC)        5. Meningioma (HCC)        6. Hypertension, essential            HPI: Zay Chance is a 78 y.o. year old male with a past medical history of CAD (mid RCA 40 to 50%, proximal distal LAD 60%), CVA, memory difficulties, hypertension, meningioma, TAA, and peripheral neuropathy presents today for routine follow-up.  He was last seen in this office 1 year ago and at that time was doing well from a cardiac standpoint.  A TTE was performed at that time to evaluate his aortic dilation as well as his mitral valve.  The TTE results showed normal systolic function with an EF of 60% with no regional wall motion abnormalities normal diastolic function, mild to moderate mitral valve regurgitation with aortic root dilation of 4.4 cm and ascending aortic mild dilation of 4.1 cm.    Today he reports overall he is doing well but occasionally has shortness of breath with exertion which she reports is been ongoing and unchanged.  Advised that if this  shortness of breath should become worse or change in characteristics or be accompanied by chest pain he should notify the office right away.  Otherwise, he denies chest pain,  lower extremity edema, lightheadedness, dizziness, syncopal episodes, or palpitations and was instructed to call  the office or seek medical attention if any such symptoms develop.     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)    CVA (cerebral vascular accident) (HCC)    Meningioma (HCC)    Memory difficulty       No Known Allergies      Current Outpatient Medications:     amLODIPine (NORVASC) 10 mg tablet, every morning  , Disp: , Rfl:     aspirin (ECOTRIN LOW STRENGTH) 81 mg EC tablet, Take 1 tablet by mouth daily, Disp: , Rfl:     D3-50 1.25 MG (33618 UT) capsule, Take 50,000 Units by mouth once a week, Disp: , Rfl:     gabapentin (NEURONTIN) 300 mg capsule, Take 1 capsule (300 mg total) by mouth 2 (two) times a day, Disp: 180 capsule, Rfl: 3    glipiZIDE (GLUCOTROL XL) 2.5 mg 24 hr tablet, Take 1 tablet by mouth daily, Disp: , Rfl:     Jardiance 10 MG TABS, Take 10 mg by mouth daily, Disp: , Rfl:     losartan (COZAAR) 100 MG tablet, take 1 tablet by mouth once daily, Disp: 90 tablet, Rfl: 0    Lumigan 0.01 % ophthalmic drops, instill 1 drop into both eyes once daily at bedtime, Disp: , Rfl:     meloxicam (MOBIC) 15 mg tablet, Take 15 mg by mouth if needed, Disp: , Rfl:     methocarbamol (ROBAXIN) 500 mg tablet, as needed, Disp: , Rfl:     metoprolol succinate (TOPROL-XL) 50 mg 24 hr tablet, Take 1 tablet (50 mg total) by mouth 2 (two) times a day, Disp: 180 tablet,  Rfl: 3    ONETOUCH DELICA LANCETS 33G MISC, use ONE LANCET twice a day, Disp: , Rfl: 0    ONETOUCH VERIO test strip, TEST twice a day, Disp: , Rfl: 0    pravastatin (PRAVACHOL) 10 mg tablet, Take 10 mg by mouth daily, Disp: , Rfl:     sildenafil (VIAGRA) 50 MG tablet, Take 50 mg by mouth daily as needed  , Disp: , Rfl:     sitaGLIPtin (JANUVIA) 100 mg tablet, Take 1 tablet by mouth daily, Disp: , Rfl:     Past Medical History:   Diagnosis Date    Anxiety     Bradycardia     Cerebral infarction (HCC)     Diabetes mellitus (HCC)     Diabetic neuropathy (HCC)     Dilated aortic root (HCC)     Essential hypertension     Exercise-induced shortness of breath     Hypertension        Family History   Problem Relation Age of Onset    Ovarian cancer Mother     Cancer Sister     No Known Problems Father     No Known Problems Brother     No Known Problems Maternal Grandmother     No Known Problems Maternal Grandfather     No Known Problems Paternal Grandmother     No Known Problems Paternal Grandfather     No Known Problems Brother     No Known Problems Sister     No Known Problems Sister     No Known Problems Son     No Known Problems Son     No Known Problems Daughter     No Known Problems Daughter     No Known Problems Daughter        Past Surgical History:   Procedure Laterality Date    APPENDECTOMY         Social History     Socioeconomic History    Marital status:      Spouse name: Not on file    Number of children: Not on file    Years of education: Not on file    Highest education level: Not on file   Occupational History    Occupation: Retired   Tobacco Use    Smoking status: Former     Current packs/day: 0.00     Types: Cigarettes     Quit date:      Years since quittin.7    Smokeless tobacco: Never   Vaping Use    Vaping status: Never Used   Substance and Sexual Activity    Alcohol use: Yes     Alcohol/week: 3.0 - 4.0 standard drinks of alcohol     Types: 1 - 2 Cans of beer, 1 Glasses of wine, 1 Shots  "of liquor per week     Comment: occasionally    Drug use: Not Currently     Types: Marijuana     Comment: Quit in 1970    Sexual activity: Not on file   Other Topics Concern    Not on file   Social History Narrative    Not on file     Social Determinants of Health     Financial Resource Strain: Not on file   Food Insecurity: Not on file   Transportation Needs: Not on file   Physical Activity: Not on file   Stress: Not on file   Social Connections: Not on file   Intimate Partner Violence: Not on file   Housing Stability: Not on file       Review of symptoms:   Constitution:  Negative  HEENT:  Negative  Cardiovascular:  Negative  Respiratory:  Negative  Skin:  Negative  Gastrointestinal:  Negative  Genitourinary:  Negative  Musculoskeletal:  Negative  Neurological:  Negative  Endocrine:  Negative  Psychological:  Negative    Vitals: /82 (BP Location: Left arm, Patient Position: Sitting, Cuff Size: Standard)   Pulse (!) 47   Resp 16   Ht 5' 11.5\" (1.816 m)   Wt 94.8 kg (209 lb)   SpO2 96%   BMI 28.74 kg/m²         Physical Exam:     GEN: Alert and oriented x 3, in no acute distress.  Well appearing and well nourished.   HEENT: Sclera anicteric, conjunctivae pink, mucous membranes moist.   NECK: Supple, no carotid bruits, no significant JVD. Trachea midline.  HEART: Regular rhythm, normal S1 and S2, no murmurs, clicks, gallops or rubs.   LUNGS: Clear to auscultation bilaterally; no wheezes, rales, or rhonchi. No increased work of breathing or signs of respiratory distress.   ABDOMEN: Soft, nontender, nondistended, normoactive bowel sounds.   EXTREMITIES: Skin warm and well perfused, no clubbing, cyanosis, or edema.  NEURO: No focal findings. Normal speech. Mood and affect normal.   SKIN: Normal without suspicious lesions on exposed skin.    "

## 2024-10-14 ENCOUNTER — TELEPHONE (OUTPATIENT)
Dept: NEUROLOGY | Facility: CLINIC | Age: 79
End: 2024-10-14

## 2024-10-14 NOTE — TELEPHONE ENCOUNTER
Spoke to patient regarding Neuropsych testing. He said that he did it but did not remember where he did it. I called [facility] and was told results were faxed in August but we did not receive results so I asked them to be re faxed.

## 2024-10-25 ENCOUNTER — OFFICE VISIT (OUTPATIENT)
Dept: NEUROLOGY | Facility: CLINIC | Age: 79
End: 2024-10-25
Payer: COMMERCIAL

## 2024-10-25 VITALS
SYSTOLIC BLOOD PRESSURE: 150 MMHG | DIASTOLIC BLOOD PRESSURE: 80 MMHG | HEART RATE: 51 BPM | BODY MASS INDEX: 28.74 KG/M2 | OXYGEN SATURATION: 98 % | HEIGHT: 72 IN

## 2024-10-25 DIAGNOSIS — E11.42 DIABETIC PERIPHERAL NEUROPATHY (HCC): ICD-10-CM

## 2024-10-25 DIAGNOSIS — D32.9 MENINGIOMA (HCC): ICD-10-CM

## 2024-10-25 DIAGNOSIS — R41.3 MEMORY DIFFICULTY: ICD-10-CM

## 2024-10-25 DIAGNOSIS — Z86.73 HISTORY OF CVA (CEREBROVASCULAR ACCIDENT): Primary | ICD-10-CM

## 2024-10-25 PROCEDURE — 99214 OFFICE O/P EST MOD 30 MIN: CPT | Performed by: PSYCHIATRY & NEUROLOGY

## 2024-10-25 NOTE — ASSESSMENT & PLAN NOTE
Patient does have mild cognitive impairment with an initial MoCA of 17/30 repeat one  was 21/30 he is not keen to go on any medications like donepezil or rivastigmine or memantine, he is not keen to go for cognitive therapy as he did in the past and discontinued to do mentally stimulating exercises at home, advised him the importance of continue with mentally stimulating exercises and to eat a healthy nutritious diet and to take a multivitamin every day, he has followed up with an ID specialist regarding his positive RPR and FTA antibody in the past. to go to the hospital if has any worsening symptoms call me otherwise to see me back in 6 months or sooner if needed and follow-up with the other physicians.

## 2024-10-25 NOTE — ASSESSMENT & PLAN NOTE
Lab Results   Component Value Date    HGBA1C 7.4 (H) 04/27/2024          He was advised to keep his hemoglobin A1c close to 6 and to continue with gabapentin 300 mg twice a day and to take fall and safety precautions, and agement as per PCP.

## 2024-10-25 NOTE — ASSESSMENT & PLAN NOTE
Patient is in follow-up with neurosurgery and is having a yearly surveillance of the MRI scan for the 5 years

## 2024-10-25 NOTE — PROGRESS NOTES
Neurology Ambulatory Visit  Name: Zay Chance       : 1945       MRN: [id number]   Encounter Provider: Brando Turner MD   Encounter Date: 10/25/2024  Encounter department: [facility]      Zay Chance is a 78 y.o. male.       Assessment & Plan  History of CVA (cerebrovascular accident)       Patient was advised to continue with aspirin and statin, stroke education given to the patient and was advised to keep his blood pressure, cholesterol, sugar under control, to go to the hospital if has any strokelike symptoms and call us otherwise to see me back in 6 months or sooner if needed and follow-up with the other physicians.  Memory difficulty       Patient does have mild cognitive impairment with an initial MoCA of  repeat one  was  he is not keen to go on any medications like donepezil or rivastigmine or memantine, he is not keen to go for cognitive therapy as he did in the past and discontinued to do mentally stimulating exercises at home, advised him the importance of continue with mentally stimulating exercises and to eat a healthy nutritious diet and to take a multivitamin every day, he has followed up with an ID specialist regarding his positive RPR and FTA antibody in the past. to go to the hospital if has any worsening symptoms call me otherwise to see me back in 6 months or sooner if needed and follow-up with the other physicians.  Meningioma (HCC)       Patient is in follow-up with neurosurgery and is having a yearly surveillance of the MRI scan for the 5 years  Diabetic peripheral neuropathy (HCC)    Lab Results   Component Value Date    HGBA1C 7.4 (H) 2024          He was advised to keep his hemoglobin A1c close to 6 and to continue with gabapentin 300 mg twice a day and to take fall and safety precautions, and agement as per PCP.      Subjective:  Chief Complaint   Patient presents with    History of CVA (cerebrovascular accident)     Peripheral Neuropathy    memory difficulty       HISTORY OF PRESENT ILLNESS  Patient is here in follow-up for his history of CVA without any residual deficits, since his last visit he tells me that he is doing good, he thinks his memory is stable, denies any dizziness, his neuropathy symptoms in the feet are under control, appetite is good, weight has been stable, he is able to do his ADLs manage his finances able to drive without any issues he lives by himself, mood is good sleep is good no other complaints.          Prior Work-up:   MRI brain with and without contrast from 2024 right frontal convexity meningioma badgering 1.7 to 0.7 cm chronic left frontal left basal ganglia and right cerebellar lacunar infarcts       Past Medical History:    Past Medical History:   Diagnosis Date    Anxiety     Bradycardia     Cerebral infarction (HCC)     Diabetes mellitus (HCC)     Diabetic neuropathy (HCC)     Dilated aortic root (HCC)     Essential hypertension     Exercise-induced shortness of breath     Hypertension      Past Surgical History:   Procedure Laterality Date    APPENDECTOMY         Family History:  Family History   Problem Relation Age of Onset    Ovarian cancer Mother     Cancer Sister     No Known Problems Father     No Known Problems Brother     No Known Problems Maternal Grandmother     No Known Problems Maternal Grandfather     No Known Problems Paternal Grandmother     No Known Problems Paternal Grandfather     No Known Problems Brother     No Known Problems Sister     No Known Problems Sister     No Known Problems Son     No Known Problems Son     No Known Problems Daughter     No Known Problems Daughter     No Known Problems Daughter        Social History:  Social History     Tobacco Use    Smoking status: Former     Current packs/day: 0.00     Types: Cigarettes     Quit date:      Years since quittin.8    Smokeless tobacco: Never   Vaping Use    Vaping status: Never Used   Substance Use  "Topics    Alcohol use: Yes     Alcohol/week: 3.0 - 4.0 standard drinks of alcohol     Types: 1 - 2 Cans of beer, 1 Glasses of wine, 1 Shots of liquor per week     Comment: occasionally    Drug use: Not Currently     Types: Marijuana     Comment: Quit in 1970      Living situation:    Work:      Allergies:  No Known Allergies    Medications:    Current Outpatient Medications:     amLODIPine (NORVASC) 10 mg tablet, every morning  , Disp: , Rfl:     aspirin (ECOTRIN LOW STRENGTH) 81 mg EC tablet, Take 1 tablet by mouth daily, Disp: , Rfl:     D3-50 1.25 MG (52347 UT) capsule, Take 50,000 Units by mouth once a week, Disp: , Rfl:     gabapentin (NEURONTIN) 300 mg capsule, Take 1 capsule (300 mg total) by mouth 2 (two) times a day, Disp: 180 capsule, Rfl: 3    glipiZIDE (GLUCOTROL XL) 2.5 mg 24 hr tablet, Take 1 tablet by mouth daily, Disp: , Rfl:     Jardiance 10 MG TABS, Take 10 mg by mouth daily, Disp: , Rfl:     losartan (COZAAR) 100 MG tablet, take 1 tablet by mouth once daily, Disp: 90 tablet, Rfl: 0    Lumigan 0.01 % ophthalmic drops, instill 1 drop into both eyes once daily at bedtime, Disp: , Rfl:     meloxicam (MOBIC) 15 mg tablet, Take 15 mg by mouth if needed, Disp: , Rfl:     methocarbamol (ROBAXIN) 500 mg tablet, as needed, Disp: , Rfl:     metoprolol succinate (TOPROL-XL) 50 mg 24 hr tablet, Take 1 tablet (50 mg total) by mouth 2 (two) times a day, Disp: 180 tablet, Rfl: 3    ONETOUCH DELICA LANCETS 33G MISC, use ONE LANCET twice a day, Disp: , Rfl: 0    ONETOUCH VERIO test strip, TEST twice a day, Disp: , Rfl: 0    pravastatin (PRAVACHOL) 10 mg tablet, Take 10 mg by mouth daily, Disp: , Rfl:     sildenafil (VIAGRA) 50 MG tablet, Take 50 mg by mouth daily as needed  , Disp: , Rfl:     sitaGLIPtin (JANUVIA) 100 mg tablet, Take 1 tablet by mouth daily, Disp: , Rfl:     Objective:  /80 (BP Location: Left arm, Patient Position: Sitting, Cuff Size: Large)   Pulse (!) 51   Ht 5' 11.5\" (1.816 m)   SpO2 " 98%   BMI 28.74 kg/m²      Labs  I have reviewed pertinent labs:  CBC:   Lab Results   Component Value Date    WBC 2.96 (L) 06/18/2021    RBC 6.12 (H) 06/18/2021    HGB 16.9 06/18/2021    HCT 53.3 (H) 06/18/2021    MCV 87 06/18/2021     06/18/2021    MCH 27.6 06/18/2021    MCHC 31.7 06/18/2021    RDW 14.6 06/18/2021    MPV 11.1 06/18/2021    NEUTROABS 1.08 (L) 06/18/2021     CMP:   Lab Results   Component Value Date    SODIUM 139 09/17/2024    K 4.3 09/17/2024     09/17/2024    CO2 26 09/17/2024    AGAP 7 09/17/2024    BUN 17 09/17/2024    CREATININE 1.17 09/17/2024    GLUC 174 (H) 09/17/2024    GLUF 112 (H) 06/18/2021    CALCIUM 9.1 09/17/2024    AST 14 05/08/2023    ALT 15 05/08/2023    ALKPHOS 59 05/08/2023    TP 6.9 05/08/2023    ALB 4.3 05/08/2023    TBILI 0.7 05/08/2023    EGFR 64 09/17/2024     Vitamin B12   Lab Results   Component Value Date    CFIRCDAM52 442 10/05/2023     Folate   Lab Results   Component Value Date    FOLATE >22.3 10/05/2023              General Exam  GENERAL APPEARANCE:  No distress, alert, interactive and cooperative.  CARDIOVASCULAR: Warm and well perfused  LUNGS: normal work of breathing on room air  EXTREMITIES: no peripheral edema     Neurologic Exam  MENTAL STATE:  Orientation was normal to time, place and person without aphasia or apraxia.  Initial MoCA was 17/30 repeat one  was 21/30.     CRANIAL NERVES:  CN 2       visual fields full to confrontation.  CN 3, 4, 6  Pupils round, 4 mm in diameter, equally reactive to light. Lids symmetric; no ptosis. EOMs normal alignment, full range. No nystagmus.  CN 5  Facial sensation intact bilaterally.  CN 7  Normal and symmetric facial strength.   CN 8  Hearing intact to finger rub bilaterally.  CN 9  Palate elevates symmetrically.  CN 11  Normal strength of shoulder shrug and neck turning.  CN 12  Tongue midline, with normal bulk and strength.     MOTOR:  Motor exam was normal. Muscle bulk and tone were normal in both upper  and lower extremities. Muscle strength was 5/5 in distal and proximal muscles in both upper and lower extremities. No fasciculations, tremor or other abnormal movements were present.     REFLEXES:  RIGHT UE   LEFT UE  BR:2              BR:2    Biceps:2      Biceps:2    Triceps:2     Triceps:2       RIGHT LLE   LEFT LLE    Knee:2           Knee:2    Ankle:2         Ankle:2         SENSORY:  Decreased light touch pinprick temperature sensation in a stocking distribution. Cortical function is intact.    COORDINATION:   Coordination exam was normal. In both upper extremities, finger-nose-finger was intact without dysmetria or overshoot.      GAIT:  Gait was normal. Station was stable with a normal base. Gait was stable with a normal arm swing and speed. Tandem gait was intact. No Romberg sign was present.      ROS:  Review of Systems   Constitutional:  Negative for appetite change, fatigue and fever.   HENT: Negative.  Negative for hearing loss, tinnitus, trouble swallowing and voice change.    Eyes: Negative.  Negative for photophobia, pain and visual disturbance.   Respiratory: Negative.  Negative for shortness of breath.    Cardiovascular: Negative.  Negative for palpitations.   Gastrointestinal: Negative.  Negative for nausea and vomiting.   Endocrine: Negative.  Negative for cold intolerance.   Genitourinary: Negative.  Negative for dysuria, frequency and urgency.   Musculoskeletal:  Negative for back pain, gait problem, myalgias, neck pain and neck stiffness.   Skin: Negative.  Negative for rash.   Allergic/Immunologic: Negative.    Neurological: Negative.  Negative for dizziness, tremors, seizures, syncope, facial asymmetry, speech difficulty, weakness, light-headedness, numbness and headaches.   Hematological: Negative.  Does not bruise/bleed easily.   Psychiatric/Behavioral: Negative.  Negative for confusion, hallucinations and sleep disturbance.        I have reviewed the past medical history, surgical  history, social and family history, current medications, allergies vitals, review of systems, and updated this information as appropriate today.    Administrative Statements   The total amount of time spent with the patient and on chart review and documentation was 25 minutes. Issues addressed during this clinic visit included overall management, medication counseling or monitoring, and counseling and coordination of care.         Brando Turner MD

## 2024-10-25 NOTE — ASSESSMENT & PLAN NOTE
Patient was advised to continue with aspirin and statin, stroke education given to the patient and was advised to keep his blood pressure, cholesterol, sugar under control, to go to the hospital if has any strokelike symptoms and call us otherwise to see me back in 6 months or sooner if needed and follow-up with the other physicians.

## 2024-12-24 ENCOUNTER — APPOINTMENT (EMERGENCY)
Dept: CT IMAGING | Facility: HOSPITAL | Age: 79
End: 2024-12-24
Payer: COMMERCIAL

## 2024-12-24 ENCOUNTER — HOSPITAL ENCOUNTER (EMERGENCY)
Facility: HOSPITAL | Age: 79
Discharge: HOME | End: 2024-12-24
Attending: EMERGENCY MEDICINE
Payer: COMMERCIAL

## 2024-12-24 VITALS
BODY MASS INDEX: 30.26 KG/M2 | WEIGHT: 220 LBS | TEMPERATURE: 98.3 F | RESPIRATION RATE: 13 BRPM | OXYGEN SATURATION: 97 % | DIASTOLIC BLOOD PRESSURE: 72 MMHG | HEART RATE: 45 BPM | SYSTOLIC BLOOD PRESSURE: 132 MMHG

## 2024-12-24 DIAGNOSIS — N20.0 KIDNEY STONE ON LEFT SIDE: ICD-10-CM

## 2024-12-24 DIAGNOSIS — I10 HYPERTENSION, ESSENTIAL: ICD-10-CM

## 2024-12-24 DIAGNOSIS — R10.9 LEFT FLANK PAIN: ICD-10-CM

## 2024-12-24 DIAGNOSIS — R11.0 NAUSEA: ICD-10-CM

## 2024-12-24 DIAGNOSIS — N20.0 NEPHROLITHIASIS: Primary | ICD-10-CM

## 2024-12-24 LAB
ALBUMIN SERPL BCG-MCNC: 4.2 G/DL (ref 3.5–5)
ALP SERPL-CCNC: 62 U/L (ref 34–104)
ALT SERPL W P-5'-P-CCNC: 14 U/L (ref 7–52)
ANION GAP SERPL CALCULATED.3IONS-SCNC: 6 MMOL/L (ref 4–13)
AST SERPL W P-5'-P-CCNC: 13 U/L (ref 13–39)
ATRIAL RATE: 47 BPM
BACTERIA UR QL AUTO: NORMAL /HPF
BASOPHILS # BLD AUTO: 0.02 THOUSANDS/ÂΜL (ref 0–0.1)
BASOPHILS NFR BLD AUTO: 0 % (ref 0–1)
BILIRUB SERPL-MCNC: 0.58 MG/DL (ref 0.2–1)
BILIRUB UR QL STRIP: NEGATIVE
BUN SERPL-MCNC: 18 MG/DL (ref 5–25)
CALCIUM SERPL-MCNC: 9.8 MG/DL (ref 8.4–10.2)
CHLORIDE SERPL-SCNC: 107 MMOL/L (ref 96–108)
CLARITY UR: CLEAR
CO2 SERPL-SCNC: 26 MMOL/L (ref 21–32)
COLOR UR: ABNORMAL
CREAT SERPL-MCNC: 1.33 MG/DL (ref 0.6–1.3)
EOSINOPHIL # BLD AUTO: 0.02 THOUSAND/ÂΜL (ref 0–0.61)
EOSINOPHIL NFR BLD AUTO: 0 % (ref 0–6)
ERYTHROCYTE [DISTWIDTH] IN BLOOD BY AUTOMATED COUNT: 13.5 % (ref 11.6–15.1)
GFR SERPL CREATININE-BSD FRML MDRD: 51 ML/MIN/1.73SQ M
GLUCOSE SERPL-MCNC: 155 MG/DL (ref 65–140)
GLUCOSE SERPL-MCNC: 161 MG/DL (ref 65–140)
GLUCOSE UR STRIP-MCNC: ABNORMAL MG/DL
HCT VFR BLD AUTO: 47.8 % (ref 36.5–49.3)
HGB BLD-MCNC: 15.3 G/DL (ref 12–17)
HGB UR QL STRIP.AUTO: NEGATIVE
IMM GRANULOCYTES # BLD AUTO: 0.01 THOUSAND/UL (ref 0–0.2)
IMM GRANULOCYTES NFR BLD AUTO: 0 % (ref 0–2)
KETONES UR STRIP-MCNC: NEGATIVE MG/DL
LEUKOCYTE ESTERASE UR QL STRIP: ABNORMAL
LIPASE SERPL-CCNC: 12 U/L (ref 11–82)
LYMPHOCYTES # BLD AUTO: 1.99 THOUSANDS/ÂΜL (ref 0.6–4.47)
LYMPHOCYTES NFR BLD AUTO: 36 % (ref 14–44)
MCH RBC QN AUTO: 27.3 PG (ref 26.8–34.3)
MCHC RBC AUTO-ENTMCNC: 32 G/DL (ref 31.4–37.4)
MCV RBC AUTO: 85 FL (ref 82–98)
MONOCYTES # BLD AUTO: 0.4 THOUSAND/ÂΜL (ref 0.17–1.22)
MONOCYTES NFR BLD AUTO: 7 % (ref 4–12)
NEUTROPHILS # BLD AUTO: 3.02 THOUSANDS/ÂΜL (ref 1.85–7.62)
NEUTS SEG NFR BLD AUTO: 57 % (ref 43–75)
NITRITE UR QL STRIP: NEGATIVE
NON-SQ EPI CELLS URNS QL MICRO: NORMAL /HPF
NRBC BLD AUTO-RTO: 0 /100 WBCS
P AXIS: 53 DEGREES
PH UR STRIP.AUTO: 6.5 [PH]
PLATELET # BLD AUTO: 133 THOUSANDS/UL (ref 149–390)
PMV BLD AUTO: 10.8 FL (ref 8.9–12.7)
POTASSIUM SERPL-SCNC: 4.2 MMOL/L (ref 3.5–5.3)
PR INTERVAL: 226 MS
PROT SERPL-MCNC: 7.1 G/DL (ref 6.4–8.4)
PROT UR STRIP-MCNC: NEGATIVE MG/DL
QRS AXIS: 4 DEGREES
QRSD INTERVAL: 90 MS
QT INTERVAL: 460 MS
QTC INTERVAL: 407 MS
RBC # BLD AUTO: 5.61 MILLION/UL (ref 3.88–5.62)
RBC #/AREA URNS AUTO: NORMAL /HPF
SODIUM SERPL-SCNC: 139 MMOL/L (ref 135–147)
SP GR UR STRIP.AUTO: 1.03 (ref 1–1.03)
T WAVE AXIS: -5 DEGREES
UROBILINOGEN UR STRIP-ACNC: <2 MG/DL
VENTRICULAR RATE: 47 BPM
WBC # BLD AUTO: 5.46 THOUSAND/UL (ref 4.31–10.16)
WBC #/AREA URNS AUTO: NORMAL /HPF

## 2024-12-24 PROCEDURE — 74176 CT ABD & PELVIS W/O CONTRAST: CPT

## 2024-12-24 PROCEDURE — 96361 HYDRATE IV INFUSION ADD-ON: CPT

## 2024-12-24 PROCEDURE — 36415 COLL VENOUS BLD VENIPUNCTURE: CPT | Performed by: EMERGENCY MEDICINE

## 2024-12-24 PROCEDURE — 83690 ASSAY OF LIPASE: CPT | Performed by: EMERGENCY MEDICINE

## 2024-12-24 PROCEDURE — 99284 EMERGENCY DEPT VISIT MOD MDM: CPT

## 2024-12-24 PROCEDURE — 96374 THER/PROPH/DIAG INJ IV PUSH: CPT

## 2024-12-24 PROCEDURE — 80053 COMPREHEN METABOLIC PANEL: CPT | Performed by: EMERGENCY MEDICINE

## 2024-12-24 PROCEDURE — 82948 REAGENT STRIP/BLOOD GLUCOSE: CPT

## 2024-12-24 PROCEDURE — 96375 TX/PRO/DX INJ NEW DRUG ADDON: CPT

## 2024-12-24 PROCEDURE — 85025 COMPLETE CBC W/AUTO DIFF WBC: CPT | Performed by: EMERGENCY MEDICINE

## 2024-12-24 PROCEDURE — 99284 EMERGENCY DEPT VISIT MOD MDM: CPT | Performed by: EMERGENCY MEDICINE

## 2024-12-24 PROCEDURE — 81001 URINALYSIS AUTO W/SCOPE: CPT | Performed by: EMERGENCY MEDICINE

## 2024-12-24 PROCEDURE — 93010 ELECTROCARDIOGRAM REPORT: CPT | Performed by: INTERNAL MEDICINE

## 2024-12-24 PROCEDURE — 93005 ELECTROCARDIOGRAM TRACING: CPT

## 2024-12-24 RX ORDER — MORPHINE SULFATE 4 MG/ML
4 INJECTION, SOLUTION INTRAMUSCULAR; INTRAVENOUS ONCE
Status: COMPLETED | OUTPATIENT
Start: 2024-12-24 | End: 2024-12-24

## 2024-12-24 RX ORDER — ONDANSETRON 4 MG/1
4 TABLET, ORALLY DISINTEGRATING ORAL EVERY 6 HOURS PRN
Qty: 12 TABLET | Refills: 0 | Status: SHIPPED | OUTPATIENT
Start: 2024-12-24

## 2024-12-24 RX ORDER — KETOROLAC TROMETHAMINE 30 MG/ML
15 INJECTION, SOLUTION INTRAMUSCULAR; INTRAVENOUS ONCE
Status: COMPLETED | OUTPATIENT
Start: 2024-12-24 | End: 2024-12-24

## 2024-12-24 RX ORDER — LOSARTAN POTASSIUM 100 MG/1
100 TABLET ORAL DAILY
Qty: 90 TABLET | Refills: 3 | Status: SHIPPED | OUTPATIENT
Start: 2024-12-24

## 2024-12-24 RX ORDER — ONDANSETRON 2 MG/ML
4 INJECTION INTRAMUSCULAR; INTRAVENOUS ONCE
Status: COMPLETED | OUTPATIENT
Start: 2024-12-24 | End: 2024-12-24

## 2024-12-24 RX ORDER — TAMSULOSIN HYDROCHLORIDE 0.4 MG/1
0.4 CAPSULE ORAL
Qty: 7 CAPSULE | Refills: 0 | Status: SHIPPED | OUTPATIENT
Start: 2024-12-24 | End: 2024-12-31

## 2024-12-24 RX ORDER — OXYCODONE HYDROCHLORIDE 5 MG/1
5 TABLET ORAL EVERY 6 HOURS PRN
Qty: 8 TABLET | Refills: 0 | Status: SHIPPED | OUTPATIENT
Start: 2024-12-24 | End: 2025-01-03

## 2024-12-24 RX ORDER — METOPROLOL SUCCINATE 50 MG/1
50 TABLET, EXTENDED RELEASE ORAL 2 TIMES DAILY
Qty: 180 TABLET | Refills: 3 | Status: SHIPPED | OUTPATIENT
Start: 2024-12-24

## 2024-12-24 RX ADMIN — KETOROLAC TROMETHAMINE 15 MG: 30 INJECTION, SOLUTION INTRAMUSCULAR at 11:49

## 2024-12-24 RX ADMIN — ONDANSETRON 4 MG: 2 INJECTION INTRAMUSCULAR; INTRAVENOUS at 11:52

## 2024-12-24 RX ADMIN — MORPHINE SULFATE 4 MG: 4 INJECTION INTRAVENOUS at 11:53

## 2024-12-24 RX ADMIN — SODIUM CHLORIDE 1000 ML: 0.9 INJECTION, SOLUTION INTRAVENOUS at 11:53

## 2024-12-24 NOTE — Clinical Note
Elda Haile accompanied Zay Chance to the emergency department on 12/24/2024.    Return date if applicable: 12/26/2024        If you have any questions or concerns, please don't hesitate to call.      Arias Jasso MD

## 2024-12-24 NOTE — ED PROVIDER NOTES
Time reflects when diagnosis was documented in both MDM as applicable and the Disposition within this note       Time User Action Codes Description Comment    12/24/2024 11:29 AM Erne, Raias Add [R10.9] Left flank pain     12/24/2024 11:29 AM Erne, Arias Add [R11.0] Nausea     12/24/2024  1:54 PM Erne, Arias Add [N20.0] Nephrolithiasis     12/24/2024  1:54 PM Erne, Arias Modify [R10.9] Left flank pain     12/24/2024  1:54 PM Erne, Arias Modify [N20.0] Nephrolithiasis     12/24/2024  1:54 PM Erne, Arias Add [N20.0] Kidney stone on left side           ED Disposition       ED Disposition   Discharge    Condition   Stable    Date/Time   Tue Dec 24, 2024  1:53 PM    Comment   Zay Chance discharge to home/self care.                   Assessment & Plan       Medical Decision Making  79-year-old male history of hypertension, diabetes, stroke on aspirin presenting with left-sided flank pain.  Symptoms and exam concerning for possible intra-abdominal process such as kidney stone.  Plan for CT imaging.  Labs including abdominal labs.  UA.  Symptom management with IV pain and nausea medication plus fluids.  Reassess.    Labs interpreted by me without significant acute process.  Urine okay.  Symptoms significant proved with medications.  CT imaging notable for 6 mm left UVJ stone.  Prescription sent to pharmacy.  Urology referral. Discussed results and recommendations. Advised follow up PCP and urology. Medication recommendations. Given instructions and return precautions. Patient/family at bedside acknowledged understanding of all written and verbal instructions and return precautions. Discharged.     Amount and/or Complexity of Data Reviewed  Labs: ordered.  Radiology: ordered.    Risk  Prescription drug management.             Medications   sodium chloride 0.9 % bolus 1,000 mL (0 mL Intravenous Stopped 12/24/24 1419)   ketorolac (TORADOL) injection 15 mg (15 mg Intravenous Given 12/24/24 1149)   morphine injection 4 mg  (4 mg Intravenous Given 12/24/24 1153)   ondansetron (ZOFRAN) injection 4 mg (4 mg Intravenous Given 12/24/24 1152)       ED Risk Strat Scores                          SBIRT 20yo+      Flowsheet Row Most Recent Value   Initial Alcohol Screen: US AUDIT-C     1. How often do you have a drink containing alcohol? 0 Filed at: 12/24/2024 1109   2. How many drinks containing alcohol do you have on a typical day you are drinking?  0 Filed at: 12/24/2024 1109   3a. Male UNDER 65: How often do you have five or more drinks on one occasion? 0 Filed at: 12/24/2024 1109   Audit-C Score 0 Filed at: 12/24/2024 1109   AVELINO: How many times in the past year have you...    Used an illegal drug or used a prescription medication for non-medical reasons? Never Filed at: 12/24/2024 1109                            History of Present Illness       Chief Complaint   Patient presents with    Flank Pain     PT BIBA from home with left flank pain more towards the back. Had similar in past and had kidney stones. Also accompanied by n/v       Past Medical History:   Diagnosis Date    Anxiety     Bradycardia     Cerebral infarction (HCC)     Diabetes mellitus (HCC)     Diabetic neuropathy (HCC)     Dilated aortic root (HCC)     Essential hypertension     Exercise-induced shortness of breath     Hypertension       Past Surgical History:   Procedure Laterality Date    APPENDECTOMY        Family History   Problem Relation Age of Onset    Ovarian cancer Mother     Cancer Sister     No Known Problems Father     No Known Problems Brother     No Known Problems Maternal Grandmother     No Known Problems Maternal Grandfather     No Known Problems Paternal Grandmother     No Known Problems Paternal Grandfather     No Known Problems Brother     No Known Problems Sister     No Known Problems Sister     No Known Problems Son     No Known Problems Son     No Known Problems Daughter     No Known Problems Daughter     No Known Problems Daughter       Social  History     Tobacco Use    Smoking status: Former     Current packs/day: 0.00     Types: Cigarettes     Quit date:      Years since quittin.0    Smokeless tobacco: Never   Vaping Use    Vaping status: Never Used   Substance Use Topics    Alcohol use: Yes     Alcohol/week: 3.0 - 4.0 standard drinks of alcohol     Types: 1 - 2 Cans of beer, 1 Glasses of wine, 1 Shots of liquor per week     Comment: occasionally    Drug use: Not Currently     Types: Marijuana     Comment: Quit in       E-Cigarette/Vaping    E-Cigarette Use Never User       E-Cigarette/Vaping Substances    Nicotine No     THC No     CBD No     Flavoring No     Other No     Unknown No       I have reviewed and agree with the history as documented.     79-year-old male history of hypertension, diabetes, stroke on aspirin presenting with left-sided flank pain.  Patient reports acute onset of sharp left-sided flank pain beginning about 1 hour ago.  Primarily left back but some radiation around to left flank.  Reports associated nausea without vomiting.  Denies any urinary changes such as pain or bleeding.  Denies any abdominal pain.  Reports history of similar with previous kidney stones.  Denies chest pain shortness of breath.  Denies any other complaints.  Chart reviewed.    Past Medical History:  No date: Anxiety  No date: Bradycardia  No date: Cerebral infarction (HCC)  No date: Diabetes mellitus (HCC)  No date: Diabetic neuropathy (HCC)  No date: Dilated aortic root (HCC)  No date: Essential hypertension  No date: Exercise-induced shortness of breath  No date: Hypertension  Family History: non-contributory  Social History          Review of Systems   Constitutional:  Negative for appetite change, chills, diaphoresis, fever and unexpected weight change.   HENT:  Negative for congestion and rhinorrhea.    Eyes:  Negative for photophobia and visual disturbance.   Respiratory:  Negative for cough, chest tightness and shortness of breath.     Cardiovascular:  Negative for chest pain, palpitations and leg swelling.   Gastrointestinal:  Positive for nausea. Negative for abdominal distention, abdominal pain, blood in stool, constipation, diarrhea and vomiting.   Genitourinary:  Positive for flank pain. Negative for dysuria and hematuria.   Musculoskeletal:  Positive for back pain. Negative for joint swelling, neck pain and neck stiffness.   Skin:  Negative for color change, pallor, rash and wound.   Neurological:  Negative for dizziness, syncope, weakness, light-headedness and headaches.   Psychiatric/Behavioral:  Negative for agitation.    All other systems reviewed and are negative.          Objective       ED Triage Vitals [12/24/24 1105]   Temperature Pulse Blood Pressure Respirations SpO2 Patient Position - Orthostatic VS   98.3 °F (36.8 °C) (!) 47 163/82 16 99 % Lying      Temp Source Heart Rate Source BP Location FiO2 (%) Pain Score    Oral Monitor Right arm -- 10 - Worst Possible Pain      Vitals      Date and Time Temp Pulse SpO2 Resp BP Pain Score FACES Pain Rating User   12/24/24 1330 -- 45 97 % 13 132/72 -- -- FB   12/24/24 1300 -- 44 98 % 15 153/83 -- -- FB   12/24/24 1245 -- 46 98 % 16 129/82 -- -- FB   12/24/24 1200 -- 46 99 % 20 131/62 -- -- FB   12/24/24 1149 -- -- -- -- -- 10 - Worst Possible Pain -- FB   12/24/24 1105 98.3 °F (36.8 °C) 47 99 % 16 163/82 10 - Worst Possible Pain -- FB            Physical Exam  Vitals and nursing note reviewed.   Constitutional:       General: He is not in acute distress.     Appearance: Normal appearance. He is well-developed. He is not ill-appearing, toxic-appearing or diaphoretic.   HENT:      Head: Normocephalic and atraumatic.      Nose: Nose normal. No congestion or rhinorrhea.      Mouth/Throat:      Mouth: Mucous membranes are moist.      Pharynx: Oropharynx is clear. No oropharyngeal exudate or posterior oropharyngeal erythema.   Eyes:      General: No scleral icterus.        Right eye: No  discharge.         Left eye: No discharge.      Extraocular Movements: Extraocular movements intact.      Conjunctiva/sclera: Conjunctivae normal.      Pupils: Pupils are equal, round, and reactive to light.   Neck:      Vascular: No JVD.      Trachea: No tracheal deviation.      Comments: Supple. Normal range of motion.   Cardiovascular:      Rate and Rhythm: Normal rate and regular rhythm.      Heart sounds: Normal heart sounds. No murmur heard.     No friction rub. No gallop.      Comments: Normal rate and regular rhythm  Pulmonary:      Effort: Pulmonary effort is normal. No respiratory distress.      Breath sounds: Normal breath sounds. No stridor. No wheezing or rales.      Comments: Clear to auscultation bilaterally  Chest:      Chest wall: No tenderness.   Abdominal:      General: Bowel sounds are normal. There is no distension.      Palpations: Abdomen is soft.      Tenderness: There is no abdominal tenderness. There is no right CVA tenderness, left CVA tenderness, guarding or rebound.      Comments: Soft, nontender, nondistended.  Normal bowel sounds throughout   Musculoskeletal:         General: No swelling, tenderness, deformity or signs of injury. Normal range of motion.      Cervical back: Normal range of motion and neck supple. No rigidity. No muscular tenderness.      Right lower leg: No edema.      Left lower leg: No edema.   Lymphadenopathy:      Cervical: No cervical adenopathy.   Skin:     General: Skin is warm and dry.      Coloration: Skin is not pale.      Findings: No erythema or rash.   Neurological:      General: No focal deficit present.      Mental Status: He is alert. Mental status is at baseline.      Sensory: No sensory deficit.      Motor: No weakness or abnormal muscle tone.      Coordination: Coordination normal.      Gait: Gait normal.      Comments: Alert.  Strength and sensation grossly intact.  Ambulatory without difficulty at baseline.    Psychiatric:         Behavior: Behavior  normal.         Thought Content: Thought content normal.         Results Reviewed       Procedure Component Value Units Date/Time    Urine Microscopic [[id number]]  (Normal) Collected: 12/24/24 1123    Lab Status: Final result Specimen: Urine, Clean Catch Updated: 12/24/24 1306     RBC, UA 1-2 /hpf      WBC, UA None Seen /hpf      Epithelial Cells Occasional /hpf      Bacteria, UA None Seen /hpf     CBC and differential [[id number]]  (Abnormal) Collected: 12/24/24 1208    Lab Status: Final result Specimen: Blood from Arm, Left Updated: 12/24/24 1218     WBC 5.46 Thousand/uL      RBC 5.61 Million/uL      Hemoglobin 15.3 g/dL      Hematocrit 47.8 %      MCV 85 fL      MCH 27.3 pg      MCHC 32.0 g/dL      RDW 13.5 %      MPV 10.8 fL      Platelets 133 Thousands/uL      nRBC 0 /100 WBCs      Segmented % 57 %      Immature Grans % 0 %      Lymphocytes % 36 %      Monocytes % 7 %      Eosinophils Relative 0 %      Basophils Relative 0 %      Absolute Neutrophils 3.02 Thousands/µL      Absolute Immature Grans 0.01 Thousand/uL      Absolute Lymphocytes 1.99 Thousands/µL      Absolute Monocytes 0.40 Thousand/µL      Eosinophils Absolute 0.02 Thousand/µL      Basophils Absolute 0.02 Thousands/µL     Comprehensive metabolic panel [[id number]]  (Abnormal) Collected: 12/24/24 1149    Lab Status: Final result Specimen: Blood from Arm, Left Updated: 12/24/24 1211     Sodium 139 mmol/L      Potassium 4.2 mmol/L      Chloride 107 mmol/L      CO2 26 mmol/L      ANION GAP 6 mmol/L      BUN 18 mg/dL      Creatinine 1.33 mg/dL      Glucose 161 mg/dL      Calcium 9.8 mg/dL      AST 13 U/L      ALT 14 U/L      Alkaline Phosphatase 62 U/L      Total Protein 7.1 g/dL      Albumin 4.2 g/dL      Total Bilirubin 0.58 mg/dL      eGFR 51 ml/min/1.73sq m     Narrative:      National Kid[employer] guidelines for Chronic Kidney Disease (CKD):     Stage 1 with normal or high GFR (GFR > 90 mL/min/1.73 square meters)    Stage 2 Mild CKD (GFR  = 60-89 mL/min/1.73 square meters)    Stage 3A Moderate CKD (GFR = 45-59 mL/min/1.73 square meters)    Stage 3B Moderate CKD (GFR = 30-44 mL/min/1.73 square meters)    Stage 4 Severe CKD (GFR = 15-29 mL/min/1.73 square meters)    Stage 5 End Stage CKD (GFR <15 mL/min/1.73 square meters)  Note: GFR calculation is accurate only with a steady state creatinine    Lipase [[id number]]  (Normal) Collected: 12/24/24 1149    Lab Status: Final result Specimen: Blood from Arm, Left Updated: 12/24/24 1211     Lipase 12 u/L     UA w Reflex to Microscopic w Reflex to Culture [[id number]]  (Abnormal) Collected: 12/24/24 1123    Lab Status: Final result Specimen: Urine, Clean Catch Updated: 12/24/24 1209     Color, UA Light Yellow     Clarity, UA Clear     Specific Gravity, UA 1.032     pH, UA 6.5     Leukocytes, UA Elevated glucose may cause decreased leukocyte values. See urine microscopic for UWBC result     Nitrite, UA Negative     Protein, UA Negative mg/dl      Glucose, UA >=1000 (1%) mg/dl      Ketones, UA Negative mg/dl      Urobilinogen, UA <2.0 mg/dl      Bilirubin, UA Negative     Occult Blood, UA Negative    Fingerstick Glucose (POCT) [[id number]]  (Abnormal) Collected: 12/24/24 1104    Lab Status: Final result Specimen: Blood Updated: 12/24/24 1104     POC Glucose 155 mg/dl             CT renal stone study abdomen pelvis wo contrast   Final Interpretation by Joanna Hussein DO (12/24 1328)      6 mm obstructing stone near the left vesicoureteral junction, causing mild left hydroureteronephrosis with surrounding perinephric and periureteral stranding. Additional nonobstructing left renal stones measuring up to 3 mm.      Numerous bilateral renal cysts as described.      Small hiatal hernia.      Mild cardiomegaly with severe coronary artery calcifications.      Additional findings as detailed in the body of the report.      The study was marked in EPIC for immediate notification.               Workstation  performed: [id number]             Procedures    ED Medication and Procedure Management   Prior to Admission Medications   Prescriptions Last Dose Informant Patient Reported? Taking?   D3-50 1.25 MG (08518 UT) capsule  Self Yes No   Sig: Take 50,000 Units by mouth once a week   Jardiance 10 MG TABS  Self Yes No   Sig: Take 10 mg by mouth daily   Lumigan 0.01 % ophthalmic drops  Self Yes No   Sig: instill 1 drop into both eyes once daily at bedtime   ONETOUCH DELICA LANCETS 33G MISC  Self Yes No   Sig: use ONE LANCET twice a day   ONETOUCH VERIO test strip  Self Yes No   Sig: TEST twice a day   amLODIPine (NORVASC) 10 mg tablet  Self Yes No   Sig: every morning     aspirin (ECOTRIN LOW STRENGTH) 81 mg EC tablet  Self Yes No   Sig: Take 1 tablet by mouth daily   gabapentin (NEURONTIN) 300 mg capsule  Self No No   Sig: Take 1 capsule (300 mg total) by mouth 2 (two) times a day   glipiZIDE (GLUCOTROL XL) 2.5 mg 24 hr tablet  Self Yes No   Sig: Take 1 tablet by mouth daily   losartan (COZAAR) 100 MG tablet   No No   Sig: Take 1 tablet (100 mg total) by mouth daily   meloxicam (MOBIC) 15 mg tablet  Self Yes No   Sig: Take 15 mg by mouth if needed   methocarbamol (ROBAXIN) 500 mg tablet  Self Yes No   Sig: as needed   metoprolol succinate (TOPROL-XL) 50 mg 24 hr tablet   No No   Sig: Take 1 tablet (50 mg total) by mouth 2 (two) times a day   pravastatin (PRAVACHOL) 10 mg tablet  Self Yes No   Sig: Take 10 mg by mouth daily   sildenafil (VIAGRA) 50 MG tablet  Self Yes No   Sig: Take 50 mg by mouth daily as needed     sitaGLIPtin (JANUVIA) 100 mg tablet  Self Yes No   Sig: Take 1 tablet by mouth daily      Facility-Administered Medications: None     Discharge Medication List as of 12/24/2024  1:57 PM        START taking these medications    Details   oxyCODONE (Roxicodone) 5 immediate release tablet Take 1 tablet (5 mg total) by mouth every 6 (six) hours as needed for severe pain for up to 10 days Max Daily Amount: 20 mg,  Starting Tue 12/24/2024, Until Fri 1/3/2025 at 2359, Normal      tamsulosin (FLOMAX) 0.4 mg Take 1 capsule (0.4 mg total) by mouth daily with dinner for 7 days, Starting Tue 12/24/2024, Until Tue 12/31/2024, Normal      losartan (COZAAR) 100 MG tablet Take 1 tablet (100 mg total) by mouth daily, Starting Tue 12/24/2024, Normal      metoprolol succinate (TOPROL-XL) 50 mg 24 hr tablet Take 1 tablet (50 mg total) by mouth 2 (two) times a day, Starting Tue 12/24/2024, Normal           CONTINUE these medications which have NOT CHANGED    Details   amLODIPine (NORVASC) 10 mg tablet every morning  , Historical Med      aspirin (ECOTRIN LOW STRENGTH) 81 mg EC tablet Take 1 tablet by mouth daily, Historical Med      D3-50 1.25 MG (08373 UT) capsule Take 50,000 Units by mouth once a week, Starting Sat 7/18/2020, Historical Med      gabapentin (NEURONTIN) 300 mg capsule Take 1 capsule (300 mg total) by mouth 2 (two) times a day, Starting Tue 6/4/2024, Normal      glipiZIDE (GLUCOTROL XL) 2.5 mg 24 hr tablet Take 1 tablet by mouth daily, Historical Med      Jardiance 10 MG TABS Take 10 mg by mouth daily, Starting u 5/28/2020, Historical Med      Lumigan 0.01 % ophthalmic drops instill 1 drop into both eyes once daily at bedtime, Historical Med      meloxicam (MOBIC) 15 mg tablet Take 15 mg by mouth if needed, Starting Wed 8/10/2022, Historical Med      methocarbamol (ROBAXIN) 500 mg tablet as needed, Starting Wed 5/22/2024, Historical Med      ONETOUCH DELICA LANCETS 33G MISC use ONE LANCET twice a day, Historical Med      ONETOUCH VERIO test strip TEST twice a day, Historical Med      pravastatin (PRAVACHOL) 10 mg tablet Take 10 mg by mouth daily, Starting Wed 6/3/2020, Historical Med      sildenafil (VIAGRA) 50 MG tablet Take 50 mg by mouth daily as needed  , Starting Tue 11/13/2018, Until Fri 10/25/2024 at 2359, Historical Med      sitaGLIPtin (JANUVIA) 100 mg tablet Take 1 tablet by mouth daily, Historical Med              ED SEPSIS DOCUMENTATION   Time reflects when diagnosis was documented in both MDM as applicable and the Disposition within this note       Time User Action Codes Description Comment    12/24/2024 11:29 AM Erne, Arias Add [R10.9] Left flank pain     12/24/2024 11:29 AM Erne, Arias Add [R11.0] Nausea     12/24/2024  1:54 PM Erne, Arias Add [N20.0] Nephrolithiasis     12/24/2024  1:54 PM Erne, Arias Modify [R10.9] Left flank pain     12/24/2024  1:54 PM Erne, Arias Modify [N20.0] Nephrolithiasis     12/24/2024  1:54 PM Erne, Arias Add [N20.0] Kidney stone on left side                  Arias Jasso MD  12/24/24 [id number]

## 2024-12-24 NOTE — DISCHARGE INSTRUCTIONS
Please follow up PCP and urology.  If taking the strong pain medication oxycodone, please do not drive or operate heavy machinery or perform other dangerous tasks.  Please take Flomax for the next week to help with passing the stone.  Recommend tylenol 650 mg and ibuprofen 600 mg every 6 hours as needed for pain. Please return for severe chest pain, significant shortness of breath, severely worsening symptoms, or any other concerning signs or symptoms. Please refer to the following documents for additional instructions and return precautions.

## 2025-05-14 ENCOUNTER — OFFICE VISIT (OUTPATIENT)
Dept: CARDIOLOGY CLINIC | Facility: CLINIC | Age: 80
End: 2025-05-14
Payer: COMMERCIAL

## 2025-05-14 VITALS
RESPIRATION RATE: 16 BRPM | WEIGHT: 206 LBS | OXYGEN SATURATION: 98 % | DIASTOLIC BLOOD PRESSURE: 74 MMHG | HEART RATE: 51 BPM | BODY MASS INDEX: 27.9 KG/M2 | HEIGHT: 72 IN | SYSTOLIC BLOOD PRESSURE: 110 MMHG

## 2025-05-14 DIAGNOSIS — I25.10 CORONARY ARTERY DISEASE INVOLVING NATIVE CORONARY ARTERY OF NATIVE HEART WITHOUT ANGINA PECTORIS: ICD-10-CM

## 2025-05-14 DIAGNOSIS — N18.31 STAGE 3A CHRONIC KIDNEY DISEASE (HCC): Primary | ICD-10-CM

## 2025-05-14 DIAGNOSIS — I71.21 ANEURYSM OF ASCENDING AORTA WITHOUT RUPTURE (HCC): ICD-10-CM

## 2025-05-14 DIAGNOSIS — R00.1 BRADYCARDIA: ICD-10-CM

## 2025-05-14 DIAGNOSIS — E11.42 DIABETIC PERIPHERAL NEUROPATHY (HCC): ICD-10-CM

## 2025-05-14 DIAGNOSIS — I10 HYPERTENSION, ESSENTIAL: ICD-10-CM

## 2025-05-14 DIAGNOSIS — E78.2 HYPERLIPEMIA, MIXED: ICD-10-CM

## 2025-05-14 PROCEDURE — 99214 OFFICE O/P EST MOD 30 MIN: CPT | Performed by: INTERNAL MEDICINE

## 2025-05-14 PROCEDURE — 93000 ELECTROCARDIOGRAM COMPLETE: CPT | Performed by: INTERNAL MEDICINE

## 2025-05-14 RX ORDER — METOPROLOL SUCCINATE 50 MG/1
TABLET, EXTENDED RELEASE ORAL
Start: 2025-05-14

## 2025-05-14 NOTE — ASSESSMENT & PLAN NOTE
Patient has history of aneurysm of the ascending aorta measuring approximately 4.3 cm.  Follow-up echocardiogram to reassess the same and look for any significant aortic regurgitation.  Previous echocardiogram showed mild aortic regurgitation.  This was in 2023.    CT of the chest to reassess the size of the ascending aortic aneurysm.

## 2025-05-14 NOTE — PROGRESS NOTES
[facility] [place]  [address]  [place] [address]  Cardiology Follow Up    Zay Chance  1945  [id number]      Assessment & Plan  Stage 3a chronic kidney disease (HCC)  Lab Results   Component Value Date    EGFR 51 12/24/2024    EGFR 64 09/17/2024    EGFR 64 06/14/2024    CREATININE 1.33 (H) 12/24/2024    CREATININE 1.17 09/17/2024    CREATININE 1.10 06/14/2024   Follow-up with primary care physician  Diabetic peripheral neuropathy (HCC)    Lab Results   Component Value Date    HGBA1C 7.8 (H) 04/02/2025   Follow-up with primary care physician.  Hypertension, essential  Portance of salt restriction and compliance with present medications reviewed.  Continue diet and risk factor modification.  Aneurysm of ascending aorta without rupture (HCC)  Patient has history of aneurysm of the ascending aorta measuring approximately 4.3 cm.  Follow-up echocardiogram to reassess the same and look for any significant aortic regurgitation.  Previous echocardiogram showed mild aortic regurgitation.  This was in 2023.    CT of the chest to reassess the size of the ascending aortic aneurysm.    Hyperlipemia, mixed  Continue pravastatin.  Continue dietary factor modification.  Coronary artery disease involving native coronary artery of native heart without angina pectoris  Cardiac cath in 2021 showed moderate CAD involving the RCA and LAD.  Continue medical therapy and risk factor modification.    Symptoms to watch out from cardiac standpoint which would indicate the need for further cardiac evaluation discussed.  Bradycardia  EKG shows sinus bradycardia.  Patient has been instructed to decrease the dosage of beta-blockers.    Follow-up in 6 to 8 months or earlier as needed.  Follow-up with primary care physician.       Chief Complaint   Patient presents with    Follow-up       Interval History:   Patient presents for follow-up visit.  Patient denies any history of chest pain shortness  of breath.  Patient denies any history of leg edema or orthopnea PND.  No history of presyncope syncope.  Patient states compliance with the present list of medications.  Patient does have memory issues.  Patient also has history of diabetes mellitus and history of CVA followed by primary care physician.    Problem List[1]  Past Medical History:   Diagnosis Date    Anxiety     Bradycardia     Cerebral infarction (HCC)     Diabetes mellitus (HCC)     Diabetic neuropathy (HCC)     Dilated aortic root (HCC)     Essential hypertension     Exercise-induced shortness of breath     Hypertension      Social History     Socioeconomic History    Marital status:      Spouse name: Not on file    Number of children: Not on file    Years of education: Not on file    Highest education level: Not on file   Occupational History    Occupation: Retired   Tobacco Use    Smoking status: Former     Current packs/day: 0.00     Types: Cigarettes     Quit date:      Years since quittin.4    Smokeless tobacco: Never   Vaping Use    Vaping status: Never Used   Substance and Sexual Activity    Alcohol use: Yes     Alcohol/week: 3.0 - 4.0 standard drinks of alcohol     Types: 1 - 2 Cans of beer, 1 Glasses of wine, 1 Shots of liquor per week     Comment: occasionally    Drug use: Not Currently     Types: Marijuana     Comment: Quit in     Sexual activity: Not on file   Other Topics Concern    Not on file   Social History Narrative    Not on file     Social Drivers of Health     Financial Resource Strain: Not At Risk (2025)    Received from [facility]    Financial Insecurity     In the last 12 months did you skip medications to save money?: No     In the last 12 months was there a time when you needed to see a doctor but could not because of cost?: No   Food Insecurity: No Food Insecurity (2025)    Received from [facility]    Food Insecurity     In the last 12 months did you ever  eat less than you felt you should because there wasn't enough money for food?: No   Transportation Needs: No Transportation Needs (4/28/2025)    Received from [facility]    Transportation Needs     In the last 12 months have you ever had to go without healthcare because you didn't have a way to get there?: No   Physical Activity: Not on file   Stress: Not on file   Social Connections: Socially Integrated (4/28/2025)    Received from [facility]    Social Connection     Do you often feel lonely?: No   Intimate Partner Violence: Not on file   Housing Stability: Not At Risk (4/28/2025)    Received from [facility]    Housing Stability     Are you worried that in the next 2 months you may not have stable housing?: No      Family History   Problem Relation Age of Onset    Ovarian cancer Mother     Cancer Sister     No Known Problems Father     No Known Problems Brother     No Known Problems Maternal Grandmother     No Known Problems Maternal Grandfather     No Known Problems Paternal Grandmother     No Known Problems Paternal Grandfather     No Known Problems Brother     No Known Problems Sister     No Known Problems Sister     No Known Problems Son     No Known Problems Son     No Known Problems Daughter     No Known Problems Daughter     No Known Problems Daughter      Past Surgical History:   Procedure Laterality Date    APPENDECTOMY       Current Medications[2]  No Known Allergies    Labs:  No visits with results within 2 Month(s) from this visit.   Latest known visit with results is:   Admission on 12/24/2024, Discharged on 12/24/2024   Component Date Value    POC Glucose 12/24/2024 155 (H)     WBC 12/24/2024 5.46     RBC 12/24/2024 5.61     Hemoglobin 12/24/2024 15.3     Hematocrit 12/24/2024 47.8     MCV 12/24/2024 85     MCH 12/24/2024 27.3     MCHC 12/24/2024 32.0     RDW 12/24/2024 13.5     MPV 12/24/2024 10.8     Platelets 12/24/2024 133 (L)     nRBC  12/24/2024 0     Segmented % 12/24/2024 57     Immature Grans % 12/24/2024 0     Lymphocytes % 12/24/2024 36     Monocytes % 12/24/2024 7     Eosinophils Relative 12/24/2024 0     Basophils Relative 12/24/2024 0     Absolute Neutrophils 12/24/2024 3.02     Absolute Immature Grans 12/24/2024 0.01     Absolute Lymphocytes 12/24/2024 1.99     Absolute Monocytes 12/24/2024 0.40     Eosinophils Absolute 12/24/2024 0.02     Basophils Absolute 12/24/2024 0.02     Sodium 12/24/2024 139     Potassium 12/24/2024 4.2     Chloride 12/24/2024 107     CO2 12/24/2024 26     ANION GAP 12/24/2024 6     BUN 12/24/2024 18     Creatinine 12/24/2024 1.33 (H)     Glucose 12/24/2024 161 (H)     Calcium 12/24/2024 9.8     AST 12/24/2024 13     ALT 12/24/2024 14     Alkaline Phosphatase 12/24/2024 62     Total Protein 12/24/2024 7.1     Albumin 12/24/2024 4.2     Total Bilirubin 12/24/2024 0.58     eGFR 12/24/2024 51     Lipase 12/24/2024 12     Ventricular Rate 12/24/2024 47     Atrial Rate 12/24/2024 47     CT Interval 12/24/2024 226     QRSD Interval 12/24/2024 90     QT Interval 12/24/2024 460     QTC Interval 12/24/2024 407     P Axis 12/24/2024 53     QRS Axis 12/24/2024 4     T Wave Amma 12/24/2024 -5     Color, UA 12/24/2024 Light Yellow     Clarity, UA 12/24/2024 Clear     Specific Gravity, UA 12/24/2024 1.032 (H)     pH, UA 12/24/2024 6.5     Leukocytes, UA 12/24/2024 Elevated glucose may cause decreased leukocyte values. See urine microscopic for UWBC result (A)     Nitrite, UA 12/24/2024 Negative     Protein, UA 12/24/2024 Negative     Glucose, UA 12/24/2024 >=1000 (1%) (A)     Ketones, UA 12/24/2024 Negative     Urobilinogen, UA 12/24/2024 <2.0     Bilirubin, UA 12/24/2024 Negative     Occult Blood, UA 12/24/2024 Negative     RBC, UA 12/24/2024 1-2     WBC, UA 12/24/2024 None Seen     Epithelial Cells 12/24/2024 Occasional     Bacteria, UA 12/24/2024 None Seen      Imaging: No results found.    Review of Systems:  Review of  "Systems  REVIEW OF SYSTEMS:  Constitutional:  Denies fever or chills   Eyes:  Denies change in visual acuity   HENT:  Denies nasal congestion or sore throat   Respiratory:  Denies cough or shortness of breath   Cardiovascular:  Denies chest pain or edema   GI:  Denies abdominal pain, nausea, vomiting, bloody stools or diarrhea   :  Denies dysuria, frequency, difficulty in micturition and nocturia  Musculoskeletal:  Denies back pain or joint pain   Neurologic: Memory issues  Endocrine:  Denies polyuria or polydipsia   Lymphatic:  Denies swollen glands   Psychiatric:  Denies depression or anxiety    Physical Exam:    /74 (BP Location: Left arm, Patient Position: Sitting, Cuff Size: Standard)   Pulse (!) 51   Resp 16   Ht 5' 11.5\" (1.816 m)   Wt 93.4 kg (206 lb)   SpO2 98%   BMI 28.33 kg/m²     Physical Exam  PHYSICAL EXAM:  General:  Patient is not in acute distress   Head: Normocephalic, Atraumatic.  HEENT:  Both pupils normal-size atraumatic, normocephalic, nonicteric  Neck:  JVP not raised. Trachea central. No carotid bruit  Respiratory:  normal breath sounds no crackles. no rhonchi  Cardiovascular:  Regular rate and rhythm no S3 no murmurs  GI:  Abdomen soft nontender. No organomegaly.   Lymphatic:  No cervical or inguinal lymphadenopathy  Neurologic:  Patient is awake alert, oriented . 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)    CVA (cerebral vascular accident) (HCC)    Meningioma (HCC)    Memory difficulty    Stage 3a chronic kidney disease (HCC)   [2]   Current Outpatient Medications:     amLODIPine (NORVASC) 10 mg tablet, every " morning, Disp: , Rfl:     aspirin (ECOTRIN LOW STRENGTH) 81 mg EC tablet, Take 1 tablet by mouth in the morning., Disp: , Rfl:     D3-50 1.25 MG (78550 UT) capsule, Take 50,000 Units by mouth once a week, Disp: , Rfl:     gabapentin (NEURONTIN) 300 mg capsule, Take 1 capsule (300 mg total) by mouth 2 (two) times a day, Disp: 180 capsule, Rfl: 3    glipiZIDE (GLUCOTROL XL) 2.5 mg 24 hr tablet, Take 1 tablet by mouth in the morning., Disp: , Rfl:     Jardiance 10 MG TABS, Take 10 mg by mouth in the morning., Disp: , Rfl:     losartan (COZAAR) 100 MG tablet, Take 1 tablet (100 mg total) by mouth daily, Disp: 90 tablet, Rfl: 3    Lumigan 0.01 % ophthalmic drops, , Disp: , Rfl:     meloxicam (MOBIC) 15 mg tablet, Take 15 mg by mouth if needed, Disp: , Rfl:     methocarbamol (ROBAXIN) 500 mg tablet, as needed, Disp: , Rfl:     metoprolol succinate (TOPROL-XL) 50 mg 24 hr tablet, Take 1 tablet (50 mg total) by mouth 2 (two) times a day, Disp: 180 tablet, Rfl: 3    ondansetron (ZOFRAN-ODT) 4 mg disintegrating tablet, Take 1 tablet (4 mg total) by mouth every 6 (six) hours as needed for nausea or vomiting, Disp: 12 tablet, Rfl: 0    ONETOUCH DELICA LANCETS 33G MISC, , Disp: , Rfl: 0    ONETOUCH VERIO test strip, , Disp: , Rfl: 0    pravastatin (PRAVACHOL) 10 mg tablet, Take 10 mg by mouth in the morning., Disp: , Rfl:     sildenafil (VIAGRA) 50 MG tablet, Take 50 mg by mouth daily as needed, Disp: , Rfl:     sitaGLIPtin (JANUVIA) 100 mg tablet, Take 1 tablet by mouth in the morning., Disp: , Rfl:     tamsulosin (FLOMAX) 0.4 mg, Take 1 capsule (0.4 mg total) by mouth daily with dinner for 7 days, Disp: 7 capsule, Rfl: 0

## 2025-05-14 NOTE — ASSESSMENT & PLAN NOTE
Lab Results   Component Value Date    EGFR 51 12/24/2024    EGFR 64 09/17/2024    EGFR 64 06/14/2024    CREATININE 1.33 (H) 12/24/2024    CREATININE 1.17 09/17/2024    CREATININE 1.10 06/14/2024   Follow-up with primary care physician

## 2025-05-14 NOTE — ASSESSMENT & PLAN NOTE
Lab Results   Component Value Date    HGBA1C 7.8 (H) 04/02/2025   Follow-up with primary care physician.

## 2025-05-14 NOTE — ASSESSMENT & PLAN NOTE
Portance of salt restriction and compliance with present medications reviewed.  Continue diet and risk factor modification.

## 2025-06-02 ENCOUNTER — HOSPITAL ENCOUNTER (OUTPATIENT)
Dept: NON INVASIVE DIAGNOSTICS | Facility: CLINIC | Age: 80
Discharge: HOME | End: 2025-06-02
Attending: INTERNAL MEDICINE
Payer: COMMERCIAL

## 2025-06-02 VITALS
SYSTOLIC BLOOD PRESSURE: 110 MMHG | BODY MASS INDEX: 28.84 KG/M2 | HEART RATE: 50 BPM | DIASTOLIC BLOOD PRESSURE: 74 MMHG | WEIGHT: 206 LBS | HEIGHT: 71 IN

## 2025-06-02 DIAGNOSIS — I10 HYPERTENSION, ESSENTIAL: ICD-10-CM

## 2025-06-02 DIAGNOSIS — I71.21 ANEURYSM OF ASCENDING AORTA WITHOUT RUPTURE (HCC): ICD-10-CM

## 2025-06-02 LAB
AORTIC ROOT: 4.4 CM
ASCENDING AORTA: 4.5 CM
AV REGURGITATION PRESSURE HALF TIME: 976 MS
BSA FOR ECHO PROCEDURE: 2.14 M2
E WAVE DECELERATION TIME: 184 MS
E/A RATIO: 0.76
FRACTIONAL SHORTENING: 37 (ref 28–44)
INTERVENTRICULAR SEPTUM IN DIASTOLE (PARASTERNAL SHORT AXIS VIEW): 1.4 CM
INTERVENTRICULAR SEPTUM: 1.4 CM (ref 0.6–1.1)
LAAS-AP2: 22.5 CM2
LAAS-AP4: 19.8 CM2
LEFT ATRIUM SIZE: 4.2 CM
LEFT ATRIUM VOLUME (MOD BIPLANE): 65 ML
LEFT ATRIUM VOLUME INDEX (MOD BIPLANE): 30.2 ML/M2
LEFT INTERNAL DIMENSION IN SYSTOLE: 2.7 CM (ref 2.1–4)
LEFT VENTRICULAR INTERNAL DIMENSION IN DIASTOLE: 4.3 CM (ref 3.5–6)
LEFT VENTRICULAR POSTERIOR WALL IN END DIASTOLE: 1.1 CM
LEFT VENTRICULAR STROKE VOLUME: 58 ML
LV EF US.2D.A4C+ESTIMATED: 61 %
LVSV (TEICH): 58 ML
MV E'TISSUE VEL-SEP: 4 CM/S
MV PEAK A VEL: 0.91 M/S
MV PEAK E VEL: 69 CM/S
MV STENOSIS PRESSURE HALF TIME: 53 MS
MV VALVE AREA P 1/2 METHOD: 4.15
RIGHT ATRIUM AREA SYSTOLE A4C: 13 CM2
RIGHT VENTRICLE ID DIMENSION: 3.6 CM
SINOTUBULAR JUNCTION: 3.8 CM
SL CV AV DECELERATION TIME RETROGRADE: 3364 MS
SL CV AV PEAK GRADIENT RETROGRADE: 63 MMHG
SL CV LEFT ATRIUM LENGTH A2C: 5.2 CM
SL CV LV EF: 60
SL CV PED ECHO LEFT VENTRICLE DIASTOLIC VOLUME (MOD BIPLANE) 2D: 84 ML
SL CV PED ECHO LEFT VENTRICLE SYSTOLIC VOLUME (MOD BIPLANE) 2D: 26 ML
SL CV SINUS OF VALSALVA 2D: 4.6 CM
STJ: 3.8 CM
TR MAX PG: 34 MMHG
TR PEAK VELOCITY: 2.9 M/S
TRICUSPID ANNULAR PLANE SYSTOLIC EXCURSION: 2.3 CM
TRICUSPID VALVE PEAK REGURGITATION VELOCITY: 2.91 M/S

## 2025-06-02 PROCEDURE — 93306 TTE W/DOPPLER COMPLETE: CPT | Performed by: INTERNAL MEDICINE

## 2025-06-02 PROCEDURE — 93306 TTE W/DOPPLER COMPLETE: CPT

## 2025-06-10 ENCOUNTER — RESULTS FOLLOW-UP (OUTPATIENT)
Dept: CARDIOLOGY CLINIC | Facility: CLINIC | Age: 80
End: 2025-06-10

## 2025-06-11 ENCOUNTER — TELEPHONE (OUTPATIENT)
Age: 80
End: 2025-06-11

## 2025-06-11 NOTE — TELEPHONE ENCOUNTER
Patient Zay [phone] established patient with Dr. Holder, contacting [facility] returning telephone call from [facility] to discuss echocardiogram results.

## 2025-06-11 NOTE — TELEPHONE ENCOUNTER
Shantal Holder MD to Cardiology [facility]      6/10/25  7:34 PM  Result Note  Please call the patient.  Echocardiogram shows normal ejection fraction with mild aortic regurgitation and mild mitral regurgitation.     Dilated aortic root and ascending aorta.  Will wait for the results of CT of the chest which will give a better idea about the size.  Echo complete w/ contrast if indicated      Spoke with pt relayed  response, pt verbally understood.

## 2025-06-11 NOTE — TELEPHONE ENCOUNTER
Called pt and relayed Dr. NICHOLS's feedback. Also instructed him that he needs to schedule a chest CT in order for Dr. NICHOLS to give him further advice and recommendations. Pt verbally understood.

## 2025-06-11 NOTE — TELEPHONE ENCOUNTER
Caller: Zay Chance    Doctor: Dr. Holder    Reason for call: pt is calling for results of his echo. Please call pt at    Call back#: [phone]

## 2025-06-20 ENCOUNTER — HOSPITAL ENCOUNTER (OUTPATIENT)
Dept: CT IMAGING | Facility: HOSPITAL | Age: 80
End: 2025-06-20
Attending: INTERNAL MEDICINE
Payer: COMMERCIAL

## 2025-06-20 ENCOUNTER — APPOINTMENT (OUTPATIENT)
Dept: RADIOLOGY | Facility: HOSPITAL | Age: 80
End: 2025-06-20
Payer: COMMERCIAL

## 2025-06-20 ENCOUNTER — OFFICE VISIT (OUTPATIENT)
Dept: NEUROLOGY | Facility: CLINIC | Age: 80
End: 2025-06-20
Payer: COMMERCIAL

## 2025-06-20 VITALS — SYSTOLIC BLOOD PRESSURE: 122 MMHG | DIASTOLIC BLOOD PRESSURE: 60 MMHG | HEART RATE: 46 BPM | OXYGEN SATURATION: 96 %

## 2025-06-20 DIAGNOSIS — R41.3 MEMORY DIFFICULTY: Primary | ICD-10-CM

## 2025-06-20 DIAGNOSIS — I71.21 ANEURYSM OF ASCENDING AORTA WITHOUT RUPTURE (HCC): ICD-10-CM

## 2025-06-20 DIAGNOSIS — R42 DIZZINESS AND GIDDINESS: ICD-10-CM

## 2025-06-20 DIAGNOSIS — D32.9 MENINGIOMA (HCC): ICD-10-CM

## 2025-06-20 DIAGNOSIS — Z86.73 HISTORY OF CVA (CEREBROVASCULAR ACCIDENT): ICD-10-CM

## 2025-06-20 PROCEDURE — 99214 OFFICE O/P EST MOD 30 MIN: CPT | Performed by: PSYCHIATRY & NEUROLOGY

## 2025-06-20 PROCEDURE — 71250 CT THORAX DX C-: CPT

## 2025-06-20 NOTE — PROGRESS NOTES
Neurology Ambulatory Visit  Name: Zay Chance       : 1945       MRN: [id number]   Encounter Provider: Brando Turner MD   Encounter Date: 2025  Encounter department: [facility]      Zay Chance is a 79 y.o. male.       Assessment & Plan  Memory difficulty       MoCA  he does have mild cognitive impairment his last [place] was , patient tells me that his memory has been stable he has things on his mind and hence he is not concentrating but otherwise he is able to do his day-to-day activities without any issues and has not noticed any change he is not keen to go for cognitive therapy or go on any medications I advised him to continue with mentally stimulating exercises at home and to eat a healthy nutritious diet and to take a multivitamin every day.    He was advised to take fall and safety precautions and to be careful with his driving and preferably avoid driving if he is having any worsening memory issues, he did follow-up with an ID specialist regarding his positive RPR and FTA antibody in the past, he tells me that his neuropathy symptoms are under control advised him to decrease Neurontin from 300 mg twice a day to once a day, and see how he does, to go to the hospital if he has any worsening symptoms and call me otherwise to see me back in 6 months or sooner if needed and follow-up with the other physicians.  History of CVA (cerebrovascular accident)       Patient without any residual deficits he is on aspirin and statin to continue with same, stroke education given to the patient, was advised to keep his blood pressure, cholesterol, sugar under control, to go to the hospital if has any recurrence of strokelike symptoms and call us otherwise to see me back in 6 months or sooner if needed and follow-up with the other physicians.  Dizziness and giddiness       Currently resolved.  Meningioma (HCC)         Management as per neurosurgery,  stable.    Subjective:  Chief Complaint   Patient presents with    Memory Loss    Peripheral Neuropathy       HISTORY OF PRESENT ILLNESS    Patient is here in follow-up for his history of CVA without any residual deficits and his memory difficulty, he feels he is about the same from the last visit he feels his memory is stable, he is able to do his ADLs, he is able to drive without any issues he is able to manage his finances he lives with his wife, his mood is good sleep is good and he feels his neuropathy symptoms are under control there is no recurrence of strokelike symptoms he is on an aspirin and statin, he is in follow-up with a neurosurgeon regarding his history of meningioma and was having an MRI of the brain every 5 years, denies any dizziness, sleep is good no other complaints.        Prior Work-up:   MRI: Brain with and without contrast from 6/17/2024 was reported as stable right frontal convexity meningioma       Past Medical History:    Past Medical History[1]  Past Surgical History[2]    Family History:  Family History[3]    Social History:  Social History[4]   Living situation:    Work:      Allergies:  Allergies[5]    Medications:  Current Medications[6]    Objective:  /60 (BP Location: Left arm, Patient Position: Sitting, Cuff Size: Large)   Pulse (!) 46   SpO2 96%      Labs  I have reviewed pertinent labs:  CBC:   Lab Results   Component Value Date    WBC 5.46 12/24/2024    RBC 5.61 12/24/2024    HGB 15.3 12/24/2024    HCT 47.8 12/24/2024    MCV 85 12/24/2024     (L) 12/24/2024    MCH 27.3 12/24/2024    MCHC 32.0 12/24/2024    RDW 13.5 12/24/2024    MPV 10.8 12/24/2024    NEUTROABS 3.02 12/24/2024     CMP:   Lab Results   Component Value Date    SODIUM 139 12/24/2024    K 4.2 12/24/2024     12/24/2024    CO2 26 12/24/2024    AGAP 6 12/24/2024    BUN 18 12/24/2024    CREATININE 1.33 (H) 12/24/2024    GLUC 161 (H) 12/24/2024    GLUF 112 (H) 06/18/2021    CALCIUM 9.8 12/24/2024  "   AST 13 12/24/2024    ALT 14 12/24/2024    ALKPHOS 62 12/24/2024    TP 7.1 12/24/2024    ALB 4.2 12/24/2024    TBILI 0.58 12/24/2024    EGFR 51 12/24/2024     Thyroid Studies:   Lab Results   Component Value Date    KDE2BWGOIWMV 1.211 10/05/2023    FREET4 1.01 12/09/2020     Vitamin D Level No results found for: \"[id number]\", \"[id number]\"  Vitamin B12   Lab Results   Component Value Date    BEIZYGJX27 737 04/02/2025     Folate   Lab Results   Component Value Date    FOLATE >22.3 10/05/2023              General Exam  GENERAL APPEARANCE:  No distress, alert, interactive and cooperative.  CARDIOVASCULAR: Warm and well perfused  LUNGS: normal work of breathing on room air  EXTREMITIES: no peripheral edema     Neurologic Exam  MENTAL STATE:  Orientation was normal to time, place and person without aphasia or apraxia.  MoCA is 17/30.    CRANIAL NERVES:  CN 2       visual fields full to confrontation.  Visual acuity is 20/20 with hand-held.chart.  CN 3, 4, 6  Pupils round, 4 mm in diameter, equally reactive to light. Lids symmetric; no ptosis. EOMs normal alignment, full range. No nystagmus.  CN 5  Facial sensation intact bilaterally.  CN 7  Normal and symmetric facial strength.   CN 8  Hearing intact to finger rub bilaterally.  CN 9  Palate elevates symmetrically.  CN 11  Normal strength of shoulder shrug and neck turning.  CN 12  Tongue midline, with normal bulk and strength.     MOTOR:  Motor exam was normal. Muscle bulk and tone were normal in both upper and lower extremities. Muscle strength was 5/5 in distal and proximal muscles in both upper and lower extremities. No fasciculations, tremor or other abnormal movements were present.     REFLEXES:  RIGHT UE   LEFT UE  BR:2              BR:2    Biceps:2      Biceps:2    Triceps:2     Triceps:2       RIGHT LLE   LEFT LLE    Knee:2           Knee:2    Ankle:2         Ankle:2       SENSORY:  Decreased light touch pinprick temperature sensation in a stocking " distribution cortical function is intact.    COORDINATION:   Coordination exam was normal. In both upper extremities, finger-nose-finger was intact without dysmetria or overshoot.      GAIT:  Gait was normal. Station was stable with a normal base. Gait was stable with a normal arm swing and speed. Tandem gait was intact. No Romberg sign was present.      ROS:  Review of Systems   Constitutional:  Negative for appetite change, fatigue and fever.   HENT: Negative.  Negative for hearing loss, tinnitus, trouble swallowing and voice change.    Eyes: Negative.  Negative for photophobia, pain and visual disturbance.   Respiratory: Negative.  Negative for shortness of breath.    Cardiovascular: Negative.  Negative for palpitations.   Gastrointestinal: Negative.  Negative for nausea and vomiting.   Endocrine: Negative.  Negative for cold intolerance.   Genitourinary: Negative.  Negative for dysuria, frequency and urgency.   Musculoskeletal:  Negative for back pain, gait problem, myalgias, neck pain and neck stiffness.   Skin: Negative.  Negative for rash.   Allergic/Immunologic: Negative.    Neurological:  Positive for numbness. Negative for dizziness, tremors, seizures, syncope, facial asymmetry, speech difficulty, weakness, light-headedness and headaches.   Hematological: Negative.  Does not bruise/bleed easily.   Psychiatric/Behavioral:  Positive for confusion. Negative for hallucinations and sleep disturbance.        I have reviewed the past medical history, surgical history, social and family history, current medications, allergies vitals, review of systems, and updated this information as appropriate today.    Administrative Statements   The total amount of time spent with the patient and on chart review and documentation was minutes. Issues addressed during this clinic visit included overall management, medication counseling or monitoring, and counseling and coordination of care.         Brando Turner MD                 [1]   Past Medical History:  Diagnosis Date    Anxiety     Bradycardia     Cerebral infarction (HCC)     Diabetes mellitus (HCC)     Diabetic neuropathy (HCC)     Dilated aortic root (HCC)     Essential hypertension     Exercise-induced shortness of breath     Hypertension    [2]   Past Surgical History:  Procedure Laterality Date    APPENDECTOMY     [3]   Family History  Problem Relation Name Age of Onset    Ovarian cancer Mother      Cancer Sister      No Known Problems Father      No Known Problems Brother      No Known Problems Maternal Grandmother      No Known Problems Maternal Grandfather      No Known Problems Paternal Grandmother      No Known Problems Paternal Grandfather      No Known Problems Brother      No Known Problems Sister      No Known Problems Sister      No Known Problems Son      No Known Problems Son      No Known Problems Daughter      No Known Problems Daughter      No Known Problems Daughter     [4]   Social History  Tobacco Use    Smoking status: Former     Current packs/day: 0.00     Types: Cigarettes     Quit date:      Years since quittin.5    Smokeless tobacco: Never   Vaping Use    Vaping status: Never Used   Substance Use Topics    Alcohol use: Yes     Alcohol/week: 3.0 - 4.0 standard drinks of alcohol     Types: 1 - 2 Cans of beer, 1 Glasses of wine, 1 Shots of liquor per week     Comment: occasionally    Drug use: Not Currently     Types: Marijuana     Comment: Quit in    [5] No Known Allergies  [6]   Current Outpatient Medications:     amLODIPine (NORVASC) 10 mg tablet, every morning, Disp: , Rfl:     aspirin (ECOTRIN LOW STRENGTH) 81 mg EC tablet, Take 1 tablet by mouth in the morning., Disp: , Rfl:     D3-50 1.25 MG (87418 UT) capsule, Take 50,000 Units by mouth once a week, Disp: , Rfl:     gabapentin (NEURONTIN) 300 mg capsule, Take 1 capsule (300 mg total) by mouth 2 (two) times a day, Disp: 180 capsule, Rfl: 3    glipiZIDE (GLUCOTROL XL) 2.5 mg 24 hr  tablet, Take 1 tablet by mouth in the morning., Disp: , Rfl:     Jardiance 10 MG TABS, Take 10 mg by mouth in the morning., Disp: , Rfl:     losartan (COZAAR) 100 MG tablet, Take 1 tablet (100 mg total) by mouth daily, Disp: 90 tablet, Rfl: 3    Lumigan 0.01 % ophthalmic drops, , Disp: , Rfl:     meloxicam (MOBIC) 15 mg tablet, Take 15 mg by mouth if needed, Disp: , Rfl:     metoprolol succinate (TOPROL-XL) 50 mg 24 hr tablet, 1 tab daily, Disp: , Rfl:     ONETOUCH DELICA LANCETS 33G MISC, , Disp: , Rfl: 0    ONETOUCH VERIO test strip, , Disp: , Rfl: 0    pravastatin (PRAVACHOL) 10 mg tablet, Take 10 mg by mouth in the morning., Disp: , Rfl:     sildenafil (VIAGRA) 50 MG tablet, Take 50 mg by mouth daily as needed, Disp: , Rfl:     sitaGLIPtin (JANUVIA) 100 mg tablet, Take 1 tablet by mouth in the morning., Disp: , Rfl:     tamsulosin (FLOMAX) 0.4 mg, Take 1 capsule (0.4 mg total) by mouth daily with dinner for 7 days, Disp: 7 capsule, Rfl: 0    methocarbamol (ROBAXIN) 500 mg tablet, as needed, Disp: , Rfl:     ondansetron (ZOFRAN-ODT) 4 mg disintegrating tablet, Take 1 tablet (4 mg total) by mouth every 6 (six) hours as needed for nausea or vomiting, Disp: 12 tablet, Rfl: 0

## 2025-06-20 NOTE — ASSESSMENT & PLAN NOTE
Patient without any residual deficits he is on aspirin and statin to continue with same, stroke education given to the patient, was advised to keep his blood pressure, cholesterol, sugar under control, to go to the hospital if has any recurrence of strokelike symptoms and call us otherwise to see me back in 6 months or sooner if needed and follow-up with the other physicians.

## 2025-06-20 NOTE — ASSESSMENT & PLAN NOTE
[facility]CA 17/30 he does have mild cognitive impairment his last [place] was 21/30, patient tells me that his memory has been stable he has things on his mind and hence he is not concentrating but otherwise he is able to do his day-to-day activities without any issues and has not noticed any change he is not keen to go for cognitive therapy or go on any medications I advised him to continue with mentally stimulating exercises at home and to eat a healthy nutritious diet and to take a multivitamin every day.    He was advised to take fall and safety precautions and to be careful with his driving and preferably avoid driving if he is having any worsening memory issues, he did follow-up with an ID specialist regarding his positive RPR and FTA antibody in the past, he tells me that his neuropathy symptoms are under control advised him to decrease Neurontin from 300 mg twice a day to once a day, and see how he does, to go to the hospital if he has any worsening symptoms and call me otherwise to see me back in 6 months or sooner if needed and follow-up with the other physicians.

## 2025-06-26 ENCOUNTER — HOSPITAL ENCOUNTER (OUTPATIENT)
Dept: MRI IMAGING | Facility: HOSPITAL | Age: 80
End: 2025-06-26
Payer: COMMERCIAL

## 2025-06-26 DIAGNOSIS — D32.9 MENINGIOMA (HCC): ICD-10-CM

## 2025-06-26 PROCEDURE — A9585 GADOBUTROL INJECTION: HCPCS | Performed by: NURSE PRACTITIONER

## 2025-06-26 PROCEDURE — 70553 MRI BRAIN STEM W/O & W/DYE: CPT

## 2025-06-26 RX ORDER — GADOBUTROL 604.72 MG/ML
9 INJECTION INTRAVENOUS
Status: COMPLETED | OUTPATIENT
Start: 2025-06-26 | End: 2025-06-26

## 2025-06-26 RX ADMIN — GADOBUTROL 9 ML: 604.72 INJECTION INTRAVENOUS at 13:38

## 2025-06-27 NOTE — ASSESSMENT & PLAN NOTE
Annual surveillance of right lateral frontal convexity meningioma  Discovered incidentally on work up of dizziness in 2020 by Dr. Turner.  Feeling well, has occasional positional lightheadedness.  Exam: non-focal.    Imaging:  MRI brain 6/26/25: Stable right frontal meningioma. No acute process seen     Plan:  Reviewed imaging with patient. Spoke with daughter via phone to update her as well.  Stable appearance to meningioma compared to 2020.  Discussed natural history of meningiomas. They are generally asymptomatic, benign and slow growing.    Recommend follow up as directed by the NCCN guidelines.   If there is growth or patient were to have symptoms, there are options for management including surgical resection or SRS.   Given stability over almost 5 years, discussed option of follow-up in 1 or 2 years.  Patient would like 2 year follow-up.  This is reasonable.  Review red flag s/s.   Follow-up in 2 years with MRI brain w/wo to see AP  Call sooner with any questions or concerns.

## 2025-06-30 ENCOUNTER — OFFICE VISIT (OUTPATIENT)
Dept: NEUROSURGERY | Facility: CLINIC | Age: 80
End: 2025-06-30
Payer: COMMERCIAL

## 2025-06-30 VITALS
HEIGHT: 72 IN | DIASTOLIC BLOOD PRESSURE: 64 MMHG | OXYGEN SATURATION: 99 % | SYSTOLIC BLOOD PRESSURE: 118 MMHG | WEIGHT: 207.4 LBS | BODY MASS INDEX: 28.09 KG/M2 | RESPIRATION RATE: 16 BRPM | TEMPERATURE: 98.2 F | HEART RATE: 59 BPM

## 2025-06-30 DIAGNOSIS — I71.21 ANEURYSM OF ASCENDING AORTA WITHOUT RUPTURE (HCC): Primary | ICD-10-CM

## 2025-06-30 DIAGNOSIS — D32.9 MENINGIOMA (HCC): Primary | ICD-10-CM

## 2025-06-30 PROCEDURE — 99214 OFFICE O/P EST MOD 30 MIN: CPT | Performed by: PHYSICIAN ASSISTANT

## 2025-06-30 NOTE — PROGRESS NOTES
Name: Zay Chance      : 1945      MRN: [id number]  Encounter Provider: Ceci Azul PA-C  Encounter Date: 2025   Encounter department: [facility]  :  Assessment & Plan  Meningioma (HCC)  Annual surveillance of right lateral frontal convexity meningioma  Discovered incidentally on work up of dizziness in  by Dr. Turner.  Feeling well, has occasional positional lightheadedness.  Exam: non-focal.    Imaging:  MRI brain 25: Stable right frontal meningioma. No acute process seen     Plan:  Reviewed imaging with patient. Spoke with daughter via phone to update her as well.  Stable appearance to meningioma compared to 2020.  Discussed natural history of meningiomas. They are generally asymptomatic, benign and slow growing.    Recommend follow up as directed by the NCCN guidelines.   If there is growth or patient were to have symptoms, there are options for management including surgical resection or SRS.   Given stability over almost 5 years, discussed option of follow-up in 1 or 2 years.  Patient would like 2 year follow-up.  This is reasonable.  Review red flag s/s.   Follow-up in 2 years with MRI brain w/wo to see AP  Call sooner with any questions or concerns.               History of Present Illness     Zay Chance is a 79 y.o. male who presents for follow up of a right lateral frontal convexity meningioma. This was discovered incidentally in  when he underwent work-up for ongoing dizziness with Dr. Turner.  Denies headaches, seizures.  No new vision changes, new numbness or weakness.  He has occasional positional lightheadedness.    HPI     Review of Systems   HENT:  Negative for tinnitus.    Eyes:  Negative for visual disturbance.   Respiratory:  Negative for chest tightness and shortness of breath.    Gastrointestinal: Negative.    Genitourinary: Negative.    Musculoskeletal:  Negative for gait problem.   Neurological:  Positive for  "dizziness and light-headedness (especially if bending over). Negative for seizures, speech difficulty, weakness, numbness and headaches.   Hematological:  Does not bruise/bleed easily.        ASA 81   Psychiatric/Behavioral:  Negative for confusion and decreased concentration.      I have personally reviewed the MA's review of systems and made changes as necessary.    Past Medical History   Past Medical History[1]  Past Surgical History[2]  Family History[3]  he reports that he quit smoking about 55 years ago. His smoking use included cigarettes. He has never used smokeless tobacco. He reports current alcohol use of about 3.0 - 4.0 standard drinks of alcohol per week. He reports that he does not currently use drugs after having used the following drugs: Marijuana.  Current Outpatient Medications   Medication Instructions    amLODIPine (NORVASC) 10 mg tablet Every morning    aspirin (ECOTRIN LOW STRENGTH) 81 mg EC tablet 1 tablet, Daily    D3-50 50,000 Units, Weekly    gabapentin (NEURONTIN) 300 mg, Oral, 2 times daily    glipiZIDE (GLUCOTROL XL) 2.5 mg 24 hr tablet 1 tablet, Daily    Jardiance 10 mg, Daily    losartan (COZAAR) 100 mg, Oral, Daily    Lumigan 0.01 % ophthalmic drops     meloxicam (MOBIC) 15 mg, As needed    methocarbamol (ROBAXIN) 500 mg tablet As needed    metoprolol succinate (TOPROL-XL) 50 mg 24 hr tablet 1 tab daily    ondansetron (ZOFRAN-ODT) 4 mg, Oral, Every 6 hours PRN    ONETOUCH DELICA LANCETS 33G MISC     ONETOUCH VERIO test strip     pravastatin (PRAVACHOL) 10 mg, Daily    sildenafil (VIAGRA) 50 mg, Daily PRN    sitaGLIPtin (JANUVIA) 100 mg tablet 1 tablet, Daily    tamsulosin (FLOMAX) 0.4 mg, Oral, Daily with dinner   Allergies[4]   Objective   /64 (BP Location: Right arm, Patient Position: Sitting, Cuff Size: Large)   Pulse 59   Temp 98.2 °F (36.8 °C) (Temporal)   Resp 16   Ht 5' 11.5\" (1.816 m)   Wt 94.1 kg (207 lb 6.4 oz)   SpO2 99%   BMI 28.52 kg/m²     Physical " Exam  Vitals reviewed.   Constitutional:       General: He is awake.      Appearance: Normal appearance.   HENT:      Head: Normocephalic and atraumatic.     Eyes:      Extraocular Movements: Extraocular movements intact.      Conjunctiva/sclera: Conjunctivae normal.      Pupils: Pupils are equal, round, and reactive to light.       Cardiovascular:      Rate and Rhythm: Normal rate.   Pulmonary:      Effort: Pulmonary effort is normal.     Skin:     General: Skin is warm and dry.     Neurological:      Mental Status: He is alert.      Deep Tendon Reflexes:      Reflex Scores:       Bicep reflexes are 2+ on the right side and 2+ on the left side.       Brachioradialis reflexes are 2+ on the right side and 2+ on the left side.       Patellar reflexes are 2+ on the right side and 2+ on the left side.    Psychiatric:         Attention and Perception: Attention and perception normal.         Mood and Affect: Mood and affect normal.         Speech: Speech normal.         Behavior: Behavior normal. Behavior is cooperative.         Thought Content: Thought content normal.         Cognition and Memory: Cognition and memory normal.         Judgment: Judgment normal.       Neurological Exam  Mental Status  Awake and alert. Memory is normal. Recent and remote memory are intact. Speech is normal. Language is fluent with no aphasia. Attention and concentration are normal. Fund of knowledge is appropriate for level of education.    Cranial Nerves  CN III, IV, VI: Extraocular movements intact bilaterally. Pupils equal round and reactive to light bilaterally.  CN V:  Right: Facial sensation is normal.  Left: Facial sensation is normal on the left.  CN VII: Full and symmetric facial movement.  CN VIII: Hearing is normal.  CN XI:  Right: Trapezius strength is normal.  Left: Trapezius strength is normal.  CN XII: Tongue midline without atrophy or fasciculations.    Motor  Normal muscle bulk throughout. Normal muscle tone. No pronator  drift.                                             Right                     Left  Hip flexion                              5                          5  Plantarflexion                         5                          5  Dorsiflexion                            5                          5                                             Right                     Left   Biceps                                   5                          5   Triceps                                  5                          5   5/5 bilaterally .    Sensory  Light touch is normal in upper and lower extremities.     Reflexes                                            Right                      Left  Brachioradialis                    2+                         2+  Biceps                                 2+                         2+  Patellar                                2+                         2+    Right pathological reflexes: Jeff's absent.  Left pathological reflexes: Jeff's absent.    Coordination  Right: Finger-to-nose normal.Left: Finger-to-nose normal.    Gait  Casual gait is normal including stance, stride, and arm swing.                     [1]   Past Medical History:  Diagnosis Date    Anxiety     Bradycardia     Cerebral infarction (HCC)     Diabetes mellitus (HCC)     Diabetic neuropathy (HCC)     Dilated aortic root (HCC)     Essential hypertension     Exercise-induced shortness of breath     Hypertension    [2]   Past Surgical History:  Procedure Laterality Date    APPENDECTOMY     [3]   Family History  Problem Relation Name Age of Onset    Ovarian cancer Mother      Cancer Sister      No Known Problems Father      No Known Problems Brother      No Known Problems Maternal Grandmother      No Known Problems Maternal Grandfather      No Known Problems Paternal Grandmother      No Known Problems Paternal Grandfather      No Known Problems Brother      No Known Problems Sister      No Known Problems Sister       No Known Problems Son      No Known Problems Son      No Known Problems Daughter      No Known Problems Daughter      No Known Problems Daughter     [4] No Known Allergies

## 2025-07-29 ENCOUNTER — TELEPHONE (OUTPATIENT)
Age: 80
End: 2025-07-29

## 2025-07-31 ENCOUNTER — APPOINTMENT (EMERGENCY)
Dept: RADIOLOGY | Facility: HOSPITAL | Age: 80
End: 2025-07-31
Payer: COMMERCIAL

## 2025-07-31 ENCOUNTER — APPOINTMENT (EMERGENCY)
Dept: CT IMAGING | Facility: HOSPITAL | Age: 80
End: 2025-07-31
Payer: COMMERCIAL

## 2025-07-31 ENCOUNTER — HOSPITAL ENCOUNTER (EMERGENCY)
Facility: HOSPITAL | Age: 80
Discharge: HOME | End: 2025-07-31
Payer: COMMERCIAL

## 2025-07-31 VITALS
SYSTOLIC BLOOD PRESSURE: 155 MMHG | BODY MASS INDEX: 28.84 KG/M2 | DIASTOLIC BLOOD PRESSURE: 75 MMHG | RESPIRATION RATE: 18 BRPM | OXYGEN SATURATION: 95 % | HEART RATE: 50 BPM | HEIGHT: 71 IN | WEIGHT: 206 LBS | TEMPERATURE: 97.6 F

## 2025-07-31 DIAGNOSIS — R10.9 ABDOMINAL PAIN: Primary | ICD-10-CM

## 2025-07-31 LAB
ALBUMIN SERPL BCG-MCNC: 4 G/DL (ref 3.5–5)
ALP SERPL-CCNC: 58 U/L (ref 34–104)
ALT SERPL W P-5'-P-CCNC: 11 U/L (ref 7–52)
ANION GAP SERPL CALCULATED.3IONS-SCNC: 5 MMOL/L (ref 4–13)
AST SERPL W P-5'-P-CCNC: 17 U/L (ref 13–39)
BACTERIA UR QL AUTO: ABNORMAL /HPF
BASOPHILS # BLD AUTO: 0.01 THOUSANDS/ÂΜL (ref 0–0.1)
BASOPHILS NFR BLD AUTO: 0 % (ref 0–1)
BILIRUB SERPL-MCNC: 0.43 MG/DL (ref 0.2–1)
BILIRUB UR QL STRIP: NEGATIVE
BUN SERPL-MCNC: 18 MG/DL (ref 5–25)
CALCIUM SERPL-MCNC: 9.6 MG/DL (ref 8.4–10.2)
CARDIAC TROPONIN I PNL SERPL HS: 7 NG/L (ref ?–50)
CHLORIDE SERPL-SCNC: 110 MMOL/L (ref 96–108)
CLARITY UR: CLEAR
CO2 SERPL-SCNC: 24 MMOL/L (ref 21–32)
COLOR UR: ABNORMAL
CREAT SERPL-MCNC: 0.95 MG/DL (ref 0.6–1.3)
EOSINOPHIL # BLD AUTO: 0.03 THOUSAND/ÂΜL (ref 0–0.61)
EOSINOPHIL NFR BLD AUTO: 1 % (ref 0–6)
ERYTHROCYTE [DISTWIDTH] IN BLOOD BY AUTOMATED COUNT: 13.5 % (ref 11.6–15.1)
GFR SERPL CREATININE-BSD FRML MDRD: 76 ML/MIN/1.73SQ M
GLUCOSE SERPL-MCNC: 139 MG/DL (ref 65–140)
GLUCOSE UR STRIP-MCNC: ABNORMAL MG/DL
HCT VFR BLD AUTO: 44.9 % (ref 36.5–49.3)
HGB BLD-MCNC: 14.8 G/DL (ref 12–17)
HGB UR QL STRIP.AUTO: NEGATIVE
IMM GRANULOCYTES # BLD AUTO: 0.01 THOUSAND/UL (ref 0–0.2)
IMM GRANULOCYTES NFR BLD AUTO: 0 % (ref 0–2)
KETONES UR STRIP-MCNC: NEGATIVE MG/DL
LEUKOCYTE ESTERASE UR QL STRIP: ABNORMAL
LIPASE SERPL-CCNC: 12 U/L (ref 11–82)
LYMPHOCYTES # BLD AUTO: 1.35 THOUSANDS/ÂΜL (ref 0.6–4.47)
LYMPHOCYTES NFR BLD AUTO: 37 % (ref 14–44)
MCH RBC QN AUTO: 28 PG (ref 26.8–34.3)
MCHC RBC AUTO-ENTMCNC: 33 G/DL (ref 31.4–37.4)
MCV RBC AUTO: 85 FL (ref 82–98)
MONOCYTES # BLD AUTO: 0.44 THOUSAND/ÂΜL (ref 0.17–1.22)
MONOCYTES NFR BLD AUTO: 12 % (ref 4–12)
NEUTROPHILS # BLD AUTO: 1.83 THOUSANDS/ÂΜL (ref 1.85–7.62)
NEUTS SEG NFR BLD AUTO: 50 % (ref 43–75)
NITRITE UR QL STRIP: NEGATIVE
NON-SQ EPI CELLS URNS QL MICRO: ABNORMAL /HPF
NRBC BLD AUTO-RTO: 0 /100 WBCS
PH UR STRIP.AUTO: 6.5 [PH]
PLATELET # BLD AUTO: 142 THOUSANDS/UL (ref 149–390)
PMV BLD AUTO: 10.5 FL (ref 8.9–12.7)
POTASSIUM SERPL-SCNC: 4.4 MMOL/L (ref 3.5–5.3)
PROT SERPL-MCNC: 7.1 G/DL (ref 6.4–8.4)
PROT UR STRIP-MCNC: NEGATIVE MG/DL
RBC # BLD AUTO: 5.28 MILLION/UL (ref 3.88–5.62)
RBC #/AREA URNS AUTO: ABNORMAL /HPF
SODIUM SERPL-SCNC: 139 MMOL/L (ref 135–147)
SP GR UR STRIP.AUTO: 1.04 (ref 1–1.03)
UROBILINOGEN UR STRIP-ACNC: <2 MG/DL
WBC # BLD AUTO: 3.67 THOUSAND/UL (ref 4.31–10.16)
WBC #/AREA URNS AUTO: ABNORMAL /HPF

## 2025-07-31 PROCEDURE — 99284 EMERGENCY DEPT VISIT MOD MDM: CPT

## 2025-07-31 PROCEDURE — 93005 ELECTROCARDIOGRAM TRACING: CPT

## 2025-07-31 PROCEDURE — 83690 ASSAY OF LIPASE: CPT

## 2025-07-31 PROCEDURE — 99285 EMERGENCY DEPT VISIT HI MDM: CPT

## 2025-07-31 PROCEDURE — 96361 HYDRATE IV INFUSION ADD-ON: CPT

## 2025-07-31 PROCEDURE — 36415 COLL VENOUS BLD VENIPUNCTURE: CPT

## 2025-07-31 PROCEDURE — 85025 COMPLETE CBC W/AUTO DIFF WBC: CPT

## 2025-07-31 PROCEDURE — 84484 ASSAY OF TROPONIN QUANT: CPT

## 2025-07-31 PROCEDURE — 74177 CT ABD & PELVIS W/CONTRAST: CPT

## 2025-07-31 PROCEDURE — 80053 COMPREHEN METABOLIC PANEL: CPT

## 2025-07-31 PROCEDURE — 96360 HYDRATION IV INFUSION INIT: CPT

## 2025-07-31 PROCEDURE — 81001 URINALYSIS AUTO W/SCOPE: CPT

## 2025-07-31 PROCEDURE — 71046 X-RAY EXAM CHEST 2 VIEWS: CPT

## 2025-07-31 RX ORDER — MAGNESIUM HYDROXIDE/ALUMINUM HYDROXICE/SIMETHICONE 120; 1200; 1200 MG/30ML; MG/30ML; MG/30ML
30 SUSPENSION ORAL ONCE
Status: COMPLETED | OUTPATIENT
Start: 2025-07-31 | End: 2025-07-31

## 2025-07-31 RX ORDER — FAMOTIDINE 20 MG/1
20 TABLET, FILM COATED ORAL ONCE
Status: COMPLETED | OUTPATIENT
Start: 2025-07-31 | End: 2025-07-31

## 2025-07-31 RX ORDER — FAMOTIDINE 20 MG/1
20 TABLET, FILM COATED ORAL 2 TIMES DAILY
Qty: 30 TABLET | Refills: 0 | Status: SHIPPED | OUTPATIENT
Start: 2025-07-31

## 2025-07-31 RX ORDER — SUCRALFATE 1 G/1
1 TABLET ORAL ONCE
Status: COMPLETED | OUTPATIENT
Start: 2025-07-31 | End: 2025-07-31

## 2025-07-31 RX ORDER — DICYCLOMINE HCL 20 MG
20 TABLET ORAL ONCE
Status: COMPLETED | OUTPATIENT
Start: 2025-07-31 | End: 2025-07-31

## 2025-07-31 RX ADMIN — FAMOTIDINE 20 MG: 20 TABLET, FILM COATED ORAL at 16:31

## 2025-07-31 RX ADMIN — SUCRALFATE 1 G: 1 TABLET ORAL at 16:31

## 2025-07-31 RX ADMIN — IOHEXOL 100 ML: 350 INJECTION, SOLUTION INTRAVENOUS at 17:39

## 2025-07-31 RX ADMIN — ALUMINUM HYDROXIDE, MAGNESIUM HYDROXIDE, AND DIMETHICONE 30 ML: 200; 20; 200 SUSPENSION ORAL at 16:31

## 2025-07-31 RX ADMIN — DICYCLOMINE HYDROCHLORIDE 20 MG: 20 TABLET ORAL at 16:31

## 2025-07-31 RX ADMIN — SODIUM CHLORIDE 1000 ML: 0.9 INJECTION, SOLUTION INTRAVENOUS at 17:18

## 2025-08-01 LAB
ATRIAL RATE: 43 BPM
P AXIS: 63 DEGREES
PR INTERVAL: 224 MS
QRS AXIS: 25 DEGREES
QRSD INTERVAL: 86 MS
QT INTERVAL: 480 MS
QTC INTERVAL: 405 MS
T WAVE AXIS: 16 DEGREES
VENTRICULAR RATE: 43 BPM

## 2025-08-01 PROCEDURE — 93010 ELECTROCARDIOGRAM REPORT: CPT | Performed by: STUDENT IN AN ORGANIZED HEALTH CARE EDUCATION/TRAINING PROGRAM
